# Patient Record
Sex: FEMALE | Race: OTHER | HISPANIC OR LATINO | Employment: FULL TIME | URBAN - METROPOLITAN AREA
[De-identification: names, ages, dates, MRNs, and addresses within clinical notes are randomized per-mention and may not be internally consistent; named-entity substitution may affect disease eponyms.]

---

## 2022-09-01 ENCOUNTER — OCCMED (OUTPATIENT)
Dept: URGENT CARE | Facility: CLINIC | Age: 37
End: 2022-09-01

## 2022-09-01 DIAGNOSIS — Z02.1 PHYSICAL EXAM, PRE-EMPLOYMENT: Primary | ICD-10-CM

## 2022-09-01 LAB — RUBV IGG SERPL IA-ACNC: >175 IU/ML

## 2022-09-01 PROCEDURE — 86787 VARICELLA-ZOSTER ANTIBODY: CPT | Performed by: NURSE PRACTITIONER

## 2022-09-01 PROCEDURE — 86762 RUBELLA ANTIBODY: CPT | Performed by: NURSE PRACTITIONER

## 2022-09-01 PROCEDURE — 86735 MUMPS ANTIBODY: CPT | Performed by: NURSE PRACTITIONER

## 2022-09-01 PROCEDURE — 86480 TB TEST CELL IMMUN MEASURE: CPT | Performed by: NURSE PRACTITIONER

## 2022-09-01 PROCEDURE — 86765 RUBEOLA ANTIBODY: CPT | Performed by: NURSE PRACTITIONER

## 2022-09-02 LAB
MEV IGG SER QL IA: NORMAL
MUV IGG SER QL IA: NORMAL
VZV IGG SER QL IA: NORMAL

## 2022-09-03 LAB
GAMMA INTERFERON BACKGROUND BLD IA-ACNC: 0.03 IU/ML
M TB IFN-G BLD-IMP: NEGATIVE
M TB IFN-G CD4+ BCKGRND COR BLD-ACNC: 0 IU/ML
M TB IFN-G CD4+ BCKGRND COR BLD-ACNC: 0.03 IU/ML
MITOGEN IGNF BCKGRD COR BLD-ACNC: >10 IU/ML

## 2023-03-14 ENCOUNTER — OFFICE VISIT (OUTPATIENT)
Dept: FAMILY MEDICINE CLINIC | Facility: CLINIC | Age: 38
End: 2023-03-14

## 2023-03-14 VITALS
SYSTOLIC BLOOD PRESSURE: 112 MMHG | BODY MASS INDEX: 28.6 KG/M2 | HEART RATE: 78 BPM | OXYGEN SATURATION: 99 % | TEMPERATURE: 98.1 F | HEIGHT: 67 IN | WEIGHT: 182.2 LBS | DIASTOLIC BLOOD PRESSURE: 78 MMHG

## 2023-03-14 DIAGNOSIS — Z11.59 NEED FOR HEPATITIS C SCREENING TEST: ICD-10-CM

## 2023-03-14 DIAGNOSIS — Z00.00 ANNUAL PHYSICAL EXAM: Primary | ICD-10-CM

## 2023-03-14 DIAGNOSIS — L60.8 NAIL DEFORMITY: ICD-10-CM

## 2023-03-14 DIAGNOSIS — R53.83 OTHER FATIGUE: ICD-10-CM

## 2023-03-14 DIAGNOSIS — E03.9 HYPOTHYROIDISM, UNSPECIFIED TYPE: ICD-10-CM

## 2023-03-14 DIAGNOSIS — Z11.4 SCREENING FOR HIV (HUMAN IMMUNODEFICIENCY VIRUS): ICD-10-CM

## 2023-03-14 DIAGNOSIS — Z13.1 SCREENING FOR DIABETES MELLITUS: ICD-10-CM

## 2023-03-14 DIAGNOSIS — Z13.6 SCREENING FOR CARDIOVASCULAR CONDITION: ICD-10-CM

## 2023-03-14 DIAGNOSIS — Z12.4 SCREENING FOR CERVICAL CANCER: ICD-10-CM

## 2023-03-14 RX ORDER — HYDROCORTISONE 25 MG/G
CREAM TOPICAL EVERY 12 HOURS
COMMUNITY
Start: 2015-10-13

## 2023-03-14 RX ORDER — LEVOTHYROXINE SODIUM 0.12 MG/1
125 TABLET ORAL DAILY
COMMUNITY

## 2023-03-14 NOTE — PROGRESS NOTES
[address]    NAME: Isela Molina  AGE: 44 y o  SEX: female  : 1985     DATE: 3/14/2023     Assessment and Plan:     Problem List Items Addressed This Visit        Endocrine    Hypothyroidism    Relevant Medications    levothyroxine 125 mcg tablet    Other Relevant Orders    Lipid panel    Comprehensive metabolic panel    TSH, 3rd generation       Other    Other fatigue    Relevant Orders    CBC and differential   Other Visit Diagnoses     Annual physical exam    -  Primary    Need for hepatitis C screening test        Relevant Orders    Hepatitis C Antibody ([facility])    Screening for HIV (human immunodeficiency virus)        Relevant Orders    HIV 1/2 AG/AB w Reflex [facility] for 2 yr old and above    Screening for diabetes mellitus        Screening for cardiovascular condition        Relevant Orders    Lipid panel    Nail deformity        Relevant Orders    Ambulatory Referral to Dermatology    Screening for cervical cancer        Relevant Orders    Ambulatory Referral to Obstetrics / Gynecology          Immunizations and preventive care screenings were discussed with patient today  Appropriate education was printed on patient's after visit summary  Counseling:  Alcohol/drug use: discussed moderation in alcohol intake, the recommendations for healthy alcohol use, and avoidance of illicit drug use  Dental Health: discussed importance of regular tooth brushing, flossing, and dental visits  Injury prevention: discussed safety/seat belts, safety helmets, smoke detectors, carbon dioxide detectors, and smoking near bedding or upholstery  Sexual health: discussed sexually transmitted diseases, partner selection, use of condoms, avoidance of unintended pregnancy, and contraceptive alternatives    Exercise: the importance of regular exercise/physical activity was discussed   Recommend exercise 3-5 times per week for at least 30 minutes  BMI Counseling: Body mass index is 28 75 kg/m²  The BMI is above normal  Nutrition recommendations include decreasing portion sizes, encouraging healthy choices of fruits and vegetables, decreasing fast food intake, limiting drinks that contain sugar, moderation in carbohydrate intake, reducing intake of saturated and trans fat and reducing intake of cholesterol  Exercise recommendations include moderate physical activity 150 minutes/week and exercising 3-5 times per week  Rationale for BMI follow-up plan is due to patient being overweight or obese  Depression Screening and Follow-up Plan: Patient was screened for depression during today's encounter  They screened negative with a PHQ-2 score of 0  Return in about 1 year (around 3/14/2024), or if symptoms worsen or fail to improve, for Annual physical      Chief Complaint:     Chief Complaint   Patient presents with   • New Patient Visit      History of Present Illness:     Adult Annual Physical   Patient here for a comprehensive physical exam  The patient reports a nail deformity that will not heal  Discussed with patient there is likely nothing that can be done but she would like a 2nd opinion  She is also trying to get pregnant and would like to establish with an OB  Diet and Physical Activity  Diet/Nutrition: well balanced diet and consuming 3-5 servings of fruits/vegetables daily  Exercise: no formal exercise  Depression Screening  PHQ-2/9 Depression Screening    Little interest or pleasure in doing things: 0 - not at all  Feeling down, depressed, or hopeless: 0 - not at all  PHQ-2 Score: 0  PHQ-2 Interpretation: Negative depression screen       General Health  Sleep: sleeps poorly and gets 4-6 hours of sleep on average  Hearing: normal - bilateral   Vision: most recent eye exam <1 year ago and wears glasses  Dental: regular dental visits and brushes teeth twice daily         /GYN Health  Last menstrual period: 3/10/2023, lasts about 7 days, regular  Contraceptive method: none  History of STDs?: no   Admits to heavy bleeding for about 3 days (4-5 large pads in a day) and has some lightheadedness with it  Will evaluate for anemia  Review of Systems:     Review of Systems   Constitutional: Negative for activity change, chills, diaphoresis and fever  HENT: Negative for ear pain, hearing loss, postnasal drip, rhinorrhea, sinus pressure, sinus pain, sneezing and sore throat  Respiratory: Negative for cough, chest tightness, shortness of breath and wheezing  Cardiovascular: Negative for chest pain, palpitations and leg swelling  Gastrointestinal: Negative for abdominal pain, blood in stool, constipation, diarrhea, nausea and vomiting  Genitourinary: Negative for dysuria, frequency, hematuria and urgency  Musculoskeletal: Negative for arthralgias and myalgias  Neurological: Negative for dizziness, syncope, weakness, light-headedness, numbness and headaches  Past Medical History:     Past Medical History:   Diagnosis Date   • Hyperthyroidism       Past Surgical History:     History reviewed  No pertinent surgical history     Social History:     Social History     Socioeconomic History   • Marital status: /Civil Union     Spouse name: None   • Number of children: 1   • Years of education: None   • Highest education level: None   Occupational History   • None   Tobacco Use   • Smoking status: Never     Passive exposure: Never   • Smokeless tobacco: Never   Vaping Use   • Vaping Use: Never used   Substance and Sexual Activity   • Alcohol use: Not Currently     Alcohol/week: 2 0 standard drinks     Types: 2 Glasses of wine per week   • Drug use: Never   • Sexual activity: None   Other Topics Concern   • None   Social History Narrative   • None     Social Determinants of Health     Financial Resource Strain: Not on file   Food Insecurity: Not on file   Transportation Needs: Not on file Physical Activity: Not on file   Stress: Not on file   Social Connections: Not on file   Intimate Partner Violence: Not on file   Housing Stability: Not on file      Family History:     Family History   Problem Relation Age of Onset   • Diabetes Mother       Current Medications:     Current Outpatient Medications   Medication Sig Dispense Refill   • levothyroxine 125 mcg tablet Take 125 mcg by mouth daily     • hydrocortisone (ANUSOL-HC) 2 5 % rectal cream Every 12 hours (Patient not taking: Reported on 3/14/2023)       No current facility-administered medications for this visit  Allergies:     No Known Allergies   Physical Exam:     /78 (BP Location: Left arm, Patient Position: Sitting, Cuff Size: Adult)   Pulse 78   Temp 98 1 °F (36 7 °C) (Temporal)   Ht 5' 6 75" (1 695 m)   Wt 82 6 kg (182 lb 3 2 oz)   SpO2 99%   BMI 28 75 kg/m²     Physical Exam  Vitals reviewed  Constitutional:       General: She is not in acute distress  Appearance: She is well-developed  She is not ill-appearing, toxic-appearing or diaphoretic  HENT:      Head: Normocephalic and atraumatic  Right Ear: Tympanic membrane, ear canal and external ear normal  There is no impacted cerumen  Left Ear: Tympanic membrane, ear canal and external ear normal  There is no impacted cerumen  Nose: Nose normal  No congestion or rhinorrhea  Mouth/Throat:      Mouth: Mucous membranes are moist       Pharynx: Oropharynx is clear  No oropharyngeal exudate  Eyes:      General: No scleral icterus  Right eye: No discharge  Left eye: No discharge  Conjunctiva/sclera: Conjunctivae normal       Pupils: Pupils are equal, round, and reactive to light  Neck:      Vascular: No JVD  Cardiovascular:      Rate and Rhythm: Normal rate and regular rhythm  Heart sounds: Normal heart sounds  No murmur heard  No friction rub  No gallop     Pulmonary:      Effort: Pulmonary effort is normal  No respiratory distress  Breath sounds: Normal breath sounds  No wheezing or rales  Chest:      Chest wall: No tenderness  Abdominal:      General: Bowel sounds are normal  There is no distension  Palpations: Abdomen is soft  Tenderness: There is no abdominal tenderness  There is no guarding or rebound  Musculoskeletal:         General: No tenderness  Normal range of motion  Lymphadenopathy:      Cervical: No cervical adenopathy  Skin:     General: Skin is warm and dry  Coloration: Skin is not pale  Findings: No erythema  Neurological:      General: No focal deficit present  Mental Status: She is alert and oriented to person, place, and time  Mental status is at baseline  Cranial Nerves: No cranial nerve deficit  Psychiatric:         Mood and Affect: Mood normal          Behavior: Behavior normal          Thought Content:  Thought content normal          Judgment: Judgment normal           Lee Kwon, DO   [address]

## 2023-03-30 ENCOUNTER — APPOINTMENT (OUTPATIENT)
Dept: LAB | Facility: HOSPITAL | Age: 38
End: 2023-03-30

## 2023-03-30 DIAGNOSIS — Z11.4 SCREENING FOR HIV (HUMAN IMMUNODEFICIENCY VIRUS): ICD-10-CM

## 2023-03-30 DIAGNOSIS — E03.9 HYPOTHYROIDISM, UNSPECIFIED TYPE: ICD-10-CM

## 2023-03-30 DIAGNOSIS — Z13.6 SCREENING FOR CARDIOVASCULAR CONDITION: ICD-10-CM

## 2023-03-30 DIAGNOSIS — Z11.59 NEED FOR HEPATITIS C SCREENING TEST: ICD-10-CM

## 2023-03-30 DIAGNOSIS — R53.83 OTHER FATIGUE: ICD-10-CM

## 2023-03-30 LAB
ALBUMIN SERPL BCP-MCNC: 4.4 G/DL (ref 3.5–5)
ALP SERPL-CCNC: 63 U/L (ref 34–104)
ALT SERPL W P-5'-P-CCNC: 15 U/L (ref 7–52)
ANION GAP SERPL CALCULATED.3IONS-SCNC: 5 MMOL/L (ref 4–13)
AST SERPL W P-5'-P-CCNC: 20 U/L (ref 13–39)
BASOPHILS # BLD AUTO: 0.04 THOUSANDS/ÂΜL (ref 0–0.1)
BASOPHILS NFR BLD AUTO: 1 % (ref 0–1)
BILIRUB SERPL-MCNC: 0.51 MG/DL (ref 0.2–1)
BUN SERPL-MCNC: 13 MG/DL (ref 5–25)
CALCIUM SERPL-MCNC: 9.4 MG/DL (ref 8.4–10.2)
CHLORIDE SERPL-SCNC: 109 MMOL/L (ref 96–108)
CHOLEST SERPL-MCNC: 171 MG/DL
CO2 SERPL-SCNC: 24 MMOL/L (ref 21–32)
CREAT SERPL-MCNC: 0.78 MG/DL (ref 0.6–1.3)
EOSINOPHIL # BLD AUTO: 0.07 THOUSAND/ÂΜL (ref 0–0.61)
EOSINOPHIL NFR BLD AUTO: 2 % (ref 0–6)
ERYTHROCYTE [DISTWIDTH] IN BLOOD BY AUTOMATED COUNT: 17.2 % (ref 11.6–15.1)
GFR SERPL CREATININE-BSD FRML MDRD: 96 ML/MIN/1.73SQ M
GLUCOSE P FAST SERPL-MCNC: 83 MG/DL (ref 65–99)
HCT VFR BLD AUTO: 35.6 % (ref 34.8–46.1)
HCV AB SER QL: NORMAL
HDLC SERPL-MCNC: 66 MG/DL
HGB BLD-MCNC: 11.1 G/DL (ref 11.5–15.4)
HIV 1+2 AB+HIV1 P24 AG SERPL QL IA: NORMAL
HIV 2 AB SERPL QL IA: NORMAL
HIV1 AB SERPL QL IA: NORMAL
HIV1 P24 AG SERPL QL IA: NORMAL
IMM GRANULOCYTES # BLD AUTO: 0.05 THOUSAND/UL (ref 0–0.2)
IMM GRANULOCYTES NFR BLD AUTO: 1 % (ref 0–2)
LDLC SERPL CALC-MCNC: 93 MG/DL (ref 0–100)
LYMPHOCYTES # BLD AUTO: 1.32 THOUSANDS/ÂΜL (ref 0.6–4.47)
LYMPHOCYTES NFR BLD AUTO: 28 % (ref 14–44)
MCH RBC QN AUTO: 25.5 PG (ref 26.8–34.3)
MCHC RBC AUTO-ENTMCNC: 31.2 G/DL (ref 31.4–37.4)
MCV RBC AUTO: 82 FL (ref 82–98)
MONOCYTES # BLD AUTO: 0.47 THOUSAND/ÂΜL (ref 0.17–1.22)
MONOCYTES NFR BLD AUTO: 10 % (ref 4–12)
NEUTROPHILS # BLD AUTO: 2.71 THOUSANDS/ÂΜL (ref 1.85–7.62)
NEUTS SEG NFR BLD AUTO: 58 % (ref 43–75)
NONHDLC SERPL-MCNC: 105 MG/DL
NRBC BLD AUTO-RTO: 0 /100 WBCS
PLATELET # BLD AUTO: 349 THOUSANDS/UL (ref 149–390)
PMV BLD AUTO: 10.7 FL (ref 8.9–12.7)
POTASSIUM SERPL-SCNC: 4.4 MMOL/L (ref 3.5–5.3)
PROT SERPL-MCNC: 7.4 G/DL (ref 6.4–8.4)
RBC # BLD AUTO: 4.36 MILLION/UL (ref 3.81–5.12)
SODIUM SERPL-SCNC: 138 MMOL/L (ref 135–147)
TRIGL SERPL-MCNC: 58 MG/DL
TSH SERPL DL<=0.05 MIU/L-ACNC: 0.19 UIU/ML (ref 0.45–4.5)
WBC # BLD AUTO: 4.66 THOUSAND/UL (ref 4.31–10.16)

## 2023-04-03 DIAGNOSIS — E03.9 HYPOTHYROIDISM, UNSPECIFIED TYPE: Primary | ICD-10-CM

## 2023-04-10 PROBLEM — Z33.1 INCIDENTAL PREGNANCY: Status: ACTIVE | Noted: 2023-04-10

## 2023-04-20 ENCOUNTER — APPOINTMENT (OUTPATIENT)
Dept: LAB | Facility: HOSPITAL | Age: 38
End: 2023-04-20

## 2023-04-20 DIAGNOSIS — Z33.1 INCIDENTAL PREGNANCY: ICD-10-CM

## 2023-04-20 DIAGNOSIS — N91.2 AMENORRHEA: ICD-10-CM

## 2023-04-20 DIAGNOSIS — E03.9 HYPOTHYROIDISM, UNSPECIFIED TYPE: ICD-10-CM

## 2023-04-20 LAB
B-HCG SERPL-ACNC: ABNORMAL MIU/ML (ref 0–11.6)
TSH SERPL DL<=0.05 MIU/L-ACNC: 1.11 UIU/ML (ref 0.45–4.5)

## 2023-05-01 ENCOUNTER — OFFICE VISIT (OUTPATIENT)
Dept: OBGYN CLINIC | Facility: CLINIC | Age: 38
End: 2023-05-01

## 2023-05-01 VITALS
HEART RATE: 82 BPM | WEIGHT: 187 LBS | DIASTOLIC BLOOD PRESSURE: 78 MMHG | HEIGHT: 66 IN | BODY MASS INDEX: 30.05 KG/M2 | SYSTOLIC BLOOD PRESSURE: 110 MMHG

## 2023-05-01 DIAGNOSIS — F41.9 ANXIETY: Primary | ICD-10-CM

## 2023-05-01 DIAGNOSIS — Z3A.01 LESS THAN 8 WEEKS GESTATION OF PREGNANCY: ICD-10-CM

## 2023-05-01 RX ORDER — LEVOTHYROXINE SODIUM 137 UG/1
TABLET ORAL
COMMUNITY
Start: 2023-04-25

## 2023-05-01 NOTE — PROGRESS NOTES
"[facility]   1985    S:  44 y o  female who is currently 6w6d pregnant by LMP, presenting for problem visit with c/o fatigue, nausea, vomiting, and anxiety  Fatigue, nausea, and  Vomiting started about 3 weeks ago, shortly before she found out she was pregnant  Does have a hx of GERD, which has worsened over this time  Nausea coming in waves every 2-3 hours  Over past week has been vomiting 1-3 times per day  No trouble with keeping solids or liquids down separate from these episodes  Has not tried treating these episodes  She reports heightened anxiety over this time due to stress at work  She reports episode of heightened anxiety / panic attacks during which her chest feels tightes, heart feels like it is racing, and she has sensation she is going to die  These sx alleviate over the course of minutes with deep breathing  Episodes occur in relation to being at or thinking about work  She has never seen a therapist or been on medications for anxiety  Does not desire medications at this time  Her  accompanies her today  Denies pelvic pain/cramping, vaginal bleeding, vaginal discharge (other than clear to white discharge which is her normal), itching, or odor  Review of Systems   Constitutional: neg F/C/S  Respiratory: Negative  Cardiovascular: As above  Gastrointestinal: + nausea, vomiting  Neg abd pain, change in bowel habits  Genitourinary: Negative for difficulty urinating, pelvic pain, vaginal bleeding, vaginal discharge, itching or odor  O:  /78 (BP Location: Left arm, Patient Position: Sitting, Cuff Size: Standard)   Pulse 82   Ht 5' 6 25\" (1 683 m)   Wt 84 8 kg (187 lb)   LMP 03/14/2023   BMI 29 96 kg/m²   She appears well and is in no distress  Normocephalic, atraumatic    RRR  Normal respiratory effort  No focal neurological deficits  Normal mood, affect, and behavior       A/P:  Diagnoses and all orders for this visit:    Less than 8 weeks " gestation of pregnancy    Scheduled for dating US next week  Reviewed common s/sx of early pregnancy  Supportive therapies and medications safe in pregnancy reviewed  No signs of dehydration on physical at this time and is tolerating PO intake  List of medications safe in pregnancy given  Reviewed s/sx to report  Anxiety    Discussed options for tx  She declines medication tx today  Reviewed indication and safety profile  Can utilize benadryl if needed for anxiety episodes or help with falling asleep  Recommended to establish with counselor  [facility] # provided  Reviewed coping skills  She is to call with any worsen or persistent sx  To ER with any worsening, change, SI/HI

## 2023-05-05 ENCOUNTER — OFFICE VISIT (OUTPATIENT)
Dept: FAMILY MEDICINE CLINIC | Facility: CLINIC | Age: 38
End: 2023-05-05

## 2023-05-05 VITALS
HEART RATE: 75 BPM | HEIGHT: 66 IN | OXYGEN SATURATION: 99 % | BODY MASS INDEX: 30.41 KG/M2 | TEMPERATURE: 98.3 F | WEIGHT: 189.2 LBS | DIASTOLIC BLOOD PRESSURE: 80 MMHG | SYSTOLIC BLOOD PRESSURE: 110 MMHG

## 2023-05-05 DIAGNOSIS — F41.9 ANXIETY: ICD-10-CM

## 2023-05-05 DIAGNOSIS — E03.9 HYPOTHYROIDISM, UNSPECIFIED TYPE: Primary | ICD-10-CM

## 2023-05-05 NOTE — ASSESSMENT & PLAN NOTE
Worsening  · OH-7: 6  · Patient is anxious about her job and feels that she is being overworked  · She is also worried about exposure to chemicals for her baby  · Discussed with patient that she should reach out to her human resources to find out if her job is safe to work during pregnancy  · I also recommend patient look for alternative positions at her job that are less physically demanding  There is no contraindication to pregnancy regarding heavy lifting at this stage     · Continue to monitor

## 2023-05-05 NOTE — PROGRESS NOTES
Assessment/Plan:      1  Hypothyroidism, unspecified type    2  Anxiety  Assessment & Plan:  Worsening  OH-7: 6  Patient is anxious about her job and feels that she is being overworked  She is also worried about exposure to chemicals for her baby  Discussed with patient that she should reach out to her human resources to find out if her job is safe to work during pregnancy  I also recommend patient look for alternative positions at her job that are less physically demanding  There is no contraindication to pregnancy regarding heavy lifting at this stage  Continue to monitor              Subjective:      Patient ID: Leamon Severs is a 44 y o  female presents today to talk about stress  She works in a sterilizing department  She spoke with superviser who told her she does not have to work with the machine  Per patient she is working with hydroperoxide  It is unclear what other chemicals are involved  No evidence of exposure to radiation  Overall she is stressed out about her job and is looking to switch positions  HPI    The following portions of the patient's history were reviewed and updated as appropriate: allergies, current medications, past family history, past medical history, past social history, past surgical history and problem list     Review of Systems   Constitutional: Negative for activity change, chills, diaphoresis and fever  HENT: Negative for ear pain, hearing loss, postnasal drip, rhinorrhea, sinus pressure, sinus pain, sneezing and sore throat  Respiratory: Negative for cough, chest tightness, shortness of breath and wheezing  Cardiovascular: Negative for chest pain, palpitations and leg swelling  Gastrointestinal: Negative for abdominal pain, blood in stool, constipation, diarrhea, nausea and vomiting  Genitourinary: Negative for dysuria, frequency, hematuria and urgency  Musculoskeletal: Negative for arthralgias and myalgias     Neurological: Negative for "dizziness, syncope, weakness, light-headedness, numbness and headaches  Psychiatric/Behavioral: The patient is nervous/anxious  Objective:      /80 (BP Location: Left arm, Patient Position: Sitting, Cuff Size: Adult)   Pulse 75   Temp 98 3 °F (36 8 °C) (Temporal)   Ht 5' 6\" (1 676 m)   Wt 85 8 kg (189 lb 3 2 oz)   LMP 03/14/2023   SpO2 99%   BMI 30 54 kg/m²          Physical Exam  Vitals reviewed  Constitutional:       General: She is not in acute distress  Appearance: She is well-developed  She is not diaphoretic  HENT:      Head: Normocephalic and atraumatic  Right Ear: External ear normal       Left Ear: External ear normal       Nose: Nose normal  No congestion  Mouth/Throat:      Mouth: Mucous membranes are moist       Pharynx: Oropharynx is clear  No oropharyngeal exudate  Eyes:      General: No scleral icterus  Right eye: No discharge  Left eye: No discharge  Conjunctiva/sclera: Conjunctivae normal       Pupils: Pupils are equal, round, and reactive to light  Neck:      Thyroid: No thyromegaly  Vascular: No JVD  Trachea: No tracheal deviation  Cardiovascular:      Rate and Rhythm: Normal rate and regular rhythm  Heart sounds: Normal heart sounds  No murmur heard  No friction rub  No gallop  Pulmonary:      Effort: Pulmonary effort is normal  No respiratory distress  Breath sounds: Normal breath sounds  No wheezing or rales  Chest:      Chest wall: No tenderness  Abdominal:      General: Bowel sounds are normal  There is no distension  Palpations: Abdomen is soft  Tenderness: There is no abdominal tenderness  There is no right CVA tenderness, left CVA tenderness, guarding or rebound  Musculoskeletal:         General: Normal range of motion  Cervical back: Normal range of motion and neck supple  No tenderness  Right lower leg: No edema  Left lower leg: No edema     Lymphadenopathy:      " Cervical: No cervical adenopathy  Skin:     General: Skin is warm and dry  Neurological:      Mental Status: She is alert and oriented to person, place, and time  Mental status is at baseline  Psychiatric:         Mood and Affect: Mood normal          Behavior: Behavior normal          Thought Content:  Thought content normal          Judgment: Judgment normal

## 2023-05-08 ENCOUNTER — TELEPHONE (OUTPATIENT)
Dept: FAMILY MEDICINE CLINIC | Facility: CLINIC | Age: 38
End: 2023-05-08

## 2023-05-08 NOTE — TELEPHONE ENCOUNTER
Patient is calling to find out what should be her dosage for levothyroxine   She has some confusion on whether it should be 125 or 137 mg

## 2023-05-10 DIAGNOSIS — E03.9 HYPOTHYROIDISM, UNSPECIFIED TYPE: Primary | ICD-10-CM

## 2023-05-10 RX ORDER — LEVOTHYROXINE SODIUM 0.12 MG/1
125 TABLET ORAL DAILY
Qty: 90 TABLET | Refills: 0 | Status: SHIPPED | OUTPATIENT
Start: 2023-05-10

## 2023-05-18 ENCOUNTER — TELEPHONE (OUTPATIENT)
Dept: OBGYN CLINIC | Facility: CLINIC | Age: 38
End: 2023-05-18

## 2023-05-18 NOTE — TELEPHONE ENCOUNTER
----- Message from [employer] [facility] EMERALD Rolon sent at 5/17/2023  8:47 PM EDT -----  Regarding: Job documents   Contact: [phone]  Good afternoon Ms Marvie Pallas, I have attached you documents from Human Resources if you please fill them and send it back before May 19th  I will appreciate it thank you

## 2023-05-22 ENCOUNTER — ULTRASOUND (OUTPATIENT)
Dept: OBGYN CLINIC | Facility: CLINIC | Age: 38
End: 2023-05-22

## 2023-05-22 VITALS
HEIGHT: 66 IN | BODY MASS INDEX: 29.73 KG/M2 | SYSTOLIC BLOOD PRESSURE: 120 MMHG | DIASTOLIC BLOOD PRESSURE: 74 MMHG | WEIGHT: 185 LBS

## 2023-05-22 DIAGNOSIS — N91.2 AMENORRHEA: Primary | ICD-10-CM

## 2023-05-22 DIAGNOSIS — Z36.89 ESTABLISH GESTATIONAL AGE, ULTRASOUND: ICD-10-CM

## 2023-05-22 DIAGNOSIS — N89.8 VAGINAL DISCHARGE: ICD-10-CM

## 2023-05-22 DIAGNOSIS — Z3A.11 11 WEEKS GESTATION OF PREGNANCY: ICD-10-CM

## 2023-05-22 PROBLEM — Z3A.09 9 WEEKS GESTATION OF PREGNANCY: Status: ACTIVE | Noted: 2023-05-22

## 2023-05-22 NOTE — ASSESSMENT & PLAN NOTE
She is a [id number] with Patient's last menstrual period was 03/14/2023  US today is showing a viable IUP S>D- has short cycles  New EDC given 12/11/23  F/U for ob intake, followed by initial prenatal exam in  2 wks

## 2023-05-22 NOTE — LETTER
May 22, 2023    [address] [facility] [id number]    Work Letter    Mariia Lutz  1985  [place] [id number]    Dear Mariia Lutz,      05/22/23        Your employee is a patient at [facility]  We recommend that all pregnant women:    1  Have a well-ventilated workspace  2  Wear low-heeled shoes  3  Work no more than 40 hours per week  4  Have a 15 minute break every 2 hours and at least 30 minutes for a meal break  5  Use good body mechanics by bending at your knees to avoid back strain and lift no more than 20 pounds without assistance  Will need assistance with lifting over 25 lbs  6  Have ready access to bathrooms and water  She may continue to work until her due date unless medical complications arise  We anticipate she may return to work in 6-8 weeks after delivery       Sincerely,          JENNA Napier        CC: No Recipients

## 2023-05-22 NOTE — PATIENT INSTRUCTIONS
"Again, congratulations on your pregnancy! NEXT STEPS  Get Prenatal bloodwork  Call MFM to schedule next ultrasound (done 12-13 wks)   Contact information for [facility] ([facility])- Main Number [phone]   Return to our office in 1-2 weeks for an OB intake and initial prenatal Exam(or sooner if any problems/concerns arise- see packet for things to report)  Check out [employer] website to read the [employer]" -this has lots of great early pregnancy information     It can be found by going to [email or web address]  org-->select services-->select women's health-->select Obstetrics  [email or web address]    Below is a variety of information that is useful in early pregnancy   Genetic screening can be very confusing for most people   We do recommend genetic screening universally for all pregnant women  Our goal is to have the most healthy uncomplicated pregnancy possible and this is one way to identify possible complications early  Common disorders we can screen for include: Down Syndrome, Neural tube defects ( like spina bifida or gastroschisis) and trisomy 15, even possibly more  There are several options we will talk more about  Below is some information to get you started thinking about this  We will discuss this more at the next appt  GUIDE TO GENETIC TESTING    Aneuploidy Testing  Trisomy 21 (Down Syndrome), Trisomy 18, and Open Neural Tube Defects (Spina Bifida)  You may choose one of the following options: See below for CPT/Diagnostic codes  NIPT (Non-Invasive Prenatal Testing or Cell Free- Fetal DNA): This simple and accurate non-invasive prenatal screening blood test offers results for early risk assessment of Down syndrome (Trisomy 21), or Trisomy 25, trisomy 15 and other aneuploidy conditions  The test also offers an optional analysis for fetal sex    The test analyzes the relative amount of 21, 18, 13; X and Y chromosome " material in circulating cell-free DNA from a maternal blood sample  This test can be performed at any time after 10 weeks gestation  If you elect this test, you will also have an AFP (alpha-fetoprotein) blood test to test for open neural tube defects  Recommended follow up to a positive result is genetic counseling and prenatal diagnosis  Sequential Screening with Nuchal Translucency: This is a two-step test to detect whether a fetus is at increased risk for trisomy 24, trisomy 25, trisomy 15 and open neural tube defects  The test has a narrow window for testing (the first step must be performed between 10 and 13 weeks gestation)  It includes two blood draws and an ultrasound  The ultrasound measures the amount of fluid behind the baby’s neck called the nuchal translucency (NT)  The blood tests measures three different maternal hormone levels, -- pregnancy associated plasma protein (VALDEMAR-A), human chorionic gonadotrophin (hCG), and dimeric inhibin A (JENNIFER)  These measurements in combination with some maternal information such as height and weight are used to calculate whether the baby is at increased risk for Down Syndrome or Trisomy 18  An alpha-fetoprotein test (AFP) is also included to test for open neural tube defects  Recommended follow up to a positive result is additional testing that is more definitive, and referral for genetic counseling and prenatal diagnosis  Quad Screen: This test is also known as the quadruple marker test   It is a test that measures levels of four substances in a pregnant woman’s blood--Alpha-fetoprotein (AFP), a protein made by the developing baby; Human chorionic gonadotropin (hCG), a hormone made by the placenta; Estriol, a hormone made by the placenta and the baby’s liver; and Inhibin A, another hormone made by the placenta    Typically, the quad screen is done between weeks 15 and 20 of pregnancy--the second trimester and the results indicate whether the baby is at higher risk for Down Syndrome (Trisomy 21), Trisomy 18, and open neural tube defects  This is a screening test   Recommended follow up to a positive result is additional testing that is more definitive, as well as referral for genetic counseling and prenatal diagnosis  Trisomy 21 Trisomy 18 Trisomy 13   NIPT  (FPR 0 1%) <99% <98% 99%   Sequential Screening (FPR 3 5%) 92% 90% N/A   Quad Screen   (FPR 5%) 83% 80% N/A   (FPR is False Positive Rate)       Additional Screening Tests Offered  Cystic Fibrosis: Cystic Fibrosis is the most common inherited disease of children and young adults  The carrier frequency is 1 in 24, to 1 in 97  Both parents need to be carriers for a child to be affected (25% chance)  One in 200, children born are affected  Cystic fibrosis is a disorder of mucus production and produces abnormally thick mucus leading to life threatening lung infections, digestion problems, poor growth, infertility, and more  Symptoms range from mild to severe, but individuals with severe disease may die in childhood  With treatments today, people with Cystic Fibrosis can live into their 30’s  CF does not affect intelligence  Recommended follow up to a positive result is testing of the baby’s father  Spinal Muscular Atrophy (SMA): SMA is the most common inherited cause of early childhood death  The carrier frequency is 1 in 52 to 1 in 67 in the [place] and both parents need to be carriers for a child to be affected (25% chance)  1 in 11,000 children are affected  SMA is a progressive degeneration of lower motor neurons  Muscle weakness is the most common type with respiratory failure by the age of 3years old  Muscles responsible for crawling, walking, swallowing, and head and neck control are most severely affected  Recommended follow up to a positive result is testing of the baby’s father        Fragile X Syndrome (the most common inherited cause of developmental delays): Fragile X syndrome is an “X-linked” genetic disease, which means it is only carried by the mom  Unfortunately, 1 in 250 females are carriers and a child has a 50% chance of being affected if this is the case  1 in 4000 boys is affected with Fragile X and 1 in 8000 girls is affected  Approximately 1/3 of all children born with Fragile X also have autism and hyperactivity  Recommended follow up to a positive result is genetic counseling and prenatal diagnosis  Guide To Insurance Coverage For Genetic Screening  Due to the complexity of coverage guidelines, we are unable to quote benefits or guarantee insurance coverage for any of these tests  Insurance benefits are plan-specific and offer vastly different coverage based on your policy  The handout attached explains the benefits to each test, and also provides the billing (CPT) codes for each test   Even if the testing is covered, it could be applied to any unmet deductibles, and copays may apply, resulting in a bill  You are encouraged to contact your insurance company to obtain your benefits based on your age and risk factors, so that there are no surprises  Test Insurance Procedure (CPT) code   NIPT-cell free fetal DNA Most accurate non-invasive test- not always covered w/o risk factors 32567   Sequential Screen w/ NT US Current standard test-should be covered Part 1: (if lab is [facility]) 04299,69231   (If lab is [address] [place] [address]) [address]  Part 2: (if lab is [facility])87630,71985,92274, 29982  (If lab is [facility]) 63207   Quad screen Old standard-use if past 1st trimester 68217, [address] [facility], [address], [address]   CF- Cystic Fibrosis  71981   SMA- Spinal Musc   Atrophy  43762   Fragile X  [id number]       Diagnosis code used Varies based on history- But will be one of these:  Encounter for  screening for other genetic defect Z36 8  Advanced Maternal Age (over 28) O12 46  Family History of Genetic Disease Carrier Z84 81    Please contact your insurance company with the appropriate CPT code from the attached list, and diagnosis code applicable to your situation  We ask that you review this information and decide what testing you would like to have performed  Please note that the Sequential Screening with Nuchal Translucency has a smaller window of time to be performed during pregnancy (Prior to 14 wks)  Warning Signs During Pregnancy  The list below includes warning signs your providers would like you to be aware of  If you experience any of these at any time during your pregnancy, please call us as soon as possible  Vaginal bleeding   Sharp abdominal pain that does not go away   Fever (more than 100  4? F and is not relieved with Tylenol)   Persistent vomiting lasting greater than 24 hours   Chest pain   Pain or burning when you urinate   Severe headache that doesn’t resolve with Tylenol   Blurred vision or seeing spots in your vision   Sudden swelling of your face or hands   Redness, swelling or pain in a leg   A sudden weight gain in just a few days   Decrease in your baby’s movements (after 28 weeks or the 6th month of pregnancy)   A loss of watery fluid from your vagina - can be a gush, a trickle or  continuous wetness   After 20 weeks of pregnancy, rhythmic cramping (greater than 4 per hour)  or menstrual like low/pelvic pain    Call the OFFICE [phone] for any questions/emergencies  At night or on the weekend, please indicate it is an emergency and the DOCTOR on call will be paged      Discomforts of Early Pregnancy    Tips for coping with nausea and vomiting during pregnancy   Eat meals and snacks slowly   Eat every 1-2 hours to avoid a full stomach   Don’t skip meals, avoid empty stomach   Eat a snack prior to getting out of bed   Avoid food and beverages with a strong aroma   Avoid dehydration - drink enough fluid to keep the urine pale yellow   Drink fluids before a meal to minimize the effect of a full stomach   Limit the amount of coffee and beverages that contain caffeine Eliminate spicy, odorous, high fat (fried foods), acidic (tomato products) and sweet foods   Fluids that contain lemon (lemonade), mint (tea) or orange can usually be well tolerated   Snacks and meals that contain low-fat protein (lean meats, fish, poultry and eggs) along with eating easily digestible carbs (fruit, rice, toast, crackers and dry cereal) may be tolerated better   Foods with ginger may be well tolerated  May use ginger root powder, capsules or extract (up to 1000 mg per day)   Drink liquids in small amounts    If symptoms persist, please contact your provider  Tips for coping with constipation during pregnancy   Increase fiber and fluids   - Drink 8-10 cups of liquid, like water or low-sugar juice daily  - Keep urine pale with fluids (water, milk), fruit and vegetables   Eat a well-balanced diet that contains high fiber food (fruits, vegetables, whole grain breads and cereals, bran and dried beans)   Take a 30-minute walk daily   You may take a mild stool softener such as Colace®    If symptoms persist, please contact your provider  For any emergencies, PLEASE CALL THE OFFICE at [phone]  If the office is closed, the doctor on call will be paged by the answering service  Medications and Pregnancy- see Pregnancy Essentials guide online- page 9    Expected Weight Gain During Pregnancy  If you have a healthy BMI (18-25) prior to pregnancy:  The recommended weight gain is between 25-35 pounds  Approximate weight gain  in the first trimester is 1-4 5 pounds  An expected weight gain during the second and  third trimester is approximately one pound per week  If you have a BMI of less than 18 prior to pregnancy,  you are considered underweight:  The recommended weight gain is between 28-40 pounds  Approximate weight gain  in the first trimester is 1-4 5 pounds  An expected weight gain during the second and  third trimester is just over one pound per week    If your BMI is 25 to 29 9: you are considered overweight:  You should gain 1/2 to 2/3 pound during the second and third trimester, for a total  weight gain of 15 to 25 pounds  If you have a BMI of greater than 30 prior to pregnancy,  you are considered overweight:  The recommended weight gain is between 15-25 pounds  Approximate weight gain  in the first trimester is 1-4 5 pounds  An expected weight gain during the second  and third trimester is approximately 0 5 pound per week  Foods to avoid during pregnancy:   Unpasteurized milk, juice and cheese  - Soft cheeses like feta or brie (if made with UNPASTEURIZED milk)   Unheated deli meats like lunchmeat and hotdogs   Undercooked poultry, beef, pork, seafood including raw sushi    What fish is safe to eat during pregnancy? Eat 8 to 12 ounces of fish a week  Pick from this group frequently, especially if you follow  the American Heart Association’s recommendation to eat fish at least 2 times a week  AVOID HIGH MERCURY FISH  A single meal of the following fish can put  you over the Environmental Protection  Agency’s safe limit for the month  High mercury fish:  Shark  Swordfish  [employer]  Tile Fish    Caffeine and Pregnancy    The [employer]  and [employer] of Obstetrics and Gynecologists (ACOG) urge pregnant  women to limit their caffeine consumption to no more than 200 milligrams (mg) per day  This is  comparable to having one 12-ounce cup of coffee a day  This level has been shown not to increase risk  of miscarriage, growth or  labor complications  Effects of higher levels are not known  Exercise During Pregnancy  A daily exercise program that consists of 30 minutes a day is recommended  Low impact exercises like walking and swimming are great exercises throughout  all of pregnancy   If you’re an avid strength  avoid lifting very heavy weights - nothing more  than 30 pounds    Drink plenty of fluids while exercising to stay hydrated    Be careful to avoid overheating  ACTIVITIES TO AVOID   Exercises that can make you lose your balance  Activities that can put your baby at risk i e  horseback riding, scuba diving, skiing  or snowboarding  Any other sport that puts you at risk for getting hit in the  abdominal area  Do not use saunas, steam rooms or hot tubs (that have a higher temperature  than 100F)   After the first trimester, avoid exercises that require you to lay flat on your back  Avoid exceeding a heart rate greater than 140 beats per minute   As long as you are  able to hold a conversation while exercising your heart rate is likely acceptable

## 2023-05-22 NOTE — PROGRESS NOTES
"   Subjective  Patient ID: Jefry Witt is a 44 y o  female here for Pregnancy Ultrasound (Patient here for early ultrasound /Lp 3/14/Nidhi  9w6d/Patient denies vaginal bleeding or cramping  She c/o nausea and some vomiting that has gotten better /Please c/o her vagina always being wet  )    Newly Pregnant  Patient's last menstrual period was 2023  giving her an NIDHI of 23 and a gestational age of  10 weeks 6days (based on LMP)    Menstrual cycle: regular, cycle length:  28 days  Pregnancy was planned  She has started taking a prenatal vitamin    She is C/O amenorrhea  Signs and symptoms of pregnancy:   • Breast tenderness: yes  • Fatigue: yes  • Cramping or Pelvic Pain: no  • Spotting or Vaginal Bleeding: no  • Nausea or vomiting: improved    OB History    Para Term  AB Living   3 1   1 1 1   SAB IAB Ectopic Multiple Live Births           1      # Outcome Date GA Lbr [place]/2nd Weight Sex Delivery Anes PTL Lv   3 Current            2  17 33w0d       TEMITOPE      Complications: Placenta Previa   1 AB 2016    F            The following portions of the patient's history were reviewed and updated as appropriate: allergies, current medications, past family history, past medical history, past social history, past surgical history, and problem list   Perinent hx that may affect pregnancy: Hypothyroid  Hx of placenta previa    The following portions of the patient's history were reviewed and updated as appropriate: allergies, current medications, past family history, past medical history, past social history, past surgical history, and problem list     Review of Systems    See HPI for pertinent positives               /74 (BP Location: Left arm, Patient Position: Sitting)   Ht 5' 6\" (1 676 m)   Wt 83 9 kg (185 lb)   LMP 2023   BMI 29 86 kg/m²   OBGyn Exam  Results for orders placed or performed in visit on 23   TSH, 3rd generation   Result Value Ref Range " TSH 3RD GENERATON 1 109 0 450 - 4 500 uIU/mL   hCG, quantitative   Result Value Ref Range    HCG, Quant 43,076 (H) 0 - 11 6 mIU/mL       FIRST TRIMESTER OBSTETRIC ULTRASOUND  [id number]   Patient's last menstrual period was 03/14/2023  INDICATION: Establish Gestational Age       FINDINGS:  See imaging report for details        Additional Findings: none     FHR: 169  IMPRESSION:    Single intrauterine pregnancy of 11 weeks 0days gestational age  Fetal cardiac activity detected  No adnexal masses seen  EDC by LMP: 12/19/23  EDC by this Ultrasound: 12/11/23    Assigning a Final NIDHI  Please choose how you are assigning the NIDHI: The gestational age by LMP is 9w 0d - 13w 6d and demonstrates more than 7 days difference from the gestational age by [facility], therefore the final NIDHI will be based on the ultrasound at this encounter    Final NIDHI: 12/11/23 by ultrasound at this encounter  Lupe Glynn, [address] [address]  Dept: [phone]  Dept Fax: [phone]    Ultrasound Probe Disinfection    A transvaginal ultrasound was performed  Prior to use, disinfection was performed with High Level Disinfection Process ([employer])  Probe serial number F: [id number] was used  Assessment/Plan:             Problem List Items Addressed This Visit        Pregnancy    11 weeks gestation of pregnancy     She is a [id number] with Patient's last menstrual period was 03/14/2023  US today is showing a viable IUP S>D- has short cycles  New EDC given 12/11/23  F/U for ob intake, followed by initial prenatal exam in  2 wks             Other Visit Diagnoses     Amenorrhea    -  Primary    Relevant Orders    AMB US OB < 14 weeks single or first gestation level 1 (Completed)    Establish gestational age, ultrasound        Relevant Orders    AMB US OB < 14 weeks single or first gestation level 1 (Completed) Orders Placed This Encounter   Procedures   • AMB US OB < 14 weeks single or first gestation level 1

## 2023-05-24 ENCOUNTER — TELEPHONE (OUTPATIENT)
Dept: OBGYN CLINIC | Facility: CLINIC | Age: 38
End: 2023-05-24

## 2023-05-24 LAB
C GLABRATA DNA VAG QL NAA+PROBE: NEGATIVE
C KRUSEI DNA VAG QL NAA+PROBE: NEGATIVE
CANDIDA SP 6 PNL VAG NAA+PROBE: NEGATIVE
T VAGINALIS DNA VAG QL NAA+PROBE: NEGATIVE
VAGINOSIS/ITIS DNA PNL VAG PROBE+SIG AMP: NEGATIVE

## 2023-05-24 NOTE — TELEPHONE ENCOUNTER
Pt is following up about her msg from 5/17  She needs it done by this Friday 5/26 if we could peggy it stat

## 2023-05-26 ENCOUNTER — TELEPHONE (OUTPATIENT)
Dept: OBGYN CLINIC | Facility: CLINIC | Age: 38
End: 2023-05-26

## 2023-06-05 ENCOUNTER — INITIAL PRENATAL (OUTPATIENT)
Dept: OBGYN CLINIC | Facility: CLINIC | Age: 38
End: 2023-06-05

## 2023-06-05 DIAGNOSIS — Z34.81 PRENATAL CARE, SUBSEQUENT PREGNANCY, FIRST TRIMESTER: Primary | ICD-10-CM

## 2023-06-05 PROCEDURE — OBC

## 2023-06-05 NOTE — PROGRESS NOTES
OB INTAKE INTERVIEW  Patient is 44 y o y o  who presents for OB intake at 13wks  She is accompanied by herself during this encounter  The father of her baby (Jack Gomez) is involved in the pregnancy and is 37years old    Last Menstrual Period: 3/14/2023  Ultrasound: Measured 11 weeks 0 days on   Estimated Date of Delivery: 2023 changed by 11 week US    Signs/Symptoms of Pregnancy  Current pregnancy symptoms: nausea, dizziness  Spoke to patient about adequate food intake- crackers at bedside   oz of water staying cool when its hot outside  Educated on vitamin b6 and unisom  Constipation no   Headaches no  Cramping/spotting no  PICA cravings no    Diabetes-  BMI 29 86  If patient has 1 or more, please order early 1 hour GTT  History of GDM no  BMI >35 no  History of PCOS or current metformin use no  History of LGA/macrosomic infant (4000g/9lbs) no    If patient has 2 or more, please order early 1 hour GTT  BMI>30 no  AMA no  First degree relative with type 2 diabetes YES  History of chronic HTN, hyperlipidemia, elevated A1C no  High risk race (, , ,  or ) no    Hypertension- if you answer yes, please order preeclampsia labs (cbc, comprehensive metabolic panel, urine protein creatinine ratio, uric acid)  History of of chronic HTN no  History of gestational HTN no  History of preeclampsia, eclampsia, or HELLP syndrome no  History of diabetes no  History of lupus, autoimmune disease, kidney disease no    Thyroid- if yes order TSH with reflex T4  History of thyroid disease YES    Bleeding Disorder or Hx of DVT-patient or first degree relative with history of  Order the following if not done previously     (Factor V, antithrombin III, prothrombin gene mutation, protein C and S Ag, lupus anticoagulant, anticardiolipin, beta-2 glycoprotein)   no    OB/GYN-  History of abnormal pap smear no       Date of last pap smear 4/10/2023  History of HPV no  History of Herpes/HSV no  History of other STI (gonorrhea, chlamydia, trich) no  History of prior  YES  History of prior  no  History of  delivery prior to 36 weeks 6 days YES, pprom- resolved placenta previa  History of blood transfusion no  Ok for blood transfusion yes    Substance screening- if yes outside of tobacco for her or anyone in her home-order urine drug screen  History of tobacco use no  Currently using tobacco no  Currently using alcohol no  Presently using drugs no  Past drug use  no  IV drug use- no  Partner drug use no  Parent/Family drug use no    MRSA Screening-   Does the pt have a hx of MRSA? no  If yes- please follow MRSA protocol and obtain a nasal swab for MRSA culture    Immunizations:  Influenza vaccine given this season yes  Discussed Tdap vaccine yes  Discussed COVID Vaccine yes, J &J    Genetic/M-  Do you or your partner have a history of any of the following in yourselves or first degree relatives? Cystic fibrosis no  Spinal muscular atrophy no  Hemoglobinopathy/Sickle Cell/Thalassemia no  Fragile X Intellectual Disability no    If yes, discuss carrier screening and recommend consultation with [facility]/genetic counseling  If no, discuss option for carrier screening and/or genetic testing with [facility]uch[facility] Ul[facility]   Patient interested yes  Appointment at [facility] made yes 2023    Interview education  [facility] Pregnancy Essentials Book reviewed, discussed and attached to their AVS yes    Nurse/Family Partnership- patient may qualify no; referral placed no    Prenatal lab work scripts yes    Extra labs ordered:  TSH    Aspirin/Preeclampsia Screen    Risk Level Risk Factor Recommendation   LOW Prior Uncomplicated full-term delivery no No Aspirin recommendation        MODERATE Nulliparity no Recommend low-dose aspirin if     BMI>30 no 2 or more moderate risk factors    Family History Preeclampsia (mother/sister) no     35yr old or greater YES      or Low Socioeconomic no     IVF Pregnancy  no     Personal History Risks (low birth weight, prior adverse preg outcome, >10yr preg interval) YES PPROM, PRE-TERM DELIVERY 33 weeks        HIGH History of Preeclampsia no Recommend low-dose aspirin if     Multifetal gestation no 1 or more high risk factors    Chronic HTN no     Type 1 or 2 Diabetes no     Renal Disease no     Autoimmune Disease  no      Contraindications to ASA therapy:  NSAID/ ASA allergy: no  Nasal polyps: no  Asthma with history of ASA induced bronchospasm: no  Relative contraindications:  History of GI bleed: no  Active peptic ulcer disease: no  Severe hepatic dysfunction: no    Patient should be recommended to take ASA 162mg during this pregnancy from 12-36wks to lower her risk of preeclampsia: yes      The patient has a history now or in prior pregnancy notable for:  placenta previa, 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  Details that I feel the provider should be aware of: This was an unplanned but welcomed pregnancy for Mehdi and her  Irvin  They share a 10 yr old daughter Rayshawn Ceballos who was born at [facility]  Ekaterina Koch had a pre term delivery at 33 weeks  She had PROM, and the pregnancy was complicated by a placenta previa which was resolved before delivery so she was able to have a spontaneous vaginal delivery  Mehdi also has a 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 in the [employer] department  PN1 visit scheduled  The patient was oriented to our practice, reviewed delivering physicians and [address] [place] [address] for Delivery  All questions were answered      Interviewed by: Elina Dumas RN

## 2023-06-05 NOTE — PATIENT INSTRUCTIONS
Congratulations!! Please review our Pregnancy Essential Guide and [facility] L&D Virtual tour from our [employer]  [facility] Pregnancy Essentials Guide  [facility] ([address] [facility])     [address] ([email or web address])

## 2023-06-09 PROBLEM — O09.521 MULTIGRAVIDA OF ADVANCED MATERNAL AGE IN FIRST TRIMESTER: Status: ACTIVE | Noted: 2023-06-09

## 2023-06-14 ENCOUNTER — TELEPHONE (OUTPATIENT)
Dept: OBGYN CLINIC | Facility: CLINIC | Age: 38
End: 2023-06-14

## 2023-06-16 ENCOUNTER — INITIAL PRENATAL (OUTPATIENT)
Dept: OBGYN CLINIC | Facility: CLINIC | Age: 38
End: 2023-06-16
Payer: COMMERCIAL

## 2023-06-16 ENCOUNTER — APPOINTMENT (OUTPATIENT)
Dept: LAB | Facility: HOSPITAL | Age: 38
End: 2023-06-16
Payer: COMMERCIAL

## 2023-06-16 VITALS — SYSTOLIC BLOOD PRESSURE: 138 MMHG | DIASTOLIC BLOOD PRESSURE: 70 MMHG | WEIGHT: 193 LBS | BODY MASS INDEX: 31.15 KG/M2

## 2023-06-16 DIAGNOSIS — Z92.89 HISTORY OF THERAPEUTIC ABORTION: ICD-10-CM

## 2023-06-16 DIAGNOSIS — Z3A.14 14 WEEKS GESTATION OF PREGNANCY: ICD-10-CM

## 2023-06-16 DIAGNOSIS — Z34.81 PRENATAL CARE, SUBSEQUENT PREGNANCY, FIRST TRIMESTER: Primary | ICD-10-CM

## 2023-06-16 DIAGNOSIS — Z34.81 PRENATAL CARE, SUBSEQUENT PREGNANCY, FIRST TRIMESTER: ICD-10-CM

## 2023-06-16 DIAGNOSIS — O09.521 MULTIGRAVIDA OF ADVANCED MATERNAL AGE IN FIRST TRIMESTER: ICD-10-CM

## 2023-06-16 DIAGNOSIS — E03.8 OTHER SPECIFIED HYPOTHYROIDISM: ICD-10-CM

## 2023-06-16 PROBLEM — O09.899 HISTORY OF PREMATURE DELIVERY, CURRENTLY PREGNANT: Status: ACTIVE | Noted: 2023-06-16

## 2023-06-16 LAB
ABO GROUP BLD: NORMAL
BACTERIA UR QL AUTO: ABNORMAL /HPF
BASOPHILS # BLD AUTO: 0.05 THOUSANDS/ÂΜL (ref 0–0.1)
BASOPHILS NFR BLD AUTO: 1 % (ref 0–1)
BILIRUB UR QL STRIP: NEGATIVE
BLD GP AB SCN SERPL QL: NEGATIVE
CLARITY UR: ABNORMAL
COLOR UR: ABNORMAL
EOSINOPHIL # BLD AUTO: 0.07 THOUSAND/ÂΜL (ref 0–0.61)
EOSINOPHIL NFR BLD AUTO: 1 % (ref 0–6)
ERYTHROCYTE [DISTWIDTH] IN BLOOD BY AUTOMATED COUNT: 18.6 % (ref 11.6–15.1)
GLUCOSE UR STRIP-MCNC: NEGATIVE MG/DL
HBV SURFACE AG SER QL: NORMAL
HCT VFR BLD AUTO: 35.9 % (ref 34.8–46.1)
HCV AB SER QL: NORMAL
HGB BLD-MCNC: 11.3 G/DL (ref 11.5–15.4)
HGB UR QL STRIP.AUTO: NEGATIVE
HIV 1+2 AB+HIV1 P24 AG SERPL QL IA: NORMAL
HIV 2 AB SERPL QL IA: NORMAL
HIV1 AB SERPL QL IA: NORMAL
HIV1 P24 AG SERPL QL IA: NORMAL
IMM GRANULOCYTES # BLD AUTO: 0.09 THOUSAND/UL (ref 0–0.2)
IMM GRANULOCYTES NFR BLD AUTO: 1 % (ref 0–2)
KETONES UR STRIP-MCNC: NEGATIVE MG/DL
LEUKOCYTE ESTERASE UR QL STRIP: ABNORMAL
LYMPHOCYTES # BLD AUTO: 1.75 THOUSANDS/ÂΜL (ref 0.6–4.47)
LYMPHOCYTES NFR BLD AUTO: 18 % (ref 14–44)
MCH RBC QN AUTO: 25.5 PG (ref 26.8–34.3)
MCHC RBC AUTO-ENTMCNC: 31.5 G/DL (ref 31.4–37.4)
MCV RBC AUTO: 81 FL (ref 82–98)
MONOCYTES # BLD AUTO: 0.59 THOUSAND/ÂΜL (ref 0.17–1.22)
MONOCYTES NFR BLD AUTO: 6 % (ref 4–12)
MUCOUS THREADS UR QL AUTO: ABNORMAL
NEUTROPHILS # BLD AUTO: 6.99 THOUSANDS/ÂΜL (ref 1.85–7.62)
NEUTS SEG NFR BLD AUTO: 73 % (ref 43–75)
NITRITE UR QL STRIP: NEGATIVE
NON-SQ EPI CELLS URNS QL MICRO: ABNORMAL /HPF
NRBC BLD AUTO-RTO: 0 /100 WBCS
PH UR STRIP.AUTO: 5.5 [PH]
PLATELET # BLD AUTO: 325 THOUSANDS/UL (ref 149–390)
PMV BLD AUTO: 10.6 FL (ref 8.9–12.7)
PROT UR STRIP-MCNC: NEGATIVE MG/DL
RBC # BLD AUTO: 4.44 MILLION/UL (ref 3.81–5.12)
RBC #/AREA URNS AUTO: ABNORMAL /HPF
RH BLD: POSITIVE
RUBV IGG SERPL IA-ACNC: 202.6 IU/ML
SL AMB  POCT GLUCOSE, UA: NORMAL
SL AMB POCT URINE PROTEIN: NORMAL
SP GR UR STRIP.AUTO: 1.02 (ref 1–1.03)
TREPONEMA PALLIDUM IGG+IGM AB [PRESENCE] IN SERUM OR PLASMA BY IMMUNOASSAY: NORMAL
TSH SERPL DL<=0.05 MIU/L-ACNC: 1.44 UIU/ML (ref 0.45–4.5)
UROBILINOGEN UR STRIP-ACNC: <2 MG/DL
WBC # BLD AUTO: 9.54 THOUSAND/UL (ref 4.31–10.16)
WBC #/AREA URNS AUTO: ABNORMAL /HPF

## 2023-06-16 PROCEDURE — 86901 BLOOD TYPING SEROLOGIC RH(D): CPT

## 2023-06-16 PROCEDURE — 86850 RBC ANTIBODY SCREEN: CPT

## 2023-06-16 PROCEDURE — 86803 HEPATITIS C AB TEST: CPT

## 2023-06-16 PROCEDURE — 86900 BLOOD TYPING SEROLOGIC ABO: CPT

## 2023-06-16 PROCEDURE — 85025 COMPLETE CBC W/AUTO DIFF WBC: CPT

## 2023-06-16 PROCEDURE — PNV: Performed by: PHYSICIAN ASSISTANT

## 2023-06-16 PROCEDURE — 86780 TREPONEMA PALLIDUM: CPT

## 2023-06-16 PROCEDURE — 87086 URINE CULTURE/COLONY COUNT: CPT

## 2023-06-16 PROCEDURE — 87389 HIV-1 AG W/HIV-1&-2 AB AG IA: CPT

## 2023-06-16 PROCEDURE — 87340 HEPATITIS B SURFACE AG IA: CPT

## 2023-06-16 PROCEDURE — 86762 RUBELLA ANTIBODY: CPT

## 2023-06-16 PROCEDURE — 81002 URINALYSIS NONAUTO W/O SCOPE: CPT | Performed by: PHYSICIAN ASSISTANT

## 2023-06-16 PROCEDURE — 84443 ASSAY THYROID STIM HORMONE: CPT

## 2023-06-16 PROCEDURE — 36415 COLL VENOUS BLD VENIPUNCTURE: CPT

## 2023-06-16 PROCEDURE — 81001 URINALYSIS AUTO W/SCOPE: CPT

## 2023-06-16 NOTE — PROGRESS NOTES
44 y o  [id number] at 14w4d with Estimated Date of Delivery: 23 established by early US  She denies nausea/vomiting and bleeding/cramping  Prenatal labs: completed this AM and wnl    Genetic screening: was scheduled with [facility] but pt had to cancel  She is interested in NIPT- will call [facility] to schedule this     OB history: 33 wk  - complicated by PPROM at 32 wks; suspected placental abruption with delivery  Hx of IAB in - 17 wks; Javier syndrome    GYN history: Last pap smear 2023 negative  No history of STDs  Past medical history: hypothyroidism- taking levothyroxine 125 mcg    Past surgical history: none    Social history: Denies tobacco, alcohol, or illicit drug use  Family history:   DVT/PE: none  Cancer: none  Heart disease: none  Stroke: none    Physical exam:     Fetal heart tones: 155    Patient appears well and is not in distress  Neck is supple without masses  Breast exam deferred as she had annual 2023  Abdomen is soft and nontender without masses  GYN exam deferred as she had annual in early pregnancy 2023        Discussed as well during this visit was diet, prenatal vitamins, prenatal visits, lab testing, breast feeding, vaccinations, maternal fetal medicine consultations, and lifestyle        ASSESSMENT/PLAN   Problem List Items Addressed This Visit        Endocrine    Hypothyroidism     Taking levothyroxine 125 mcg  TSH with PNL within normal range            Other    14 weeks gestation of pregnancy     Advised to start 162 mg ASA- AMA, BMI 30, hx PPROM/ delivery  Advised to start PNV with iron or additional iron supplement with current gummy prenatal vitamin         Multigravida of advanced maternal age in first trimester     Start ASA  Will call [facility] to schedule NIPT  Level 2 scheduled         History of therapeutic      - @ 17 wks; baby with Javier's syndrome        Other Visit Diagnoses     Prenatal care, subsequent pregnancy, first trimester    - Primary    Relevant Orders    POCT urine dip (Completed)          1 - Gonorrhea and Chlamydia cultures- negative on 4/10/2023  2 - Pap smear with HPV co-testing - neg/neg on 4/10/2023  3 - Prenatal labs reviewed   4 - Genetic screening - advised to call MFM to schedule NIPT  5 - RTO in 4 weeks for routine PN visit  6 - ASA - advised to start 162 mg ASA  Blue packet given and reviewed     All questions were answered & Venelis expressed understanding

## 2023-06-16 NOTE — PROGRESS NOTES
Patient presents for Pn1 visit  [id number]  LMP- 3/14/23  NIDHI- 12/11/2023  GA- 14w4d    Urine- neg/neg  Last pap- 4/10/2023 neg neg  Had GC collected back in April     Pn1 labs completed  [facility] [place] folder given at today's visit and thoroughly reviewed  No LOF, bleeding, cramping or discharge  Would like to go over lab results  No questions or concerns for today's visit

## 2023-06-17 LAB
BACTERIA UR CULT: ABNORMAL
BACTERIA UR CULT: ABNORMAL

## 2023-06-17 NOTE — ASSESSMENT & PLAN NOTE
2017- Hx of PPROM at 32 wks;  at 33 wks complicated by placental abruption- at [facility]

## 2023-06-17 NOTE — ASSESSMENT & PLAN NOTE
Advised to start 162 mg ASA- AMA, BMI 30, hx PPROM/ delivery  Advised to start PNV with iron or additional iron supplement with current gummy prenatal vitamin

## 2023-06-20 ENCOUNTER — HOSPITAL ENCOUNTER (EMERGENCY)
Facility: HOSPITAL | Age: 38
Discharge: HOME | End: 2023-06-20
Attending: EMERGENCY MEDICINE
Payer: COMMERCIAL

## 2023-06-20 ENCOUNTER — TELEPHONE (OUTPATIENT)
Dept: OBGYN CLINIC | Facility: CLINIC | Age: 38
End: 2023-06-20

## 2023-06-20 VITALS
OXYGEN SATURATION: 100 % | BODY MASS INDEX: 31.06 KG/M2 | RESPIRATION RATE: 18 BRPM | DIASTOLIC BLOOD PRESSURE: 76 MMHG | SYSTOLIC BLOOD PRESSURE: 129 MMHG | WEIGHT: 192.46 LBS | HEART RATE: 88 BPM | TEMPERATURE: 98 F

## 2023-06-20 DIAGNOSIS — Z3A.15 15 WEEKS GESTATION OF PREGNANCY: ICD-10-CM

## 2023-06-20 DIAGNOSIS — N93.9 VAGINAL BLEEDING: Primary | ICD-10-CM

## 2023-06-20 PROCEDURE — 99284 EMERGENCY DEPT VISIT MOD MDM: CPT

## 2023-06-20 NOTE — ED ATTENDING ATTESTATION
6/20/2023  [place], , saw and evaluated the patient  I have discussed the patient with the resident/non-physician practitioner and agree with the resident's/non-physician practitioner's findings, Plan of Care, and MDM as documented in the resident's/non-physician practitioner's note, except where noted  All available labs and Radiology studies were reviewed  I was present for key portions of any procedure(s) performed by the resident/non-physician practitioner and I was immediately available to provide assistance  At this point I agree with the current assessment done in the Emergency Department  I have conducted an independent evaluation of this patient a history and physical is as follows:    ED Course     44 y o  [id number] F at approx 15 weeks pregnant p/w vaginal spotting x today  Noticed some spotting when she wiped this morning  Pt called her OBGYN today to make them aware of spotting  She was instructed to call them back or go to the ER if she was going though a pad an hour  Pt reports to the ER for spotting  No heavy bleeding or abd pain  Pt is O+  Bedside US shows IUP w/ FHR in 150s  OBGYN f/u      Critical Care Time  Procedures

## 2023-06-20 NOTE — TELEPHONE ENCOUNTER
Spoke to patient and very scant amount when wiping this morning  Blood type is +  No IC recerntly  Last appointment was 6/16 no exam  Advised to monitor with clean pad- if more than I pad n hr that is too much and let is know

## 2023-06-20 NOTE — DISCHARGE INSTRUCTIONS
You have been seen for vaginal bleeding while pregnant  You should return to the ED if you develop heavy bleeding, abdominal pain, lightheadedness, passing clots, or other worsening symptoms  Follow up with OBGYN

## 2023-06-20 NOTE — ED PROVIDER NOTES
History  Chief Complaint   Patient presents with   • Vaginal Bleeding - Pregnant     Pt is 15 weeks, , woke up this morning with red spotting after she went to the bathroom called the obgyn and they did explain that if she is bleeding that fills a pad an hour that she should come to ED  Pt is still spotting but now a brownish color  Pt denies abdominal pain      42-year-old female with a past medical history of hypothyroidism who is a  at 15 weeks gestation presents with vaginal bleeding  Patient states that she woke up this morning with some red spotting in her underwear  It has continued throughout the day, but has not become any heavier  She states that it is a very scant amount  She states that the bleeding is now brown in color  She has no abdominal pain  No recent fevers  Patient has been getting regular prenatal care here at [place] [address]  She has had no issues so far in her pregnancy  Patient called the OB office and they told her to come to the ER if the spotting becomes significant and fills a pad an hour  Patient is Rh positive  Prior to Admission Medications   Prescriptions Last Dose Informant Patient Reported? Taking? Prenatal Vit-Fe Fumarate-FA (PRENATAL PO)  Self Yes No   Sig: Take by mouth   levothyroxine 125 mcg tablet   No No   Sig: Take 1 tablet (125 mcg total) by mouth daily      Facility-Administered Medications: None       Past Medical History:   Diagnosis Date   • Hypothyroidism        History reviewed  No pertinent surgical history  Family History   Problem Relation Age of Onset   • Diabetes Mother    • No Known Problems Half-Sister    • No Known Problems Brother    • No Known Problems Daughter    • No Known Problems Maternal Grandmother    • No Known Problems Paternal Grandmother    • No Known Problems Paternal Grandfather      I have reviewed and agree with the history as documented      E-Cigarette/Vaping   • E-Cigarette Use Never User      E-Cigarette/Vaping Substances     Social History     Tobacco Use   • Smoking status: Never     Passive exposure: Never   • Smokeless tobacco: Never   Vaping Use   • Vaping Use: Never used   Substance Use Topics   • Alcohol use: Not Currently     Alcohol/week: 2 0 standard drinks of alcohol     Types: 2 Glasses of wine per week   • Drug use: Never        Review of Systems   Constitutional: Negative for chills, fatigue and fever  HENT: Negative for congestion, rhinorrhea and sore throat  Eyes: Negative for pain and redness  Respiratory: Negative for cough, chest tightness, shortness of breath and wheezing  Cardiovascular: Negative for chest pain and palpitations  Gastrointestinal: Negative for abdominal pain, diarrhea, nausea and vomiting  Endocrine: Negative  Genitourinary: Positive for vaginal bleeding  Negative for difficulty urinating, dysuria, hematuria, pelvic pain and vaginal discharge  Musculoskeletal: Negative for back pain and myalgias  Skin: Negative for pallor and rash  Allergic/Immunologic: Negative  Neurological: Negative for dizziness, weakness, light-headedness and headaches  Hematological: Negative  Physical Exam  ED Triage Vitals [06/20/23 1349]   Temperature Pulse Respirations Blood Pressure SpO2   98 °F (36 7 °C) 88 18 129/76 100 %      Temp src Heart Rate Source Patient Position - Orthostatic VS BP Location FiO2 (%)   -- -- Sitting Right arm --      Pain Score       --             Orthostatic Vital Signs  Vitals:    06/20/23 1349   BP: 129/76   Pulse: 88   Patient Position - Orthostatic VS: Sitting       Physical Exam  Vitals and nursing note reviewed  Constitutional:       General: She is not in acute distress  Appearance: Normal appearance  She is not ill-appearing  HENT:      Head: Normocephalic and atraumatic  Eyes:      Conjunctiva/sclera: Conjunctivae normal    Cardiovascular:      Rate and Rhythm: Normal rate and regular rhythm     Pulmonary:      Effort: Pulmonary effort is normal  No respiratory distress  Abdominal:      General: Abdomen is flat  There is no distension  Palpations: Abdomen is soft  Tenderness: There is no abdominal tenderness  There is no guarding or rebound  Musculoskeletal:         General: Normal range of motion  Cervical back: Normal range of motion and neck supple  Skin:     General: Skin is warm and dry  Neurological:      General: No focal deficit present  Mental Status: She is alert and oriented to person, place, and time  ED Medications  Medications - No data to display    Diagnostic Studies  Results Reviewed     None                 No orders to display         Procedures  POC Pelvic US    Date/Time: 6/20/2023 3:13 PM    Performed by: Sena Johns DO  Authorized by: Sena Johns DO    Patient location:  ED  Procedure details:     Exam Type:  Diagnostic    Indications: pregnant with vaginal bleeding      Assessment for: evaluate fetal viability      Technique:  Transabdominal obstetric (HCG+) exam    Views obtained: uterus (transverse and sagittal) and pouch of Allen      Image quality: diagnostic      Image availability:  Images available in PACS  Uterine findings:     Intrauterine pregnancy: identified      Single gestation: identified      Fetal heart rate: identified      Fetal heart rate (bpm):  156  Interpretation:     Pregnancy findings: intrauterine pregnancy (IUP)            ED Course  ED Course as of 06/20/23 1546   Tue Jun 20, 2023   1445 OB Dr Myriam Hunt recommends speculum exam  If it is scant bleeding, nothing else to do                             SBIRT 20yo+    Flowsheet Row Most Recent Value   Initial Alcohol Screen: US AUDIT-C     1  How often do you have a drink containing alcohol? 0 Filed at: 06/20/2023 1351   2  How many drinks containing alcohol do you have on a typical day you are drinking? 0 Filed at: 06/20/2023 1351   3a  Male UNDER 65:  How often do you have five or more drinks on one occasion? 0 Filed at: 06/20/2023 1351   3b  FEMALE Any Age, or MALE 65+: How often do you have 4 or more drinks on one occassion? 0 Filed at: 06/20/2023 1351   Audit-C Score 0 Filed at: 06/20/2023 1351   KATHERINE: How many times in the past year have you    Used an illegal drug or used a prescription medication for non-medical reasons? Never Filed at: 06/20/2023 1351                Medical Decision Making  65-year-old female who is 15 weeks pregnant presents with spotting  Patient is nontender on exam   Fetus has a normal heart rate at 156 bpm   We will reach out to [facility] for further recommendations, but there is no pain or significant bleeding, making miscarriage less likely  OB recommended speculum exam which was done at bedside  Pelvic exam performed at bedside with nursing supervision  No lesions on the labia  No lesions or masses in the vaginal canal  Cervix is nontender  Scant brown blood in the vaginal canal   No pooling  Discussed case with OB who recommended discharge  Patient was given strict return precautions  Discussed all results and plans with patient  Recommend follow up with OB/GYN  Return precautions given  All questions answered           15 weeks gestation of pregnancy: acute illness or injury  Vaginal bleeding: acute illness or injury  Amount and/or Complexity of Data Reviewed  External Data Reviewed:      Details: Reviewed past medical history and medications            Disposition  Final diagnoses:   Vaginal bleeding   15 weeks gestation of pregnancy     Time reflects when diagnosis was documented in both MDM as applicable and the Disposition within this note     Time User Action Codes Description Comment    6/20/2023  3:27 PM Erik Luis Add [N93 9] Vaginal bleeding     6/20/2023  3:27 PM Erik Luis Add [Z3A 15] 15 weeks gestation of pregnancy       ED Disposition     ED Disposition   Discharge    Condition   Good    Date/Time   Tue Jun 20, 2023  3:27 PM    Comment   Mehdi CORBIN Gabby Cos discharge to home/self care  Follow-up Information     Follow up With Specialties Details Why Contact Info    Marlin Cain PA-C Physician Assistant, Obstetrics and Gynecology, Obstetrics, Gynecology   [address] [facility] [address] [address] [place]  [phone]            Discharge Medication List as of 6/20/2023  3:28 PM      CONTINUE these medications which have NOT CHANGED    Details   levothyroxine 125 mcg tablet Take 1 tablet (125 mcg total) by mouth daily, Starting Wed 5/10/2023, Normal      Prenatal Vit-Fe Fumarate-FA (PRENATAL PO) Take by mouth, Historical Med           No discharge procedures on file  PDMP Review     None           ED Provider  Attending physically available and evaluated [address] CORBIN managed the patient along with the ED Attending      Electronically Signed by         Keven Ricks DO  06/20/23 3954

## 2023-07-13 NOTE — PROGRESS NOTES
Prenatal Visit   18w3d    PN1 completed  Nuchal completed   AFP given today   Level 2 scheduled     Denies LOF, VB, or CTX.   Pt has +FM  Patients urine is   Patient has no questions/concerns today

## 2023-07-14 ENCOUNTER — ROUTINE PRENATAL (OUTPATIENT)
Dept: OBGYN CLINIC | Facility: CLINIC | Age: 38
End: 2023-07-14
Payer: COMMERCIAL

## 2023-07-14 VITALS — BODY MASS INDEX: 31.64 KG/M2 | DIASTOLIC BLOOD PRESSURE: 80 MMHG | SYSTOLIC BLOOD PRESSURE: 136 MMHG | WEIGHT: 196 LBS

## 2023-07-14 DIAGNOSIS — Z3A.18 18 WEEKS GESTATION OF PREGNANCY: ICD-10-CM

## 2023-07-14 DIAGNOSIS — O09.521 MULTIGRAVIDA OF ADVANCED MATERNAL AGE IN FIRST TRIMESTER: ICD-10-CM

## 2023-07-14 DIAGNOSIS — O09.899 HISTORY OF PREMATURE DELIVERY, CURRENTLY PREGNANT: ICD-10-CM

## 2023-07-14 DIAGNOSIS — E03.8 OTHER SPECIFIED HYPOTHYROIDISM: ICD-10-CM

## 2023-07-14 DIAGNOSIS — Z36.9 ENCOUNTER FOR ANTENATAL SCREENING, UNSPECIFIED: ICD-10-CM

## 2023-07-14 DIAGNOSIS — Z34.81 PRENATAL CARE, SUBSEQUENT PREGNANCY, FIRST TRIMESTER: Primary | ICD-10-CM

## 2023-07-14 DIAGNOSIS — Z33.1 PREGNANT STATE, INCIDENTAL: ICD-10-CM

## 2023-07-14 LAB
SL AMB  POCT GLUCOSE, UA: NORMAL
SL AMB POCT URINE PROTEIN: NORMAL

## 2023-07-14 PROCEDURE — PNV: Performed by: PHYSICIAN ASSISTANT

## 2023-07-14 PROCEDURE — 81002 URINALYSIS NONAUTO W/O SCOPE: CPT | Performed by: PHYSICIAN ASSISTANT

## 2023-07-15 PROBLEM — Z3A.18 18 WEEKS GESTATION OF PREGNANCY: Status: ACTIVE | Noted: 2023-05-22

## 2023-07-15 NOTE — ASSESSMENT & PLAN NOTE
2017- Hx of PPROM at 32 wks;  at 33 wks complicated by placental abruption- at [facility]  Pt was scheduled for appt with MFM in  and July which were no showed/canceled by patient  Strongly encouraged her to keep her upcoming appointments as scheduled  Will need CL with next US  See separate phone note offering pt option of starting vaginal progesterone suppositories. She would like to start this.  Rx sent to [facility]- see phone encounter

## 2023-07-15 NOTE — ASSESSMENT & PLAN NOTE
Juan Carlos Beckman  is a 44 y.o. [id number] @18w5d who presents for routine prenatal visit. Denies OB complaints. Reports feeling hyper aware of urogenital odor since pregnancy started. She is unsure if the hormones are making her more aware of smells. Has a hard time describing odor. States she smells urine. This has been present since she found out she was pregnant and remains unchanged. Denies itching, irritation, dysuria, back pain. Had negative urine cultures with prenatal labs. She also had a negative molecular panel at the time of her early [address] and negative gc/chlam in early pregnancy. Offered repeat molecular panel and gc/chlam screening today but patient declines. States if odor or discharge changes in any way she will notify the office for repeat testing. NIPT- scheduled with [facility]; she was a no show for appt on 7/6/2023  MASFP- ordered today; reviewed test timing  Level II scheduled     Good fetal movement. Denies contractions, cramping, leakage of fluid or vaginal bleeding. Reviewed PTL precautions and reasons to call.

## 2023-07-15 NOTE — PROGRESS NOTES
18 weeks gestation of pregnancy  Mehdi  is a 44 y.o. [id number] @18w5d who presents for routine prenatal visit. Denies OB complaints. Reports feeling hyper aware of urogenital odor since pregnancy started. She is unsure if the hormones are making her more aware of smells. Has a hard time describing odor. States she smells urine. This has been present since she found out she was pregnant and remains unchanged. Denies itching, irritation, dysuria, back pain. Had negative urine cultures with prenatal labs. She also had a negative molecular panel at the time of her early [address] and negative gc/chlam in early pregnancy. Offered repeat molecular panel and gc/chlam screening today but patient declines. States if odor or discharge changes in any way she will notify the office for repeat testing. NIPT- scheduled with MFM; she was a no show for appt on 2023  MASFP- ordered today; reviewed test timing  Level II scheduled     Good fetal movement. Denies contractions, cramping, leakage of fluid or vaginal bleeding. Reviewed PTL precautions and reasons to call. History of premature delivery, currently pregnant  - Hx of PPROM at 28 wks;  at 35 wks complicated by placental abruption- at [facility]  Pt was scheduled for appt with MFM in  and July which were no showed/canceled by patient  Strongly encouraged her to keep her upcoming appointments as scheduled  Will need CL with next US  See separate phone note offering pt option of starting vaginal progesterone suppositories. She would like to start this.  Rx sent to [address] [place] [address]- see phone encounter    Hypothyroidism  Taking levothyroxine 125 mcg  Due for repeat TSH; orders placed    Multigravida of advanced maternal age in first trimester  Reminded to take 162 mg ASA daily

## 2023-07-17 ENCOUNTER — APPOINTMENT (OUTPATIENT)
Dept: LAB | Facility: HOSPITAL | Age: 38
End: 2023-07-17
Payer: COMMERCIAL

## 2023-07-17 DIAGNOSIS — Z33.1 PREGNANT STATE, INCIDENTAL: ICD-10-CM

## 2023-07-17 DIAGNOSIS — E03.8 OTHER SPECIFIED HYPOTHYROIDISM: ICD-10-CM

## 2023-07-17 DIAGNOSIS — Z36.9 ENCOUNTER FOR ANTENATAL SCREENING, UNSPECIFIED: ICD-10-CM

## 2023-07-17 LAB — TSH SERPL DL<=0.05 MIU/L-ACNC: 0.85 UIU/ML (ref 0.45–4.5)

## 2023-07-17 PROCEDURE — 84443 ASSAY THYROID STIM HORMONE: CPT

## 2023-07-17 PROCEDURE — 36415 COLL VENOUS BLD VENIPUNCTURE: CPT

## 2023-07-17 PROCEDURE — 82105 ALPHA-FETOPROTEIN SERUM: CPT

## 2023-07-18 ENCOUNTER — TELEPHONE (OUTPATIENT)
Dept: OBGYN CLINIC | Facility: CLINIC | Age: 38
End: 2023-07-18

## 2023-07-18 DIAGNOSIS — O09.899 HISTORY OF PREMATURE DELIVERY, CURRENTLY PREGNANT: Primary | ICD-10-CM

## 2023-07-18 NOTE — TELEPHONE ENCOUNTER
PC placed to patient. Received update TSH which is wnl. Reminded pt to take 162 mg ASA. She is currently only taking 81 mg ASA. Upon review of chart noted that she is not seeing MFM until 23 and has hx of PPROM and PTD at 33 weeks. Did not go for early nuchal scan with MFM so has not yet been counseled on option of vaginal progesterone suppository. Reviewed that in woman with hx of spontaneous  delivery vaginal progesterone (200 mg) from 16-36 wks gestation can help prevent recurrence but reviewed this data is somewhat conflicting and mixed. She would be interested in starting vaginal progesterone suppositories.  Rx sent to [facility]

## 2023-07-19 LAB
2ND TRIMESTER 4 SCREEN SERPL-IMP: NORMAL
AFP ADJ MOM SERPL: 0.68
AFP INTERP AMN-IMP: NORMAL
AFP INTERP SERPL-IMP: NORMAL
AFP INTERP SERPL-IMP: NORMAL
AFP SERPL-MCNC: 29.3 NG/ML
AGE AT DELIVERY: 39.3 YR
GA METHOD: NORMAL
GA: 19 WEEKS
IDDM PATIENT QL: NO
MULTIPLE PREGNANCY: NO
NEURAL TUBE DEFECT RISK FETUS: NORMAL %

## 2023-07-21 ENCOUNTER — TELEPHONE (OUTPATIENT)
Facility: HOSPITAL | Age: 38
End: 2023-07-21

## 2023-07-21 NOTE — TELEPHONE ENCOUNTER
PT called Maia Simental office looking to r/s yesterday's VV Meagan GODINEZ. PT stated she never got a text and she was not able to join through [employer]. I verified w/Andree that a text was sent and apologized to PT if she did not get it. I also verified that this class is done through the link sent as a text, not in her MyChart. PT verbalized understanding and stated she never got the text. Offered to r/s for next Thursday class, or confirmed we will offer bloodwork to PT at her 8/1 appt. PT wanted to r/s, so we did. PT instructed if she has any problems connecting next Thursday to call office.

## 2023-07-26 ENCOUNTER — TELEPHONE (OUTPATIENT)
Dept: OBGYN CLINIC | Facility: CLINIC | Age: 38
End: 2023-07-26

## 2023-07-26 NOTE — TELEPHONE ENCOUNTER
Hemostasis started on the right radial artery. R-Band was used in achieving hemostasis. Radial compression device applied to vessel. Hemostasis achieved successfully. Closure device additional comment: 12cc of air Pt is requesting blood work to find out the gender of the baby. I told her I think [facility] orders that but I would double check. She has an appt w/ MFM tomorrow so she will also ask them. Please let pt know.

## 2023-07-27 ENCOUNTER — TELEMEDICINE (OUTPATIENT)
Dept: PERINATAL CARE | Facility: CLINIC | Age: 38
End: 2023-07-27

## 2023-07-27 DIAGNOSIS — O09.529 ANTEPARTUM MULTIGRAVIDA OF ADVANCED MATERNAL AGE: Primary | ICD-10-CM

## 2023-07-27 PROCEDURE — NC001 PR NO CHARGE: Performed by: GENETIC COUNSELOR, MS

## 2023-07-27 NOTE — PROGRESS NOTES
Patient attended the Genetic Screening Education Session via [employer]. The group reviewed basic chromosome information and the increasing risk for aneuploidy based on patient age. Copy Number variants (microdeletions/duplications) were also reviewed with a general population risk of 0.4% in any pregnancy. The group discussed the options for prenatal screening and diagnosis for chromosomal abnormalities. The benefits and limitations of fetal anatomy ultrasound at 20 weeks gestation were reviewed. Quad screening consists of second trimester biochemical analysis. Results provide a numerical risk for Down syndrome, Trisomy 18, and open neural tube defects. Group attendees were instructed to contact their OB office if they desired Quad screen. Cell-free fetal DNA (NIPT) screening analyzes placental DNA in maternal serum and can assess the risk for Down syndrome, trisomy 25, trisomy 15, and sex chromosome anomalies. Results report as screen negative or screen positive, and fetal sex information is provided. The possibility of no-result and increased risk result was addressed. Maternal serum AFP screening is recommended at 16-18 weeks to screen for open neural tube defects. The benefits and limitations of these screens, including detection rates and false positive rates, were reviewed. Prenatal diagnostic testing is available via amniocentesis. The timing, risks (including their associated risks of miscarriage) and benefits were reviewed. Lastly, we reviewed that all pregnancies have a 3-6% risk of birth defect, genetic condition, or intellectual disability regardless of age or personal/family history. There is no available testing to rule out all conditions. We reveiwed potential costs associated with cell-free fetal DNA (NIPT) screen and provided resources, including information to check out of pocket cost with their insurance. The self pay price of $99 was also discussed.   Group attendees were instructed to contact [employer] (phone number provided) if they were interested in pursuing NIPT.

## 2023-08-01 ENCOUNTER — ROUTINE PRENATAL (OUTPATIENT)
Age: 38
End: 2023-08-01
Payer: COMMERCIAL

## 2023-08-01 VITALS
HEART RATE: 86 BPM | DIASTOLIC BLOOD PRESSURE: 62 MMHG | SYSTOLIC BLOOD PRESSURE: 112 MMHG | BODY MASS INDEX: 33.3 KG/M2 | HEIGHT: 66 IN | WEIGHT: 207.2 LBS

## 2023-08-01 DIAGNOSIS — O09.899 HISTORY OF PREMATURE DELIVERY, CURRENTLY PREGNANT: ICD-10-CM

## 2023-08-01 DIAGNOSIS — Z3A.21 21 WEEKS GESTATION OF PREGNANCY: ICD-10-CM

## 2023-08-01 DIAGNOSIS — Z82.79 FAMILY HISTORY OF SEX CHROMOSOME ANEUPLOIDY: ICD-10-CM

## 2023-08-01 DIAGNOSIS — O09.522 MULTIGRAVIDA OF ADVANCED MATERNAL AGE IN SECOND TRIMESTER: ICD-10-CM

## 2023-08-01 DIAGNOSIS — O35.HXX0 MATERNAL CARE FOR OTHER (SUSPECTED) FETAL ABNORMALITY AND DAMAGE, FETAL LOWER EXTREMITIES ANOMALIES, NOT APPLICABLE OR UNSPECIFIED: Primary | ICD-10-CM

## 2023-08-01 PROCEDURE — 76817 TRANSVAGINAL US OBSTETRIC: CPT | Performed by: OBSTETRICS & GYNECOLOGY

## 2023-08-01 PROCEDURE — 99244 OFF/OP CNSLTJ NEW/EST MOD 40: CPT | Performed by: OBSTETRICS & GYNECOLOGY

## 2023-08-01 PROCEDURE — 76811 OB US DETAILED SNGL FETUS: CPT | Performed by: OBSTETRICS & GYNECOLOGY

## 2023-08-01 PROCEDURE — 36415 COLL VENOUS BLD VENIPUNCTURE: CPT | Performed by: OBSTETRICS & GYNECOLOGY

## 2023-08-01 NOTE — PROGRESS NOTES
Patient chose to have [facility] Non-invasive Prenatal Screen with fetal sex. Patient given brochure and is aware [facility] will contact their insurance and coordinate coverage. Patient made aware she will need to respond to text message or e-mail from [address] within 2 business days or testing will be run through insurance. Patient informed text message will come from area code  "415". Provided [employer] # [phone] and web site : [email or web address]   "[place] your test online" card with barcode and test tube ID provided to patient. Reviewed [employer]'s web site states 5-7 business days for results via their portal.   [employer] message will be sent to patient when [facility] receives results /provider reviews. 2 vials of blood drawn from left arm by Sandra Ho MA. Patient tolerated blood draw without difficulty. Specimens labeled with patient identifiers (name, date of birth, specimen collection date), order and specimen were verified with patient, packed and sent via [address]. [employer]  tracking #  [id number]  Copy of lab order scanned to Epic media. Maternal Fetal Medicine will have results in approximately 7-10 business days and will call patient or notify via [address] [place] [address]. Patient aware viewing lab result online will reveal fetal sex if ordered. Patient verbalized understanding of all instructions and no questions at this time.

## 2023-08-01 NOTE — PROGRESS NOTES
OFFICE CONSULT  Bert Villa, [address],  [facility]     Dear Bert Villa     Thank you for requesting a  consultation on your patient Krupa Barksdale for the following indications: Fetal anatomical survey    History  Past medical history: hypothyroidism and advanced maternal age. 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 Social history: Does not report substance use  First generation family history: Family history of clubfeet in both her brother and a maternal cousin    Ultrasound findings: Today's ultrasound shows a fetus that is growing concordant with her dates. NANI is normal.  Her cervical length is normal.  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 No other malformations are detected however the views of the cardiac septum are limited secondary to fetal position. The patient was informed of the findings and counseled about the limitations of the exam in detecting all forms of fetal congenital abnormalities. She does not report any vaginal bleeding or uterine cramping/contractions. She does feel fetal movement. Specific counseling was provided on the following problems:  1. Offered genetic screening and she is not interested in invasive testing but was interested in NIPT today which was drawn. 2.  Bilateral talipes was discussed and this is likely secondary to family genetics since these findings are isolated .  I offered and gave her an order slip to schedule an pediatric orthopedic consult at 34 weeks to further discuss the normal  plans for babies who have talipes. 3.  Advanced Maternal Age (AMA) is defined as maternal age 28 or greater at the best estimation of the due date. Advanced maternal age is associated with an increased risk of several pregnancy outcomes, including aneuploidy/genetic syndromes, poor fetal growth, stillbirth, maternal hypertensive disorders, and gestational diabetes. Despite these increased risks, many women of advanced maternal age have normal, healthy pregnancy outcomes, particularly if they have no co-existing medical conditions. Risk of adverse outcomes is proportional to patient age. Recommend a third trimester ultrasound for fetal growth at 32 weeks. 4.  In the first trimester, the normal range for TSH level is 0.1 to 2.5 mIU/L; this level increases to 0.2 to 3.0 mIU/L in the second trimester and 0.3 to 3.0 mIU/L in the third trimester. Her TSH levels should be rechecked with each trimester and then her meds titrated to keep her TSH in the normal range for pregnancy. Concurrent use of iron or calcium and her thyroid meds may result in decreased effectiveness of her meds. Follow up recommended:   2-week ultrasound for her last transvaginal scan and missed anatomy. 32-week growth scan  34-week consult with pediatric orthopedics which Mehdi can schedule on her own. The referral was ordered. Pre visit time reviewing her records  10 minutes  Face to face time 20 minutes  Post visit time on documentation of note, updating her problem list, adding orders and prescriptions 20 minutes. Procedures that were completed today were charged separately. The level of decision making was low level complexity.     Wellington Dela Cruz MD

## 2023-08-01 NOTE — LETTER
August 3, 2023     Jin Gan[address]  [address]  [address] [address] [facility] [address] [address]    Patient: Mandie Gaviria   YOB: 1985   Date of Visit: 2023       Dear Dr. Hernandez Degree: Thank you for referring Mandie Gaviria to me for evaluation. Below are my notes for this consultation. If you have questions, please do not hesitate to call me. I look forward to following your patient along with you. Sincerely,        Aysha Faust MD        CC: No Recipients    Aysha Faust MD  8/3/2023  8:18 AM  Sign when Signing Visit  OFFICE [address]     Dear Jin Gan     Thank you for requesting a  consultation on your patient Mandie Gaviria for the following indications: Fetal anatomical survey    History  Past medical history: hypothyroidism and advanced maternal age. 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 Social history: Does not report substance use  First generation family history: Family history of clubfeet in both her brother and a maternal cousin    Ultrasound findings: Today's ultrasound shows a fetus that is growing concordant with her dates. NANI is normal.  Her cervical length is normal.  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 No other malformations are detected however the views of the cardiac septum are limited secondary to fetal position.     The patient was informed of the findings and counseled about the limitations of the exam in detecting all forms of fetal congenital abnormalities. She does not report any vaginal bleeding or uterine cramping/contractions. She does feel fetal movement. Specific counseling was provided on the following problems:  1. Offered genetic screening and she is not interested in invasive testing but was interested in NIPT today which was drawn. 2.  Bilateral talipes was discussed and this is likely secondary to family genetics since these findings are isolated . I offered and gave her an order slip to schedule an pediatric orthopedic consult at 34 weeks to further discuss the normal  plans for babies who have talipes. 3.  Advanced Maternal Age (AMA) is defined as maternal age 28 or greater at the best estimation of the due date. Advanced maternal age is associated with an increased risk of several pregnancy outcomes, including aneuploidy/genetic syndromes, poor fetal growth, stillbirth, maternal hypertensive disorders, and gestational diabetes. Despite these increased risks, many women of advanced maternal age have normal, healthy pregnancy outcomes, particularly if they have no co-existing medical conditions. Risk of adverse outcomes is proportional to patient age. Recommend a third trimester ultrasound for fetal growth at 32 weeks. 4.  In the first trimester, the normal range for TSH level is 0.1 to 2.5 mIU/L; this level increases to 0.2 to 3.0 mIU/L in the second trimester and 0.3 to 3.0 mIU/L in the third trimester. Her TSH levels should be rechecked with each trimester and then her meds titrated to keep her TSH in the normal range for pregnancy. Concurrent use of iron or calcium and her thyroid meds may result in decreased effectiveness of her meds. Follow up recommended:   2-week ultrasound for her last transvaginal scan and missed anatomy.   32-week growth scan  34-week consult with pediatric orthopedics which Mehdi can schedule on her own. The referral was ordered. Pre visit time reviewing her records  10 minutes  Face to face time 20 minutes  Post visit time on documentation of note, updating her problem list, adding orders and prescriptions 20 minutes. Procedures that were completed today were charged separately. The level of decision making was low level complexity.     Chidi Sesay MD

## 2023-08-07 ENCOUNTER — ROUTINE PRENATAL (OUTPATIENT)
Dept: OBGYN CLINIC | Facility: CLINIC | Age: 38
End: 2023-08-07

## 2023-08-07 VITALS — SYSTOLIC BLOOD PRESSURE: 120 MMHG | WEIGHT: 211 LBS | DIASTOLIC BLOOD PRESSURE: 70 MMHG | BODY MASS INDEX: 34.06 KG/M2

## 2023-08-07 DIAGNOSIS — E03.8 OTHER SPECIFIED HYPOTHYROIDISM: ICD-10-CM

## 2023-08-07 DIAGNOSIS — O09.899 HISTORY OF PREMATURE DELIVERY, CURRENTLY PREGNANT: ICD-10-CM

## 2023-08-07 DIAGNOSIS — Z3A.22 22 WEEKS GESTATION OF PREGNANCY: Primary | ICD-10-CM

## 2023-08-07 DIAGNOSIS — O35.HXX0 MATERNAL CARE FOR OTHER (SUSPECTED) FETAL ABNORMALITY AND DAMAGE, FETAL LOWER EXTREMITIES ANOMALIES, NOT APPLICABLE OR UNSPECIFIED: ICD-10-CM

## 2023-08-07 DIAGNOSIS — Z82.79 FAMILY HISTORY OF SEX CHROMOSOME ANEUPLOIDY: ICD-10-CM

## 2023-08-07 DIAGNOSIS — O09.522 MULTIGRAVIDA OF ADVANCED MATERNAL AGE IN SECOND TRIMESTER: ICD-10-CM

## 2023-08-07 RX ORDER — LEVOTHYROXINE SODIUM 137 UG/1
TABLET ORAL
COMMUNITY
Start: 2023-08-03 | End: 2023-08-07

## 2023-08-07 NOTE — ASSESSMENT & PLAN NOTE
2017- Hx of PPROM at 32 wks;  at 33 wks complicated by placental abruption- at [facility]  Normal CL on  23. Has not attended CL screening thus far. She is utilizing vaginal progesterone suppository. Reviewed recommendation for CL screen and missed anatomy in 2 weeks and f/u growth @ 32 weeks. She is agreeable to call to schedule this.

## 2023-08-07 NOTE — PROGRESS NOTES
Problem List Items Addressed This Visit        Endocrine    Hypothyroidism     Currently maintained on levothyroxine 125 mcg  Repeat TSH on 23 normal 0.851            Other    22 weeks gestation of pregnancy - Primary     Mehdi  is a 44 y.o. [id number] @22w0d who presents for routine prenatal visit. Denies OB complaints. NIPT - pending   AFP - neg   Level II - completed 23   TWG 26 lbs. Good fetal movement. Denies contractions, cramping, leakage of fluid or vaginal bleeding. Reviewed PTL precautions and reasons to call. Multigravida of advanced maternal age in second trimester     Growth US @ 32 weeks. Family history of sex chromosome aneuploidy    History of premature delivery, currently pregnant     2017- Hx of PPROM at 27 wks;  at 35 wks complicated by placental abruption- at [facility]  Normal CL on US 23. Has not attended CL screening thus far. She is utilizing vaginal progesterone suppository. Reviewed recommendation for CL screen and missed anatomy in 2 weeks and f/u growth @ 32 weeks. She is agreeable to call to schedule this. Maternal care for other (suspected) fetal abnormality and damage, fetal lower extremities anomalies, not applicable or unspecified     Bilateral talipes. For ped ortho consult @ 34 weeks to discuss  plans of care.

## 2023-08-07 NOTE — PROGRESS NOTES
Patient here for prenatal visit. She denies any complaints. She is feeling movement. AFP completed. US 8/1/23; gender is a boy! She plans on breastfeeding.

## 2023-08-07 NOTE — ASSESSMENT & PLAN NOTE
[facility]  is a 44 y.o. [id number] @22w0d who presents for routine prenatal visit. Denies OB complaints. NIPT - pending   AFP - neg   Level II - completed 8/1/23   TWG 26 lbs. Good fetal movement. Denies contractions, cramping, leakage of fluid or vaginal bleeding. Reviewed PTL precautions and reasons to call.

## 2023-08-10 ENCOUNTER — TELEPHONE (OUTPATIENT)
Dept: PERINATAL CARE | Facility: CLINIC | Age: 38
End: 2023-08-10

## 2023-08-10 NOTE — TELEPHONE ENCOUNTER
----- Message from Deysi Alvarado MD sent at 8/10/2023  8:46 AM EDT -----  Your Cell free DNA screening returned as normal. If you are interested in knowing what the baby's sex is, than you will need to open the lab result to see it. Your obstetrician at your next OB visit should offer screening for spina bifida that can be completed between 12 and 18 weeks and utilizes the blood test called MSAFP. This lab is to see if your baby is at increased risk to have spinal defect.   Mary Fountain MD

## 2023-08-10 NOTE — TELEPHONE ENCOUNTER
Spoke with pt and provided her with the results of the NIPS, gender NOT provided. Pt receptive and declines questions at this time. PT already completed the MSAFP screening.

## 2023-08-17 DIAGNOSIS — E03.9 HYPOTHYROIDISM, UNSPECIFIED TYPE: ICD-10-CM

## 2023-08-17 RX ORDER — LEVOTHYROXINE SODIUM 0.12 MG/1
125 TABLET ORAL DAILY
Qty: 90 TABLET | Refills: 1 | Status: SHIPPED | OUTPATIENT
Start: 2023-08-17

## 2023-08-18 PROBLEM — E03.9 HYPOTHYROIDISM AFFECTING PREGNANCY: Status: ACTIVE | Noted: 2023-08-18

## 2023-08-18 PROBLEM — O99.280 HYPOTHYROIDISM AFFECTING PREGNANCY: Status: ACTIVE | Noted: 2023-08-18

## 2023-08-18 RX ORDER — LEVOTHYROXINE SODIUM 0.12 MG/1
125 TABLET ORAL DAILY
Qty: 90 TABLET | Refills: 0 | OUTPATIENT
Start: 2023-08-18

## 2023-09-06 ENCOUNTER — ROUTINE PRENATAL (OUTPATIENT)
Dept: OBGYN CLINIC | Facility: CLINIC | Age: 38
End: 2023-09-06
Payer: COMMERCIAL

## 2023-09-06 VITALS — BODY MASS INDEX: 35.02 KG/M2 | WEIGHT: 217 LBS | SYSTOLIC BLOOD PRESSURE: 138 MMHG | DIASTOLIC BLOOD PRESSURE: 76 MMHG

## 2023-09-06 DIAGNOSIS — O09.899 HISTORY OF PREMATURE DELIVERY, CURRENTLY PREGNANT: Primary | ICD-10-CM

## 2023-09-06 DIAGNOSIS — Z3A.26 26 WEEKS GESTATION OF PREGNANCY: ICD-10-CM

## 2023-09-06 DIAGNOSIS — O09.522 MULTIGRAVIDA OF ADVANCED MATERNAL AGE IN SECOND TRIMESTER: ICD-10-CM

## 2023-09-06 DIAGNOSIS — O99.282 HYPOTHYROIDISM AFFECTING PREGNANCY IN SECOND TRIMESTER: ICD-10-CM

## 2023-09-06 DIAGNOSIS — E03.8 OTHER SPECIFIED HYPOTHYROIDISM: ICD-10-CM

## 2023-09-06 DIAGNOSIS — E03.9 HYPOTHYROIDISM AFFECTING PREGNANCY IN SECOND TRIMESTER: ICD-10-CM

## 2023-09-06 DIAGNOSIS — O35.HXX0 MATERNAL CARE FOR OTHER (SUSPECTED) FETAL ABNORMALITY AND DAMAGE, FETAL LOWER EXTREMITIES ANOMALIES, NOT APPLICABLE OR UNSPECIFIED: ICD-10-CM

## 2023-09-06 DIAGNOSIS — Z3A.28 28 WEEKS GESTATION OF PREGNANCY: ICD-10-CM

## 2023-09-06 LAB
SL AMB  POCT GLUCOSE, UA: NORMAL
SL AMB POCT URINE PROTEIN: NORMAL

## 2023-09-06 PROCEDURE — 81002 URINALYSIS NONAUTO W/O SCOPE: CPT | Performed by: PHYSICIAN ASSISTANT

## 2023-09-06 PROCEDURE — PNV: Performed by: PHYSICIAN ASSISTANT

## 2023-09-06 NOTE — PROGRESS NOTES
Prenatal Visit   26w2d    PN1 completed  Nuchal completed   AFP completed  Level 2 completed  28 week labs given today     Denies LOF, VB, or CTX.   Pt has +FM  Patients urine is   Patient has no questions/concerns today

## 2023-09-07 PROBLEM — Z3A.26 26 WEEKS GESTATION OF PREGNANCY: Status: ACTIVE | Noted: 2023-05-22

## 2023-09-07 NOTE — ASSESSMENT & PLAN NOTE
2017- Hx of PPROM at 32 wks complicated by placental abruption  Normal CL at anatomy scan. She never scheduled 2 wk f/u CL and missed anatomy.  Encouraged pt to call MFM to schedule this  Utilizing vaginal progesterone  Has 32 wk US scheduled

## 2023-09-07 NOTE — ASSESSMENT & PLAN NOTE
Bilateral talipes. For ped ortho consult @ 34 weeks to discuss  plans. Pt is declining this consult. Would prefer to address after baby is born.  Reviewed the benefits of reviewing possible expectations/outcomes/plans of care with specialty team. She will consider

## 2023-09-07 NOTE — PROGRESS NOTES
Hypothyroidism affecting pregnancy  Currently maintained on levothyroxine 125 mcg  Repeat TSH on 23 normal 0.851  TSH orders placed to be repeated with 28 wk labs    Multigravida of advanced maternal age in second trimester  Low risk NIPT  Low risk AFP    Maternal care for other (suspected) fetal abnormality and damage, fetal lower extremities anomalies, not applicable or unspecified  Bilateral talipes. For ped ortho consult @ 34 weeks to discuss  plans. Pt is declining this consult. Would prefer to address after baby is born. Reviewed the benefits of reviewing possible expectations/outcomes/plans of care with specialty team. She will consider    History of premature delivery, currently pregnant  2017- Hx of PPROM at 32 wks complicated by placental abruption  Normal CL at anatomy scan. She never scheduled 2 wk f/u CL and missed anatomy. Encouraged pt to call MFM to schedule this  Utilizing vaginal progesterone  Has 32 wk US scheduled    26 weeks gestation of pregnancy  Mehdi  is a 43 y.o. [id number] @26w3d who presents for routine prenatal visit. Denies OB complaints. NIPT- low risk  MASFP- low risk  Level II - complete; missed anatomy and suspected b/l talipes; normal CL; she was instructed to schedule a 2 wk f/w scan for CL and missed anatomy but never did this. Advised to call MFM to schedule. She does have a 32 wks scan scheduled  28 week lab orders placed; reviewed test timing    Good fetal movement. Denies contractions, cramping, leakage of fluid or vaginal bleeding. Reviewed PTL precautions and reasons to call.

## 2023-09-07 NOTE — ASSESSMENT & PLAN NOTE
Unique Pittman  is a 43 y.o. [id number] @26w3d who presents for routine prenatal visit. Denies OB complaints. NIPT- low risk  MASFP- low risk  Level II - complete; missed anatomy and suspected b/l talipes; normal CL; she was instructed to schedule a 2 wk f/w scan for CL and missed anatomy but never did this. Advised to call MFM to schedule. She does have a 32 wks scan scheduled  28 week lab orders placed; reviewed test timing    Good fetal movement. Denies contractions, cramping, leakage of fluid or vaginal bleeding. Reviewed PTL precautions and reasons to call.

## 2023-09-07 NOTE — ASSESSMENT & PLAN NOTE
Currently maintained on levothyroxine 125 mcg  Repeat TSH on 7/17/23 normal 0.851  TSH orders placed to be repeated with 28 wk labs

## 2023-09-08 ENCOUNTER — APPOINTMENT (OUTPATIENT)
Dept: LAB | Facility: HOSPITAL | Age: 38
End: 2023-09-08
Payer: COMMERCIAL

## 2023-09-08 DIAGNOSIS — E03.8 OTHER SPECIFIED HYPOTHYROIDISM: ICD-10-CM

## 2023-09-08 DIAGNOSIS — Z3A.28 28 WEEKS GESTATION OF PREGNANCY: ICD-10-CM

## 2023-09-08 LAB
BASOPHILS # BLD AUTO: 0.04 THOUSANDS/ÂΜL (ref 0–0.1)
BASOPHILS NFR BLD AUTO: 0 % (ref 0–1)
EOSINOPHIL # BLD AUTO: 0.07 THOUSAND/ÂΜL (ref 0–0.61)
EOSINOPHIL NFR BLD AUTO: 1 % (ref 0–6)
ERYTHROCYTE [DISTWIDTH] IN BLOOD BY AUTOMATED COUNT: 20.2 % (ref 11.6–15.1)
GLUCOSE 1H P 50 G GLC PO SERPL-MCNC: 128 MG/DL (ref 40–134)
HCT VFR BLD AUTO: 39 % (ref 34.8–46.1)
HGB BLD-MCNC: 12.1 G/DL (ref 11.5–15.4)
IMM GRANULOCYTES # BLD AUTO: 0.14 THOUSAND/UL (ref 0–0.2)
IMM GRANULOCYTES NFR BLD AUTO: 1 % (ref 0–2)
LYMPHOCYTES # BLD AUTO: 1.36 THOUSANDS/ÂΜL (ref 0.6–4.47)
LYMPHOCYTES NFR BLD AUTO: 13 % (ref 14–44)
MCH RBC QN AUTO: 27.6 PG (ref 26.8–34.3)
MCHC RBC AUTO-ENTMCNC: 31 G/DL (ref 31.4–37.4)
MCV RBC AUTO: 89 FL (ref 82–98)
MONOCYTES # BLD AUTO: 0.42 THOUSAND/ÂΜL (ref 0.17–1.22)
MONOCYTES NFR BLD AUTO: 4 % (ref 4–12)
NEUTROPHILS # BLD AUTO: 8.84 THOUSANDS/ÂΜL (ref 1.85–7.62)
NEUTS SEG NFR BLD AUTO: 81 % (ref 43–75)
NRBC BLD AUTO-RTO: 0 /100 WBCS
PLATELET # BLD AUTO: 236 THOUSANDS/UL (ref 149–390)
PMV BLD AUTO: 10.5 FL (ref 8.9–12.7)
RBC # BLD AUTO: 4.39 MILLION/UL (ref 3.81–5.12)
TREPONEMA PALLIDUM IGG+IGM AB [PRESENCE] IN SERUM OR PLASMA BY IMMUNOASSAY: NORMAL
TSH SERPL DL<=0.05 MIU/L-ACNC: 0.65 UIU/ML (ref 0.45–4.5)
WBC # BLD AUTO: 10.87 THOUSAND/UL (ref 4.31–10.16)

## 2023-09-08 PROCEDURE — 82950 GLUCOSE TEST: CPT

## 2023-09-08 PROCEDURE — 84443 ASSAY THYROID STIM HORMONE: CPT

## 2023-09-08 PROCEDURE — 85025 COMPLETE CBC W/AUTO DIFF WBC: CPT

## 2023-09-08 PROCEDURE — 36415 COLL VENOUS BLD VENIPUNCTURE: CPT

## 2023-09-08 PROCEDURE — 86780 TREPONEMA PALLIDUM: CPT

## 2023-09-18 ENCOUNTER — ROUTINE PRENATAL (OUTPATIENT)
Dept: OBGYN CLINIC | Facility: CLINIC | Age: 38
End: 2023-09-18

## 2023-09-18 VITALS — SYSTOLIC BLOOD PRESSURE: 126 MMHG | DIASTOLIC BLOOD PRESSURE: 66 MMHG

## 2023-09-18 DIAGNOSIS — O09.899 HISTORY OF PREMATURE DELIVERY, CURRENTLY PREGNANT: ICD-10-CM

## 2023-09-18 DIAGNOSIS — E03.9 HYPOTHYROIDISM AFFECTING PREGNANCY IN THIRD TRIMESTER: ICD-10-CM

## 2023-09-18 DIAGNOSIS — O09.522 MULTIGRAVIDA OF ADVANCED MATERNAL AGE IN SECOND TRIMESTER: ICD-10-CM

## 2023-09-18 DIAGNOSIS — Z3A.28 28 WEEKS GESTATION OF PREGNANCY: Primary | ICD-10-CM

## 2023-09-18 DIAGNOSIS — Z23 NEED FOR TDAP VACCINATION: ICD-10-CM

## 2023-09-18 DIAGNOSIS — O35.HXX0 MATERNAL CARE FOR OTHER (SUSPECTED) FETAL ABNORMALITY AND DAMAGE, FETAL LOWER EXTREMITIES ANOMALIES, NOT APPLICABLE OR UNSPECIFIED: ICD-10-CM

## 2023-09-18 DIAGNOSIS — Z34.83 PRENATAL CARE, SUBSEQUENT PREGNANCY, THIRD TRIMESTER: ICD-10-CM

## 2023-09-18 DIAGNOSIS — O99.283 HYPOTHYROIDISM AFFECTING PREGNANCY IN THIRD TRIMESTER: ICD-10-CM

## 2023-09-18 RX ORDER — ASPIRIN 81 MG/1
162 TABLET, CHEWABLE ORAL DAILY
COMMUNITY

## 2023-09-18 NOTE — PROGRESS NOTES
Patient here for prenatal visit. She denies any complaints. Good fetal movement. It's a boy! Yellow folder given and reviewed. Tdap administered in left deltoid today; VIS given. Breast pump order placed today. 28 week labs completed. She was not able to provide urine specimen. Delivery consent signed.

## 2023-09-18 NOTE — ASSESSMENT & PLAN NOTE
Patty Cyr  is a 43 y.o. [id number] @28w0d who presents for routine prenatal visit. 28 wk labs - normal   Growth scan - 10/17/23   TDAP - given today  Plans to breastfeed. Pump - ordered   Yellow packet given and reviewed. Delivery consent signed. Reports good fetal movement. Denies LOF, vaginal bleeding, regular uterine contractions, cramping, headaches or visual changes. Reviewed PTL precautions and FKC. The patient was counseled about the labor process. She was counseled regarding potential reasons that she may need a  section including arrest of dilation/descent, non-reassuring fetal status, or worsening maternal status. She was counseled on the risks of  including bleeding, infection, and injury to surrounding structures including the bowel and bladder. She was counseled that there are medical and surgical methods to manage excessive postpartum bleeding. She was counseled that in the event of excessive blood loss, she may require blood transfusion which includes a small risk of blood borne diseases such as hepatitis and HIV. The patient is OK with receiving a blood transfusion if necessary. The patient had an opportunity to ask questions and signed consent. She was counseled about the possible need for operative delivery using the vacuum or forceps and the indications for doing so. She was counseled that there is a small risk of  complications including intracranial hemorrhage, lacerations, nerve damage or fracture as well as the increased risk for more severe maternal laceration. The patient signed consent.

## 2023-09-19 LAB
DME PARACHUTE DELIVERY DATE REQUESTED: NORMAL
DME PARACHUTE ITEM DESCRIPTION: NORMAL
DME PARACHUTE ORDER STATUS: NORMAL
DME PARACHUTE SUPPLIER NAME: NORMAL
DME PARACHUTE SUPPLIER PHONE: NORMAL

## 2023-09-19 NOTE — ASSESSMENT & PLAN NOTE
PPROM at 32 weeks complicated by placental abruption  Normal CL at anatomy scan. Has not scheduled f/u CL and missed anatomy scan. Encouraged to call M to schedule this. Next growth US scheduled for 32 weeks   Continues vaginal progesterone.   PTL precautions reviewed

## 2023-09-19 NOTE — PROGRESS NOTES
Problem List Items Addressed This Visit        Endocrine    Hypothyroidism affecting pregnancy     Third trimester TSH order provided   Continues levothyroxine 125 mcg          Relevant Orders    TSH, 3rd generation with Free T4 reflex       Other    28 weeks gestation of pregnancy     Mehdi  is a 43 y.o. [id number] @28w0d who presents for routine prenatal visit. 28 wk labs - normal   Growth scan - 10/17/23   TDAP - given today  Plans to breastfeed. Pump - ordered   Yellow packet given and reviewed. Delivery consent signed. Reports good fetal movement. Denies LOF, vaginal bleeding, regular uterine contractions, cramping, headaches or visual changes. Reviewed PTL precautions and FKC. The patient was counseled about the labor process. She was counseled regarding potential reasons that she may need a  section including arrest of dilation/descent, non-reassuring fetal status, or worsening maternal status. She was counseled on the risks of  including bleeding, infection, and injury to surrounding structures including the bowel and bladder. She was counseled that there are medical and surgical methods to manage excessive postpartum bleeding. She was counseled that in the event of excessive blood loss, she may require blood transfusion which includes a small risk of blood borne diseases such as hepatitis and HIV. The patient is OK with receiving a blood transfusion if necessary. The patient had an opportunity to ask questions and signed consent. She was counseled about the possible need for operative delivery using the vacuum or forceps and the indications for doing so. She was counseled that there is a small risk of  complications including intracranial hemorrhage, lacerations, nerve damage or fracture as well as the increased risk for more severe maternal laceration. The patient signed consent.           Multigravida of advanced maternal age in second trimester Continues LDASA. History of premature delivery, currently pregnant     PPROM at 28 weeks complicated by placental abruption  Normal CL at anatomy scan. Has not scheduled f/u CL and missed anatomy scan. Encouraged to call M to schedule this. Next growth US scheduled for 32 weeks   Continues vaginal progesterone.   PTL precautions reviewed          Maternal care for other (suspected) fetal abnormality and damage, fetal lower extremities anomalies, not applicable or unspecified     BL talipes  Ped ortho consult @ 34 weeks recommended          Encounter for care and examination of lactating mother    Relevant Orders    Breast pump   Other Visit Diagnoses     Prenatal care, subsequent pregnancy, third trimester    -  Primary    Need for Tdap vaccination        Relevant Orders    TDAP VACCINE GREATER THAN OR EQUAL TO 8YO IM (Completed)

## 2023-10-04 ENCOUNTER — ROUTINE PRENATAL (OUTPATIENT)
Dept: OBGYN CLINIC | Facility: CLINIC | Age: 38
End: 2023-10-04
Payer: COMMERCIAL

## 2023-10-04 VITALS — DIASTOLIC BLOOD PRESSURE: 72 MMHG | BODY MASS INDEX: 36.41 KG/M2 | WEIGHT: 225.6 LBS | SYSTOLIC BLOOD PRESSURE: 128 MMHG

## 2023-10-04 DIAGNOSIS — O35.HXX0 MATERNAL CARE FOR OTHER (SUSPECTED) FETAL ABNORMALITY AND DAMAGE, FETAL LOWER EXTREMITIES ANOMALIES, NOT APPLICABLE OR UNSPECIFIED: ICD-10-CM

## 2023-10-04 DIAGNOSIS — Z34.83 PRENATAL CARE, SUBSEQUENT PREGNANCY, THIRD TRIMESTER: ICD-10-CM

## 2023-10-04 DIAGNOSIS — O09.899 HISTORY OF PREMATURE DELIVERY, CURRENTLY PREGNANT: ICD-10-CM

## 2023-10-04 DIAGNOSIS — E03.9 HYPOTHYROIDISM AFFECTING PREGNANCY IN THIRD TRIMESTER: ICD-10-CM

## 2023-10-04 DIAGNOSIS — O09.522 MULTIGRAVIDA OF ADVANCED MATERNAL AGE IN SECOND TRIMESTER: ICD-10-CM

## 2023-10-04 DIAGNOSIS — O99.283 HYPOTHYROIDISM AFFECTING PREGNANCY IN THIRD TRIMESTER: ICD-10-CM

## 2023-10-04 DIAGNOSIS — Z82.79 FAMILY HISTORY OF SEX CHROMOSOME ANEUPLOIDY: ICD-10-CM

## 2023-10-04 DIAGNOSIS — Z3A.30 30 WEEKS GESTATION OF PREGNANCY: Primary | ICD-10-CM

## 2023-10-04 LAB
SL AMB  POCT GLUCOSE, UA: NORMAL
SL AMB POCT URINE PROTEIN: NORMAL

## 2023-10-04 PROCEDURE — PNV: Performed by: PHYSICIAN ASSISTANT

## 2023-10-04 PROCEDURE — 81002 URINALYSIS NONAUTO W/O SCOPE: CPT | Performed by: PHYSICIAN ASSISTANT

## 2023-10-04 NOTE — ASSESSMENT & PLAN NOTE
Ivis Delgado  is a 43 y.o. [id number] @30w2d who presents for routine prenatal visit. Reports varicose veins of lower legs. Encouraged compression stockings and elevation when possible. 28 wk labs - normal   Delivery consent - previously signed  Flu vaccine - had yesterday at outside facility  Tdap previously received   Growth scan scheduled for 32 weeks   Plans to breastfeed. Pump - has order   Ped - established    Has yellow packet. TWG 40 lbs. Goal 15-25. Encouraged walking and healthy diet. Reports good fetal movement. Denies LOF, vaginal bleeding, regular uterine contractions, cramping, headaches or visual changes. Reviewed PTL precautions and FKC.

## 2023-10-04 NOTE — PROGRESS NOTES
Problem List Items Addressed This Visit        Endocrine    Hypothyroidism affecting pregnancy     Has order for third trimester screen. Other    30 weeks gestation of pregnancy     Mehdi  is a 43 y.o. [id number] @30w2d who presents for routine prenatal visit. Reports varicose veins of lower legs. Encouraged compression stockings and elevation when possible. 28 wk labs - normal   Delivery consent - previously signed  Flu vaccine - had yesterday at outside facility  Tdap previously received   Growth scan scheduled for 32 weeks   Plans to breastfeed. Pump - has order   Ped - established    Has yellow packet. TWG 40 lbs. Goal 15-25. Encouraged walking and healthy diet. Reports good fetal movement. Denies LOF, vaginal bleeding, regular uterine contractions, cramping, headaches or visual changes. Reviewed PTL precautions and FKC. Multigravida of advanced maternal age in second trimester    Family history of sex chromosome aneuploidy    History of premature delivery, currently pregnant     Continues vaginal progesterone. Next US scheduled 10/17/23 @ 32 weeks          Maternal care for other (suspected) fetal abnormality and damage, fetal lower extremities anomalies, not applicable or unspecified     B/L talipes.  Ped ortho consult recommended @ 34 weeks         Other Visit Diagnoses     Prenatal care, subsequent pregnancy, third trimester    -  Primary    Relevant Orders    POCT urine dip (Completed)

## 2023-10-17 ENCOUNTER — ULTRASOUND (OUTPATIENT)
Age: 38
End: 2023-10-17
Payer: COMMERCIAL

## 2023-10-17 VITALS
HEIGHT: 66 IN | BODY MASS INDEX: 37.19 KG/M2 | HEART RATE: 97 BPM | SYSTOLIC BLOOD PRESSURE: 138 MMHG | DIASTOLIC BLOOD PRESSURE: 82 MMHG | WEIGHT: 231.4 LBS

## 2023-10-17 DIAGNOSIS — O35.HXX0 MATERNAL CARE FOR OTHER (SUSPECTED) FETAL ABNORMALITY AND DAMAGE, FETAL LOWER EXTREMITIES ANOMALIES, NOT APPLICABLE OR UNSPECIFIED: ICD-10-CM

## 2023-10-17 DIAGNOSIS — O09.522 MULTIGRAVIDA OF ADVANCED MATERNAL AGE IN SECOND TRIMESTER: ICD-10-CM

## 2023-10-17 DIAGNOSIS — Z3A.32 32 WEEKS GESTATION OF PREGNANCY: Primary | ICD-10-CM

## 2023-10-17 DIAGNOSIS — Z36.89 ENCOUNTER FOR ULTRASOUND TO CHECK FETAL GROWTH: ICD-10-CM

## 2023-10-17 PROCEDURE — 76816 OB US FOLLOW-UP PER FETUS: CPT | Performed by: OBSTETRICS & GYNECOLOGY

## 2023-10-17 PROCEDURE — 99213 OFFICE O/P EST LOW 20 MIN: CPT | Performed by: OBSTETRICS & GYNECOLOGY

## 2023-10-17 NOTE — PROGRESS NOTES
[address] [place] [address] Dr: Kenneth Hagendevan Mike was seen today for fetal growth and followup missed anatomy ultrasound. See ultrasound report under "OB Procedures" tab. The time spent on this established patient on the encounter date included 4 minutes previsit service time reviewing records and precharting, 5 minutes face-to-face service time counseling regarding results and coordinating care, and  5 minutes charting, totalling 14 minutes.   Please don't hesitate to contact our office with any concerns or questions.  -Jackson Shannon MD

## 2023-10-17 NOTE — PATIENT INSTRUCTIONS
Thank you for choosing us for your  care today. If you have any questions about your ultrasound or care, please do not hesitate to contact us or your primary obstetrician. Some general instructions for your pregnancy are:    Protect against coronavirus: get vaccinated - pregnant women are increased risk of severe COVID. Notify your primary care doctor if you have any symptoms. Exercise: Aim for 22 minutes per day (150 minutes per week) of regular exercise. Walking is great! Nutrition: aim for calcium-rich and iron-rich foods as well as healthy sources of protein. Learn about Preeclampsia: preeclampsia is a common, serious high blood pressure complication in pregnancy. A blood pressure of 392YPBF (systolic or top number) or 37HQKE (diastolic or bottom number) is not normal and needs evaluation by your doctor. Aspirin is sometimes prescribed in early pregnancy to prevent preeclampsia in women with risk factors - ask your obstetrician if you should be on this medication. If you smoke, try to reduce how many cigarettes you smoke or try to quit completely. Do not vape. Other warning signs to watch out for in pregnancy or postpartum: chest pain, obstructed breathing or shortness of breath, seizures, thoughts of hurting yourself or your baby, bleeding, a painful or swollen leg, fever, or headache (see [facility] POST-BIRTH Warning Signs campaign). If these happen call 911. Itching is also not normal in pregnancy and if you experience this, especially over your hands and feet, potentially worse at night, notify your doctors.

## 2023-10-18 ENCOUNTER — TELEPHONE (OUTPATIENT)
Dept: OBGYN CLINIC | Facility: HOSPITAL | Age: 38
End: 2023-10-18

## 2023-10-18 NOTE — TELEPHONE ENCOUNTER
Caller: Patient    Doctor:     Reason for call: Patient is calling to schedule an appt for Z3A.21 (ICD-10-CM) - 21 weeks gestation of pregnancy  O35. UVE0 (ICD-10-CM) - Maternal care for other (suspected) fetal abnormality and damage, fetal lower extremities anomalies, not applicable or unspecified     Call back#: [phone]

## 2023-10-19 ENCOUNTER — ROUTINE PRENATAL (OUTPATIENT)
Dept: OBGYN CLINIC | Facility: CLINIC | Age: 38
End: 2023-10-19

## 2023-10-19 VITALS — WEIGHT: 230.4 LBS | DIASTOLIC BLOOD PRESSURE: 72 MMHG | BODY MASS INDEX: 37.19 KG/M2 | SYSTOLIC BLOOD PRESSURE: 124 MMHG

## 2023-10-19 DIAGNOSIS — E03.9 HYPOTHYROIDISM AFFECTING PREGNANCY IN THIRD TRIMESTER: ICD-10-CM

## 2023-10-19 DIAGNOSIS — O09.522 MULTIGRAVIDA OF ADVANCED MATERNAL AGE IN SECOND TRIMESTER: ICD-10-CM

## 2023-10-19 DIAGNOSIS — Z3A.32 32 WEEKS GESTATION OF PREGNANCY: Primary | ICD-10-CM

## 2023-10-19 DIAGNOSIS — O35.HXX0 MATERNAL CARE FOR OTHER (SUSPECTED) FETAL ABNORMALITY AND DAMAGE, FETAL LOWER EXTREMITIES ANOMALIES, NOT APPLICABLE OR UNSPECIFIED: ICD-10-CM

## 2023-10-19 DIAGNOSIS — O99.283 HYPOTHYROIDISM AFFECTING PREGNANCY IN THIRD TRIMESTER: ICD-10-CM

## 2023-10-19 DIAGNOSIS — Z34.83 PRENATAL CARE, SUBSEQUENT PREGNANCY, THIRD TRIMESTER: ICD-10-CM

## 2023-10-19 DIAGNOSIS — O09.899 HISTORY OF PREMATURE DELIVERY, CURRENTLY PREGNANT: ICD-10-CM

## 2023-10-19 LAB
SL AMB  POCT GLUCOSE, UA: NORMAL
SL AMB POCT URINE PROTEIN: NORMAL

## 2023-10-19 NOTE — PROGRESS NOTES
Patient here for prenatal visit. She denies any complaints. Good fetal movement. Up to date with tdap & flu vaccines. She has to call [facility] to decide which pump she will choose. Urine neg for protein and glucose.

## 2023-10-19 NOTE — ASSESSMENT & PLAN NOTE
Unique Pittman  is a 43 y.o. [id number] @32w3d who presents for routine prenatal visit. 28 wk labs - normal.   Growth scan completed 10/17/23. F/u 11/16/23. S/p tdap and flu   Plans to breastfeed. Pump - Has to order. BF class encouraged   Perineal massage - encouraged   Delivery consent previously signed   Has yellow packet. Reports good fetal movement. Denies LOF, vaginal bleeding, regular uterine contractions, cramping, headaches or visual changes. Reviewed PTL/Labor precautions and FKC.

## 2023-10-19 NOTE — PROGRESS NOTES
Problem List Items Addressed This Visit       32 weeks gestation of pregnancy - Primary     Marnie Morrison  is a 43 y.o. [id number] @32w3d who presents for routine prenatal visit. 28 wk labs - normal.   Growth scan completed 10/17/23. F/u 11/16/23. S/p tdap and flu   Plans to breastfeed. Pump - Has to order. BF class encouraged   Perineal massage - encouraged   Delivery consent previously signed   Has yellow packet. Reports good fetal movement. Denies LOF, vaginal bleeding, regular uterine contractions, cramping, headaches or visual changes. Reviewed PTL/Labor precautions and FKC. Multigravida of advanced maternal age in second trimester     Continues LDASA. History of premature delivery, currently pregnant     Continues Vaginal progesterone. F/u US 11/16. Maternal care for other (suspected) fetal abnormality and damage, fetal lower extremities anomalies, not applicable or unspecified     B/L Talipes -- has contacted ortho. Telephone encounter reviewed. Encouraged to call again to schedule with Dr. Roscoe Chapin. Hypothyroidism affecting pregnancy     Encouraged to obtain repeat TSH. Has order.            Other Visit Diagnoses       Prenatal care, subsequent pregnancy, third trimester        Relevant Orders    POCT urine dip (Completed)

## 2023-10-19 NOTE — ASSESSMENT & PLAN NOTE
MARCIN Patel -- has contacted ortho. Telephone encounter reviewed. Encouraged to call again to schedule with Dr. Jose Palacios.

## 2023-11-01 ENCOUNTER — ROUTINE PRENATAL (OUTPATIENT)
Dept: OBGYN CLINIC | Facility: CLINIC | Age: 38
End: 2023-11-01
Payer: COMMERCIAL

## 2023-11-01 VITALS — DIASTOLIC BLOOD PRESSURE: 76 MMHG | BODY MASS INDEX: 37.61 KG/M2 | SYSTOLIC BLOOD PRESSURE: 130 MMHG | WEIGHT: 233 LBS

## 2023-11-01 DIAGNOSIS — E03.9 HYPOTHYROIDISM AFFECTING PREGNANCY IN THIRD TRIMESTER: ICD-10-CM

## 2023-11-01 DIAGNOSIS — O09.899 HISTORY OF PREMATURE DELIVERY, CURRENTLY PREGNANT: ICD-10-CM

## 2023-11-01 DIAGNOSIS — O09.522 MULTIGRAVIDA OF ADVANCED MATERNAL AGE IN SECOND TRIMESTER: ICD-10-CM

## 2023-11-01 DIAGNOSIS — Z34.83 PRENATAL CARE, SUBSEQUENT PREGNANCY, THIRD TRIMESTER: Primary | ICD-10-CM

## 2023-11-01 DIAGNOSIS — O35.HXX0 MATERNAL CARE FOR OTHER (SUSPECTED) FETAL ABNORMALITY AND DAMAGE, FETAL LOWER EXTREMITIES ANOMALIES, NOT APPLICABLE OR UNSPECIFIED: ICD-10-CM

## 2023-11-01 DIAGNOSIS — O99.283 HYPOTHYROIDISM AFFECTING PREGNANCY IN THIRD TRIMESTER: ICD-10-CM

## 2023-11-01 DIAGNOSIS — Z3A.34 34 WEEKS GESTATION OF PREGNANCY: ICD-10-CM

## 2023-11-01 LAB
SL AMB  POCT GLUCOSE, UA: NORMAL
SL AMB POCT URINE PROTEIN: NORMAL

## 2023-11-01 PROCEDURE — PNV: Performed by: PHYSICIAN ASSISTANT

## 2023-11-01 PROCEDURE — 81002 URINALYSIS NONAUTO W/O SCOPE: CPT | Performed by: PHYSICIAN ASSISTANT

## 2023-11-01 NOTE — PROGRESS NOTES
Prenatal Visit   34w2d    PN1 completed  Nuchal completed   AFP completed  Level 2 completed  28 week labs completed   Consents signed  Breast pump ordered   Tdap UTD  Flu vaccine UTD    Denies LOF, VB, or CTX.   Pt has cramping  Pt has +FM  Patients urine is   Has questions about contractions  Patient has no further questions/concerns today

## 2023-11-02 PROBLEM — Z3A.34 34 WEEKS GESTATION OF PREGNANCY: Status: ACTIVE | Noted: 2023-05-22

## 2023-11-02 NOTE — ASSESSMENT & PLAN NOTE
Johnnie Greene  is a 43 y.o. [id number] @34w3d who presents for routine prenatal visit. 28 wk labs - normal labs  Growth scan- EFW42% at 32 wks; f/u growth scheduled  TDAP and flu up to date  RSV vaccine discussed  GBS next visit  Delivery consents signed previously    Reports good fetal movement. Denies LOF, vaginal bleeding, regular uterine contractions, cramping, headaches or visual changes. Reviewed PTL/Labor precautions and FKC.

## 2023-11-02 NOTE — PROGRESS NOTES
34 weeks gestation of pregnancy  Mehdi  is a 43 y.o. [id number] @34w3d who presents for routine prenatal visit. 28 wk labs - normal labs  Growth scan- EFW42% at 32 wks; f/u growth scheduled  TDAP and flu up to date  RSV vaccine discussed  GBS next visit  Delivery consents signed previously    Reports good fetal movement. Denies LOF, vaginal bleeding, regular uterine contractions, cramping, headaches or visual changes. Reviewed PTL/Labor precautions and FKC.         Maternal care for other (suspected) fetal abnormality and damage, fetal lower extremities anomalies, not applicable or unspecified       Multigravida of advanced maternal age in second trimester  EFW42% at 27 wks; f/u growth scheduled  Aware to d/c ASA at 36 wks    Hypothyroidism affecting pregnancy  Reminded to complete updated TSH    History of premature delivery, currently pregnant  Reminded to d/c vaginal progesterone at 36 wks

## 2023-11-03 ENCOUNTER — APPOINTMENT (OUTPATIENT)
Dept: LAB | Facility: HOSPITAL | Age: 38
End: 2023-11-03
Payer: COMMERCIAL

## 2023-11-03 DIAGNOSIS — O99.283 HYPOTHYROIDISM AFFECTING PREGNANCY IN THIRD TRIMESTER: ICD-10-CM

## 2023-11-03 DIAGNOSIS — E03.9 HYPOTHYROIDISM AFFECTING PREGNANCY IN THIRD TRIMESTER: ICD-10-CM

## 2023-11-03 LAB — TSH SERPL DL<=0.05 MIU/L-ACNC: 0.97 UIU/ML (ref 0.45–4.5)

## 2023-11-03 PROCEDURE — 36415 COLL VENOUS BLD VENIPUNCTURE: CPT

## 2023-11-03 PROCEDURE — 84443 ASSAY THYROID STIM HORMONE: CPT

## 2023-11-15 ENCOUNTER — ROUTINE PRENATAL (OUTPATIENT)
Dept: OBGYN CLINIC | Facility: CLINIC | Age: 38
End: 2023-11-15
Payer: COMMERCIAL

## 2023-11-15 VITALS — SYSTOLIC BLOOD PRESSURE: 132 MMHG | WEIGHT: 234 LBS | DIASTOLIC BLOOD PRESSURE: 72 MMHG | BODY MASS INDEX: 37.77 KG/M2

## 2023-11-15 DIAGNOSIS — O35.HXX0 MATERNAL CARE FOR OTHER (SUSPECTED) FETAL ABNORMALITY AND DAMAGE, FETAL LOWER EXTREMITIES ANOMALIES, NOT APPLICABLE OR UNSPECIFIED: ICD-10-CM

## 2023-11-15 DIAGNOSIS — O99.283 HYPOTHYROIDISM AFFECTING PREGNANCY IN THIRD TRIMESTER: ICD-10-CM

## 2023-11-15 DIAGNOSIS — O09.522 MULTIGRAVIDA OF ADVANCED MATERNAL AGE IN SECOND TRIMESTER: ICD-10-CM

## 2023-11-15 DIAGNOSIS — E03.9 HYPOTHYROIDISM AFFECTING PREGNANCY IN THIRD TRIMESTER: ICD-10-CM

## 2023-11-15 DIAGNOSIS — O09.899 HISTORY OF PREMATURE DELIVERY, CURRENTLY PREGNANT: ICD-10-CM

## 2023-11-15 DIAGNOSIS — Z3A.36 36 WEEKS GESTATION OF PREGNANCY: Primary | ICD-10-CM

## 2023-11-15 LAB
SL AMB  POCT GLUCOSE, UA: ABNORMAL
SL AMB POCT URINE PROTEIN: ABNORMAL

## 2023-11-15 PROCEDURE — 87150 DNA/RNA AMPLIFIED PROBE: CPT | Performed by: PHYSICIAN ASSISTANT

## 2023-11-15 PROCEDURE — PNV: Performed by: PHYSICIAN ASSISTANT

## 2023-11-15 PROCEDURE — 81002 URINALYSIS NONAUTO W/O SCOPE: CPT | Performed by: PHYSICIAN ASSISTANT

## 2023-11-15 NOTE — ASSESSMENT & PLAN NOTE
Aby Juárez  is a 43 y.o. [id number] @36w2d who presents for routine prenatal visit. Discussed ARRIVE trial and IOL options briefly     28 wk labs - normal labs  Growth scan- EFW42% at 32 wks; f/u growth scheduled for tomorrow  TDAP and flu up to date  GBS collected today  Delivery consents signed previously     Reports good fetal movement. Denies LOF, vaginal bleeding, regular uterine contractions, cramping, headaches or visual changes. Reviewed PTL/Labor precautions and FKC.

## 2023-11-15 NOTE — PROGRESS NOTES
36 weeks gestation of pregnancy  Mehdi  is a 43 y.o. [id number] @36w2d who presents for routine prenatal visit. Discussed ARRIVE trial and IOL options briefly     28 wk labs - normal labs  Growth scan- EFW42% at 32 wks; f/u growth scheduled for tomorrow  TDAP and flu up to date  GBS collected today  Delivery consents signed previously     Reports good fetal movement. Denies LOF, vaginal bleeding, regular uterine contractions, cramping, headaches or visual changes. Reviewed PTL/Labor precautions and FKC.       Multigravida of advanced maternal age in second trimester  EFW42% at 27 wks; f/u growth scheduled  Stopped ASA    History of premature delivery, currently pregnant  Stopped vaginal progesterone  F/u growth US tomorrow    Maternal care for other (suspected) fetal abnormality and damage, fetal lower extremities anomalies, not applicable or unspecified  B/l talipes - has not followed with ortho    Hypothyroidism affecting pregnancy  TSH on 11/3/2023 0.969  Taking levothyroxine 125 mcg daily

## 2023-11-15 NOTE — PROGRESS NOTES
Prenatal Visit   36w2d    PN1 completed  Nuchal completed   AFP completed  Level 2 completed  28 week labs completed   Consents signed  Breast pump ordered   Tdap UTD  Flu vaccine UTD   Gbs today     Denies LOF, VB, or CTX.   Pt has +FM  Patients urine is   Patient has no questions/concerns today

## 2023-11-16 ENCOUNTER — ULTRASOUND (OUTPATIENT)
Age: 38
End: 2023-11-16
Payer: COMMERCIAL

## 2023-11-16 VITALS
SYSTOLIC BLOOD PRESSURE: 128 MMHG | BODY MASS INDEX: 37.77 KG/M2 | HEART RATE: 98 BPM | HEIGHT: 66 IN | WEIGHT: 235 LBS | DIASTOLIC BLOOD PRESSURE: 76 MMHG

## 2023-11-16 DIAGNOSIS — Z3A.36 36 WEEKS GESTATION OF PREGNANCY: ICD-10-CM

## 2023-11-16 DIAGNOSIS — O35.HXX0 MATERNAL CARE FOR OTHER (SUSPECTED) FETAL ABNORMALITY AND DAMAGE, FETAL LOWER EXTREMITIES ANOMALIES, NOT APPLICABLE OR UNSPECIFIED: Primary | ICD-10-CM

## 2023-11-16 DIAGNOSIS — O09.523 MULTIGRAVIDA OF ADVANCED MATERNAL AGE IN THIRD TRIMESTER: ICD-10-CM

## 2023-11-16 PROCEDURE — 76816 OB US FOLLOW-UP PER FETUS: CPT | Performed by: OBSTETRICS & GYNECOLOGY

## 2023-11-16 PROCEDURE — 99213 OFFICE O/P EST LOW 20 MIN: CPT | Performed by: OBSTETRICS & GYNECOLOGY

## 2023-11-16 NOTE — PROGRESS NOTES
The patient was seen today for an ultrasound. Please see ultrasound report (located under Ob Procedures) for additional details. Thank you very much for allowing us to participate in the care of this very nice patient. Should you have any questions, please do not hesitate to contact me. Kulwant Cortez [place] [id number] [facility]  Attending Physician, [address] [place] [address]

## 2023-11-17 LAB — GP B STREP DNA SPEC QL NAA+PROBE: NEGATIVE

## 2023-11-21 ENCOUNTER — TELEPHONE (OUTPATIENT)
Dept: OBGYN CLINIC | Facility: CLINIC | Age: 38
End: 2023-11-21

## 2023-11-21 ENCOUNTER — OFFICE VISIT (OUTPATIENT)
Dept: OBGYN CLINIC | Facility: HOSPITAL | Age: 38
End: 2023-11-21
Payer: COMMERCIAL

## 2023-11-21 DIAGNOSIS — O35.HXX0 MATERNAL CARE FOR OTHER (SUSPECTED) FETAL ABNORMALITY AND DAMAGE, FETAL LOWER EXTREMITIES ANOMALIES, NOT APPLICABLE OR UNSPECIFIED: ICD-10-CM

## 2023-11-21 PROCEDURE — 99203 OFFICE O/P NEW LOW 30 MIN: CPT | Performed by: ORTHOPAEDIC SURGERY

## 2023-11-21 NOTE — PROGRESS NOTES
ASSESSMENT/PLAN:    Assessment:   43 y.o. female prenatal evaluation for bilateral clubfeet. Plan: Today I had a long discussion with the caregiver regarding the diagnosis and plan moving forward. Majority of the time today was spent discussing clubfeet and the treatment protocol. We discussed casting followed by Achilles tenotomy followed by bracing. We discussed pathophysiology as well as risks of recurrence and expectations for the child. Plan will be to follow-up with me in the first month of life to potentially begin casting at that time    Follow up: Within the first month of life    The above diagnosis and plan has been dicussed with the patient and caregiver. They verbalized an understanding and will follow up accordingly. _____________________________________________________  CHIEF COMPLAINT:  No chief complaint on file. SUBJECTIVE:  Georgette Coleman is a 43 y.o. female who presents today for prenatal evaluation for clubfeet. She is 37 weeks gestation pregnant with her second child. 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 Mom denies any other genetic or syndromic abnormalities picked up prenatally. Family history of clubfeet in cousin. PAST MEDICAL HISTORY:  Past Medical History:   Diagnosis Date    Hypothyroidism        PAST SURGICAL HISTORY:  History reviewed. No pertinent surgical history.     FAMILY HISTORY:  Family History   Problem Relation Age of Onset    Diabetes Mother     No Known Problems Half-Sister     No Known Problems Brother     No Known Problems Daughter     No Known Problems Maternal Grandmother     No Known Problems Paternal Grandmother     No Known Problems Paternal Grandfather        SOCIAL HISTORY:  Social History     Tobacco Use    Smoking status: Never     Passive exposure: Never    Smokeless tobacco: Never   Vaping Use    Vaping Use: Never used   Substance Use Topics    Alcohol use: Not Currently     Alcohol/week: 2.0 standard drinks of alcohol     Types: 2 Glasses of wine per week    Drug use: Never       MEDICATIONS:    Current Outpatient Medications:     levothyroxine 125 mcg tablet, Take 1 tablet (125 mcg total) by mouth daily, Disp: 90 tablet, Rfl: 1    Prenatal Vit-Fe Fumarate-FA (PRENATAL PO), Take by mouth, Disp: , Rfl:     ALLERGIES:  No Known Allergies    REVIEW OF SYSTEMS:  ROS is negative other than that noted in the HPI. Constitutional: Negative for fatigue and fever. HENT: Negative for sore throat. Respiratory: Negative for shortness of breath. Cardiovascular: Negative for chest pain. Gastrointestinal: Negative for abdominal pain. Endocrine: Negative for cold intolerance and heat intolerance. Genitourinary: Negative for flank pain. Musculoskeletal: Negative for back pain. Skin: Negative for rash. Allergic/Immunologic: Negative for immunocompromised state. Neurological: Negative for dizziness. Psychiatric/Behavioral: Negative for agitation. _____________________________________________________  PHYSICAL EXAMINATION:  There were no vitals filed for this visit.   General/Constitutional: NAD, well developed, well nourished  HENT: Normocephalic, atraumatic  CV: Intact distal pulses, regular rate  Resp: No respiratory distress or labored breathing  Abd: Soft and NT  Lymphatic: No lymphadenopathy palpated  Neuro: Alert,no focal deficits  Psych: Normal mood  Skin: Warm, dry, no rashes, no erythema      MUSCULOSKELETAL EXAMINATION:  Visibly pregnant 68-year-old female        _____________________________________________________  STUDIES REVIEWED:  No new imaging today       PROCEDURES PERFORMED:  Procedures  No Procedures performed today

## 2023-11-29 PROBLEM — Z3A.38 38 WEEKS GESTATION OF PREGNANCY: Status: ACTIVE | Noted: 2023-05-22

## 2023-11-29 NOTE — ASSESSMENT & PLAN NOTE
Jonn Mason is a 43 y.o. [id number]  38w3d who presents for routine PNV. Taking Levo & PNV daily  Would like induction, process explained, consent signed, message to OB pool   28 week labs reviewed: NML  TWG 23.6 kg (52 lb)   Denies OB complaints. Good fetal movement. Denies contractions, cramping, leakage of fluid or vaginal bleeding. Tdap vaccine completed  Reviewed  FKCs.    Advised to continue  Perineal massage at 34-36 weeks   Pregnancy Essential guide and Baby and Me web site recommended

## 2023-11-29 NOTE — PATIENT INSTRUCTIONS
Pregnancy at 44 to 40 Weeks   AMBULATORY CARE:   Changes happening with your body: You are now getting close to your due date. Your due date is just an estimate of when your baby will be born. Your baby may be born before or after your due date. Your breathing may be easier if your baby has moved down into a head-down position. You may need to urinate more often because the baby may be pressing on your bladder. You may also feel more discomfort and tire easily. You may also be having trouble sleeping. Seek care immediately if:   You develop a severe headache that does not go away. You have new or increased vision changes, such as blurred or spotted vision. You have new or increased swelling in your face or hands. You have vaginal spotting or bleeding. Your water broke or you feel warm water gushing or trickling from your vagina. Call your obstetrician if:   You have more than 5 contractions in 1 hour. You notice any changes in your baby's movements. You have abdominal cramps, pressure, or tightening. You have a change in vaginal discharge. You have chills or a fever. You have vaginal itching, burning, or pain. You have yellow, green, white, or foul-smelling vaginal discharge. You have pain or burning when you urinate, less urine than usual, or pink or bloody urine. You have questions or concerns about your condition or care. How to care for yourself at this stage of your pregnancy:       Eat a variety of healthy foods. Healthy foods include fruits, vegetables, whole-grain breads, low-fat dairy foods, beans, lean meats, and fish. Drink liquids as directed. Ask how much liquid to drink each day and which liquids are best for you. Limit caffeine to less than 200 milligrams each day. Limit your intake of fish to 2 servings each week. Choose fish low in mercury such as canned light tuna, shrimp, salmon, cod, or tilapia.  Do not  eat fish high in mercury such as swordfish, tilefish, parag mackerel, and shark. Take prenatal vitamins as directed. Your need for certain vitamins and minerals, such as folic acid, increases during pregnancy. Prenatal vitamins provide some of the extra vitamins and minerals you need. Prenatal vitamins may also help to decrease the risk of certain birth defects. Rest as needed. Put your feet up if you have swelling in your ankles and feet. Talk to your healthcare provider about exercise. Moderate exercise can help you stay fit. Your healthcare provider will help you plan an exercise program that is safe for you during pregnancy. Do not smoke. Smoking increases your risk of a miscarriage and other health problems during your pregnancy. Smoking can cause your baby to be born early or weigh less at birth. Ask your healthcare provider for information if you need help quitting. Do not drink alcohol. Alcohol passes from your body to your baby through the placenta. It can affect your baby's brain development and cause fetal alcohol syndrome (FAS). FAS is a group of conditions that causes mental, behavior, and growth problems. Talk to your healthcare provider before you take any medicines. Many medicines may harm your baby if you take them when you are pregnant. Do not take any medicines, vitamins, herbs, or supplements without first talking to your healthcare provider. Never use illegal or street drugs (such as marijuana or cocaine) while you are pregnant. Safety tips during pregnancy:   Avoid hot tubs and saunas. Do not use a hot tub or sauna while you are pregnant, especially during your first trimester. Hot tubs and saunas may raise your baby's temperature and increase the risk of birth defects. Avoid toxoplasmosis. This is an infection caused by eating raw meat or being around infected cat feces. It can cause birth defects, miscarriages, and other problems. Wash your hands after you touch raw meat.  Make sure any meat is well-cooked before you eat it. Avoid raw eggs and unpasteurized milk. Use gloves or ask someone else to clean your cat's litter box while you are pregnant. Ask your healthcare provider about travel. The most comfortable time to travel is during the second trimester. Ask your provider if you can travel after 36 weeks. You may not be able to travel in an airplane after 36 weeks. He or she may also recommend that you avoid long road trips. Changes happening with your baby: Your baby is ready to be born. At birth, your baby may weigh about 6 to 9 pounds and be about 19 to 21 inches long. Your baby may be in a head-down position. Your baby will also rest lower in your abdomen. What you need to know about prenatal care: Your healthcare provider will check your blood pressure and weight. You may also need the following:  A urine test  may also be done to check for sugar and protein. These can be signs of gestational diabetes or infection. Protein in your urine may also be a sign of preeclampsia. Preeclampsia is a condition that can develop during week 20 or later of your pregnancy. It causes high blood pressure, and it can cause problems with your kidneys and other organs. A gestational diabetes screen  may be done. Your healthcare provider may order either a 1-step or 2-step oral glucose tolerance test (OGTT). 1-step OGTT:  Your blood sugar level will be tested after you have not eaten for 8 hours (fasting). You will then be given a glucose drink. Your level will be tested again 1 hour and 2 hours after you finish the drink. 2-step OGTT:  You do not have to fast for the first part of the test. You will have the glucose drink at any time of day. Your blood sugar level will be checked 1 hour later. If your blood sugar is higher than a certain level, another test will be ordered. You will fast and your blood sugar level will be tested. You will have the glucose drink.  Your blood will be tested again 1 hour, 2 hours, and 3 hours after you finish the glucose drink. Your baby's heart rate  will be checked. Follow up with your obstetrician as directed:  Write down your questions so you remember to ask them during your visits. © Copyright Charity Bagley 2023 Information is for End User's use only and may not be sold, redistributed or otherwise used for commercial purposes. The above information is an  only. It is not intended as medical advice for individual conditions or treatments. Talk to your doctor, nurse or pharmacist before following any medical regimen to see if it is safe and effective for you. Kick Counts in Pregnancy   AMBULATORY CARE:   Kick counts  measure how much your baby is moving in your womb. A kick from your baby can be felt as a twist, turn, swish, roll, or jab. It is common to feel your baby kicking at 26 to 28 weeks of pregnancy. You may feel your baby kick as early as 20 weeks of pregnancy. You may want to start counting at 28 weeks. Contact your doctor immediately if:   You feel a change in the number of kicks or movements of your baby. You feel fewer than 10 kicks within 2 hours. You have questions or concerns about your baby's movements. Why measure kick counts:  Your baby's movement may provide information about your baby's health. He or she may move less, or not at all, if there are problems. Your baby may move less if he or she is not getting enough oxygen or nutrition from the placenta. Do not smoke while you are pregnant. Smoking decreases the amount of oxygen that gets to your baby. Talk to your healthcare provider if you need help to quit smoking. Tell your healthcare provider as soon as you feel a change in your baby's movements. When to measure kick counts:   Measure kick counts at the same time every day. Measure kick counts when your baby is awake and most active. Your baby may be most active in the evening.     How to measure kick counts:  Check that your baby is awake before you measure kick counts. You can wake up your baby by lightly pushing on your belly, walking, or drinking something cold. Your healthcare provider may tell you different ways to measure kick counts. You may be told to do the following:  Use a chart or clock to keep track of the time you start and finish counting. Sit in a chair or lie on your left side. Place your hands on the largest part of your belly. Count until you reach 10 kicks. Write down how much time it takes to count 10 kicks. It may take 30 minutes to 2 hours to count 10 kicks. It should not take more than 2 hours to count 10 kicks. Follow up with your doctor as directed:  Write down your questions so you remember to ask them during your visits. © Copyright Marie Mar 2023 Information is for End User's use only and may not be sold, redistributed or otherwise used for commercial purposes. The above information is an  only. It is not intended as medical advice for individual conditions or treatments. Talk to your doctor, nurse or pharmacist before following any medical regimen to see if it is safe and effective for you. Perineal Massage    Perineal massage is recommended starting after 34 weeks in order to reduce risks of perineal tearing during childbirth. You have been provided and instructional sheet in your yellow 28 week prenatal packet. Early Labor Signs   AMBULATORY CARE:   Early labor signs and symptoms  are the changes in your body that signal your baby is getting ready to be delivered. Early labor signs can happen weeks, days or hours before delivery. Call 911 for any of the following: You have heavy vaginal bleeding. You cannot get to the hospital before the baby starts to come out. Seek care immediately if:   You have regular, painful contractions that are less than 5 minutes apart and last 30 to 70 seconds each.     You have a constant trickle or sudden gush of clear fluid from your vagina. You notice a sudden decrease in your baby's movement. Contact your obstetrician or healthcare provider if:   You have pain in your lower back or abdomen that does not get better when you change positions. You have bloody mucus or show. You have questions or concerns about your condition or care. Early labor signs and symptoms:   Lightening  occurs when your baby drops inside your pelvis. You may feel increased pressure in your pelvis. This may happen a few weeks to a few hours before your labor begins. Contractions  are cramps and tightening that occur in your uterus to help move the baby through your birth canal. Contractions occur regularly and more often each time. Each one lasts about 30 to 70 seconds, and gets stronger until you deliver your baby. Contractions do not go away with movement. The pain usually starts in your lower back and moves to your abdomen. Effacement  occurs when your cervix softens and thins, so it can easily open for the baby. You will not be able to feel effacement. Your healthcare provider will examine your cervix for effacement. Dilation  is widening of your cervix. Your healthcare provider will examine your cervix for dilation. Your cervix may start to dilate weeks before your baby is delivered. Your cervix will be fully opened and ready for delivery when it is dilated to 10 centimeters. Increased discharge  from your vagina may occur. It may be brown, pink, clear, or slightly bloody. This discharge may also be called bloody show. Bloody show is a mucus plug that forms and blocks your cervix during pregnancy. The discharge may mean that your cervix is opening up and getting ready for delivery. Rupture of membranes  is a sudden release of clear fluid from your vagina. Ruptured membranes means your water broke.  Your healthcare provider may need to break your water if it does not happen on its own.    False labor: You may have false labor signs, which are also called [place] contractions. False labor is common and may happen several weeks or days before your actual labor. The contractions are not regular, and do not get closer together. The pain is usually mild, does not worsen, and is felt only in front. [place] contractions may happen later in the day, and stop after you change position, walk, or rest.  Follow up with your obstetrician or healthcare provider as directed:  Write down your questions so you remember to ask them during your visit. © Copyright Marzetta Fernandez 2023 Information is for End User's use only and may not be sold, redistributed or otherwise used for commercial purposes. The above information is an  only. It is not intended as medical advice for individual conditions or treatments. Talk to your doctor, nurse or pharmacist before following any medical regimen to see if it is safe and effective for you. Having Your Baby: The Labor Process   AMBULATORY CARE:   The labor process  is a series of 3 stages that your body goes through to deliver your baby. It is not known for sure what causes labor to begin. Hormones made by you and your baby and changes in your uterus may help labor to start. Labor usually starts 2 weeks before or after your due date. Most women do not have their baby exactly on their due date. Call your obstetrician if:   You have vaginal spotting or bleeding. Your water broke or you feel warm water gushing or trickling from your vagina. You have more than 5 contractions in 1 hour. You have bloody mucus or show. You notice any changes in your baby's movements. You have abdominal cramps, pressure, or tightening. You have a change in vaginal discharge. You have questions or concerns about your condition or care. First stage of labor: The first stage of labor includes latent (early) labor and active labor.  This stage may last up to 12 hours if this is your first pregnancy. It may last up to 10 hours if you delivered a baby before. Your uterus will contract to prepare your cervix for delivery and to push your baby out of the birth canal. Your cervix will dilate (widen) and efface (soften and become thinner). Your contractions may last from 30 to 60 seconds. The contractions usually start in the back and move to the front. You may also have a pink, clear, or slightly bloody discharge called bloody show. Bloody show is caused by the movement of a mucus plug from your cervix. During pregnancy the mucus plug blocks your cervix to prevent it from opening  What to do during early labor:  Early labor may last for several hours. You will most likely be at home during early labor. Rest as much as possible while you are at home. Have someone rub your back. It may be helpful to place ice packs on your lower back. Go for a short walk if you are able. Drink water and suck on ice chips. Ask your healthcare provider if it is okay to eat during early labor. How to know when you are in active labor: This stage may last up to 12 hours if this is your first pregnancy. It may last up to 10 hours if you delivered a baby before. Your contractions will get stronger, last longer, and happen more frequently. They will also become more intense and painful. Time your contractions from the beginning of one to the beginning of the next. Write this information down for 1 hour. Your healthcare provider will tell you when to go to the hospital or birthing center. This will be based on how many minutes apart your contractions are. Second stage of labor:  The second stage is the time between full cervix dilation and the birth of your baby. Your cervix will be completely dilated to 10 centimeters and your baby will be ready to be born. The second stage usually lasts 20 minutes to 2 hours. It may last up to 3 hours if this is your first baby.   You may be given antibiotics to fight a bacterial infection you have or prevent an infection during delivery. Healthcare providers will help you find a position for giving birth that is comfortable for you. You can lie on your back, have your feet up in stirrups, or squat. You may feel pressure on your rectum and the urge to push. This pressure is caused by the movement of your baby's head down the birth canal. Your healthcare provider will have you push when you feel the urge. He or she will guide your baby out of the birth canal. Forceps or suction may be used to help deliver your baby. You may also need an episiotomy (incision) to make the vaginal opening larger. This will make more room for your baby. Your perineum will be protected during delivery. This may be with a warm compress or massage of the area. At least 1 minute after your baby is born, your healthcare provider will put clamps on the umbilical cord. The cord will then be cut. Your baby may be placed on your chest right away. He or she may also start breastfeeding. Third stage of labor:  The placenta (afterbirth) is delivered during this stage. After you give birth, your uterus will continue to contract to help push out the placenta. These contractions will begin 5 to 30 minutes after you give birth. Your healthcare provider will tell you when to push. You may have chills or shakiness during this stage. You may be given medicine to help prevent heavy bleeding that can happen during this stage. How to manage labor pain:  Pain can be managed naturally or with medicines. You can naturally manage pain by using relaxation methods and controlled breathing. There are different types of medicines that can be used to relieve pain while you are in labor. These medicines may be given through an IV or an epidural (thin catheter in your lower back). Talk with your healthcare about your options for pain medicines if you choose to use them.  Tell your provider if you prefer not to have any pain control medicines during labor. © Copyright Meaghan Mueller 2023 Information is for End User's use only and may not be sold, redistributed or otherwise used for commercial purposes. The above information is an  only. It is not intended as medical advice for individual conditions or treatments. Talk to your doctor, nurse or pharmacist before following any medical regimen to see if it is safe and effective for you. Induction of Labor   WHAT YOU NEED TO KNOW:   What is induction of labor? Induction of labor is a procedure to induce (start) your labor before it begins on its own. Medicines and other methods are used to start contractions and help your cervix soften, thin (efface), and dilate (open). You may be given antibiotics to fight a bacterial infection you have or prevent an infection during delivery. Why might I need induction of labor? A health problem you have, such as high blood pressure or diabetes    A health problem your baby has, such as a slow heartbeat or poor growth inside the womb     Problems related to your pregnancy, such as infection of the amniotic fluid, your water breaks before labor begins, or you have too little amniotic fluid    What happens during induction of labor? Your healthcare provider may use one or more of the following to induce labor:  Cervical ripening  is a process that helps to soften and thin out your cervix. Medicines called prostaglandins may be used to ripen your cervix. These medicines can be inserted into your vagina or taken as a pill. Other methods can also be used to dilate the cervix. This includes a catheter with an inflatable balloon on the end that is inserted into your cervix. Saline injected through the catheter helps the balloon to expand. A substance that absorbs water may also be inserted into your cervix to help dilate it.      Stripping the membranes  is a procedure that causes your body to release prostaglandins naturally. Prostaglandins soften the cervix and may help to cause contractions. Your healthcare provider will sweep a gloved finger over the membranes that connect the amniotic sac to the uterus wall. Rupturing the amniotic sac  is a procedure that is used to cause your water to break. Your healthcare provider will use a small tool to make a hole in your amniotic sac. This may help contractions to start. Oxytocin  may be given through an IV to cause contractions to start and stay strong and regular. What are the risks of induction of labor? Medicines used to induce labor may cause too many contractions. This can lower your baby's heartbeat and decrease his or her oxygen supply. Induction of labor also increases the risk of umbilical cord prolapse. This condition causes the umbilical cord to slip back into the vagina before delivery. It can compress the cord and decrease your baby's oxygen supply. Medical induction may cause an infection in you or your baby. Medical induction may also increase your risk for a  section (), especially if it is the first time you give birth. Your uterus may rupture if you have had a  before. CARE AGREEMENT:   You have the right to help plan your care. Learn about your health condition and how it may be treated. Discuss treatment options with your healthcare providers to decide what care you want to receive. You always have the right to refuse treatment. The above information is an  only. It is not intended as medical advice for individual conditions or treatments. Talk to your doctor, nurse or pharmacist before following any medical regimen to see if it is safe and effective for you. © Copyright Mayra Prom  Information is for End User's use only and may not be sold, redistributed or otherwise used for commercial purposes.

## 2023-11-30 ENCOUNTER — TELEPHONE (OUTPATIENT)
Dept: OBGYN CLINIC | Facility: CLINIC | Age: 38
End: 2023-11-30

## 2023-11-30 ENCOUNTER — ROUTINE PRENATAL (OUTPATIENT)
Dept: OBGYN CLINIC | Facility: CLINIC | Age: 38
End: 2023-11-30
Payer: COMMERCIAL

## 2023-11-30 VITALS
DIASTOLIC BLOOD PRESSURE: 74 MMHG | HEIGHT: 66 IN | WEIGHT: 237 LBS | SYSTOLIC BLOOD PRESSURE: 124 MMHG | BODY MASS INDEX: 38.09 KG/M2

## 2023-11-30 DIAGNOSIS — Z34.83 PRENATAL CARE, SUBSEQUENT PREGNANCY, THIRD TRIMESTER: Primary | ICD-10-CM

## 2023-11-30 LAB
SL AMB  POCT GLUCOSE, UA: ABNORMAL
SL AMB POCT URINE PROTEIN: ABNORMAL

## 2023-11-30 PROCEDURE — PNV: Performed by: OBSTETRICS & GYNECOLOGY

## 2023-11-30 PROCEDURE — 81002 URINALYSIS NONAUTO W/O SCOPE: CPT | Performed by: OBSTETRICS & GYNECOLOGY

## 2023-11-30 NOTE — PROGRESS NOTES
Problem   38 Weeks Gestation of Pregnancy    First OB labs - complete  Gc/chlamydia -neg  Pap - neg/neg  Blue folder - given    NIPT low risk  AFP - neg     28 week labs - normal   COVID vaccine -   TDAP - 9/18/23  Delivery consent - signed   Yellow folder - given     Breast pump - ordered  Pediatrician - established   Perineal massage - encouraged   GBS -          38 weeks gestation of pregnancy  Dianefreddy Li is a 43 y.o. [id number]  38w3d who presents for routine PNV. Taking Levo & PNV daily  Would like induction, process explained, consent signed, message to OB pool   28 week labs reviewed: NML  TWG 23.6 kg (52 lb)   Denies OB complaints. Good fetal movement. Denies contractions, cramping, leakage of fluid or vaginal bleeding. Tdap vaccine completed  Reviewed  FKCs.    Advised to continue  Perineal massage at 34-36 weeks   Pregnancy Essential guide and Baby and Me web site recommended

## 2023-11-30 NOTE — PROGRESS NOTES
[id number] 38w3d  Pap 04/10/2023. GC/CT:  PN1 Labs:   Blood Type: O Positive :   MFM Level 1: 8/1/2023  MFM Level 2:10/17/2023  AFP: 7/17/2023  28 Week Labs:9/8/2023  Normal   TDap:9/18/2023  Flu:10/3/2023  GBS: 11/15/2023  Negative   Blue Folder: given   Yellow Folder:given   Ped Referral :given   Breast pump:  L&D forms:Done  Delivery consent: Done     Patient report positive fetal movements with no lost of fluids and no contractions . Patient reports having some vaginal pressure .

## 2023-11-30 NOTE — TELEPHONE ENCOUNTER
----- Message from Leopoldo Chaudhari, [address] [place] [address] sent at 11/30/2023  3:55 PM EST -----  Procedure to be scheduled: IOL   NIDHI: 12/11/23  Cervical dialtion:0 / 50 -3  Indication for delivery: elective   Requested date (s) of delivery:  44 and onwards   If requested date is unavailable, is there a date by which the pt must be delivered?    Physician preference: none       If IOL, anticipated method: Pitocin

## 2023-12-03 ENCOUNTER — NURSE TRIAGE (OUTPATIENT)
Dept: OTHER | Facility: OTHER | Age: 38
End: 2023-12-03

## 2023-12-03 ENCOUNTER — HOSPITAL ENCOUNTER (INPATIENT)
Facility: HOSPITAL | Age: 38
LOS: 2 days | Discharge: HOME | End: 2023-12-05
Attending: OBSTETRICS & GYNECOLOGY | Admitting: OBSTETRICS & GYNECOLOGY
Payer: COMMERCIAL

## 2023-12-03 DIAGNOSIS — Z3A.38 38 WEEKS GESTATION OF PREGNANCY: Primary | ICD-10-CM

## 2023-12-03 PROBLEM — O42.90 AMNIOTIC FLUID LEAKING: Status: ACTIVE | Noted: 2023-12-03

## 2023-12-03 LAB
ABO GROUP BLD: NORMAL
BLD GP AB SCN SERPL QL: NEGATIVE
ERYTHROCYTE [DISTWIDTH] IN BLOOD BY AUTOMATED COUNT: 14.8 % (ref 11.6–15.1)
HCT VFR BLD AUTO: 41.6 % (ref 34.8–46.1)
HGB BLD-MCNC: 13.5 G/DL (ref 11.5–15.4)
HOLD SPECIMEN: NORMAL
MCH RBC QN AUTO: 29 PG (ref 26.8–34.3)
MCHC RBC AUTO-ENTMCNC: 32.5 G/DL (ref 31.4–37.4)
MCV RBC AUTO: 90 FL (ref 82–98)
PLATELET # BLD AUTO: 176 THOUSANDS/UL (ref 149–390)
PMV BLD AUTO: 11.2 FL (ref 8.9–12.7)
RBC # BLD AUTO: 4.65 MILLION/UL (ref 3.81–5.12)
RH BLD: POSITIVE
SPECIMEN EXPIRATION DATE: NORMAL
WBC # BLD AUTO: 8.04 THOUSAND/UL (ref 4.31–10.16)

## 2023-12-03 PROCEDURE — 86900 BLOOD TYPING SEROLOGIC ABO: CPT

## 2023-12-03 PROCEDURE — 4A1HXCZ MONITORING OF PRODUCTS OF CONCEPTION, CARDIAC RATE, EXTERNAL APPROACH: ICD-10-PCS | Performed by: STUDENT IN AN ORGANIZED HEALTH CARE EDUCATION/TRAINING PROGRAM

## 2023-12-03 PROCEDURE — 99213 OFFICE O/P EST LOW 20 MIN: CPT

## 2023-12-03 PROCEDURE — 86901 BLOOD TYPING SEROLOGIC RH(D): CPT

## 2023-12-03 PROCEDURE — 85027 COMPLETE CBC AUTOMATED: CPT

## 2023-12-03 PROCEDURE — 86850 RBC ANTIBODY SCREEN: CPT

## 2023-12-03 PROCEDURE — NC001 PR NO CHARGE: Performed by: OBSTETRICS & GYNECOLOGY

## 2023-12-03 PROCEDURE — 80053 COMPREHEN METABOLIC PANEL: CPT

## 2023-12-03 PROCEDURE — 86780 TREPONEMA PALLIDUM: CPT

## 2023-12-03 RX ORDER — ONDANSETRON 2 MG/ML
4 INJECTION INTRAMUSCULAR; INTRAVENOUS EVERY 6 HOURS PRN
Status: DISCONTINUED | OUTPATIENT
Start: 2023-12-03 | End: 2023-12-04

## 2023-12-03 RX ORDER — LEVOTHYROXINE SODIUM 0.12 MG/1
125 TABLET ORAL
Status: DISCONTINUED | OUTPATIENT
Start: 2023-12-04 | End: 2023-12-05 | Stop reason: HOSPADM

## 2023-12-03 RX ORDER — SODIUM CHLORIDE, SODIUM LACTATE, POTASSIUM CHLORIDE, CALCIUM CHLORIDE 600; 310; 30; 20 MG/100ML; MG/100ML; MG/100ML; MG/100ML
125 INJECTION, SOLUTION INTRAVENOUS CONTINUOUS
Status: DISCONTINUED | OUTPATIENT
Start: 2023-12-03 | End: 2023-12-04

## 2023-12-03 RX ORDER — BUPIVACAINE HYDROCHLORIDE 2.5 MG/ML
30 INJECTION, SOLUTION EPIDURAL; INFILTRATION; INTRACAUDAL ONCE AS NEEDED
Status: DISCONTINUED | OUTPATIENT
Start: 2023-12-03 | End: 2023-12-04

## 2023-12-03 NOTE — TELEPHONE ENCOUNTER
Answer Assessment - Initial Assessment Questions  1. REASON FOR CALL or QUESTION: "What is your reason for calling today?" or "How can I best help you?" or "What question do you have that I can help answer?"      Pt was just triaged about an hour ago and followed provider's recommendations. Drank water and fetal movement returned back - had 5 kicks/hour. Also wanted to know if able to return back to work tomorrow    Protocols used: Information Only Call - No Triage-ADULT-AH          Notified on call provider- per Dr. Gamal Mae, if feeling well, no restrictions. Patient made aware and verbalized understanding.

## 2023-12-03 NOTE — TELEPHONE ENCOUNTER
Regarding: pregnant/brown discharge/slight cramps  ----- Message from Yesenia Howell sent at 12/3/2023  4:07 PM EST -----  " I noticed brown discharge when I wiped myself, I'm concerned.  I have slight cramps."

## 2023-12-03 NOTE — TELEPHONE ENCOUNTER
Reason for Disposition  • Abdominal pain or having contractions    Answer Assessment - Initial Assessment Questions  1. ONSET: "When did this bleeding start?"       yesterday    2. DESCRIPTION: "Describe the bleeding that you are having." "How much bleeding is there?"     - SPOTTING: spotting, or pinkish / brownish mucous discharge; does not fill panti-liner or pad     - MILD:  less than 1 pad / hour; less than patient's usual menstrual bleeding    - MODERATE: 1-2 pads / hour; 1 menstrual cup every 6 hours; small-medium blood clots (e.g., pea, grape, small coin)    - SEVERE: soaking 2 or more pads/hour for 2 or more hours; 1 menstrual cup every 2 hours; bleeding not contained by pads or continuous red blood from vagina; large blood clots (e.g., golf ball, large coin)       Spotting, brown discharge with mucous    3. ABDOMINAL PAIN SEVERITY: If present, ask: "How bad is it?"  (e.g., Scale 1-10; mild, moderate, or severe)    - MILD (1-3): doesn't interfere with normal activities, abdomen soft and not tender to touch     - MODERATE (4-7): interferes with normal activities or awakens from sleep, tender to touch     - SEVERE (8-10): excruciating pain, doubled over, unable to do any normal activities      Mild, lower, intermittent abdominal cramping    4. PREGNANCY: "Do you know how many weeks or months pregnant you are?"       Yes, 38 weeks 6 days     5. NIDHI: "What date are you expecting to deliver?"      12/11/23    6. FETAL MOVEMENT: "Has the baby's movement decreased or changed significantly from normal?"      Decreased, last felt the baby move around 1400     7. HEMODYNAMIC STATUS: "Are you weak or feeling lightheaded?" If Yes, ask: "Can you stand and walk normally?"       Denies    8.  OTHER SYMPTOMS: "What other symptoms are you having with the bleeding?" (e.g., leaking fluid from vagina, contractions)      Denies    Protocols used: Pregnancy - Vaginal Bleeding Greater Than 20 Weeks [id number]

## 2023-12-03 NOTE — TELEPHONE ENCOUNTER
Regardin Weeks Pregnant, told to drink water for babies movement and to call back.   ----- Message from Reny Gold sent at 12/3/2023  5:38 PM EST -----  " I am 39 Weeks Pregnant, I talked to a Nurse earlier and was told to call back after drinking water, my Baby Moved 5 times in one Hour."

## 2023-12-04 ENCOUNTER — ANESTHESIA (INPATIENT)
Dept: ANESTHESIOLOGY | Facility: HOSPITAL | Age: 38
End: 2023-12-04
Payer: COMMERCIAL

## 2023-12-04 ENCOUNTER — ANESTHESIA EVENT (INPATIENT)
Dept: ANESTHESIOLOGY | Facility: HOSPITAL | Age: 38
End: 2023-12-04
Payer: COMMERCIAL

## 2023-12-04 PROBLEM — R03.0 ELEVATED BLOOD PRESSURE READING WITHOUT DIAGNOSIS OF HYPERTENSION: Status: ACTIVE | Noted: 2023-12-04

## 2023-12-04 PROBLEM — O13.9 GESTATIONAL HYPERTENSION: Status: ACTIVE | Noted: 2023-12-04

## 2023-12-04 LAB
ALBUMIN SERPL BCP-MCNC: 3.1 G/DL (ref 3.5–5)
ALBUMIN SERPL BCP-MCNC: 3.7 G/DL (ref 3.5–5)
ALP SERPL-CCNC: 132 U/L (ref 34–104)
ALP SERPL-CCNC: 154 U/L (ref 34–104)
ALT SERPL W P-5'-P-CCNC: 17 U/L (ref 7–52)
ALT SERPL W P-5'-P-CCNC: 20 U/L (ref 7–52)
ANION GAP SERPL CALCULATED.3IONS-SCNC: 7 MMOL/L
ANION GAP SERPL CALCULATED.3IONS-SCNC: 9 MMOL/L
AST SERPL W P-5'-P-CCNC: 22 U/L (ref 13–39)
AST SERPL W P-5'-P-CCNC: 25 U/L (ref 13–39)
BASE EXCESS BLDCOV CALC-SCNC: -5.1 MMOL/L (ref 1–9)
BILIRUB SERPL-MCNC: 0.3 MG/DL (ref 0.2–1)
BILIRUB SERPL-MCNC: 0.49 MG/DL (ref 0.2–1)
BUN SERPL-MCNC: 5 MG/DL (ref 5–25)
BUN SERPL-MCNC: 8 MG/DL (ref 5–25)
CALCIUM ALBUM COR SERPL-MCNC: 9.2 MG/DL (ref 8.3–10.1)
CALCIUM SERPL-MCNC: 8.5 MG/DL (ref 8.4–10.2)
CALCIUM SERPL-MCNC: 9 MG/DL (ref 8.4–10.2)
CHLORIDE SERPL-SCNC: 105 MMOL/L (ref 96–108)
CHLORIDE SERPL-SCNC: 106 MMOL/L (ref 96–108)
CO2 SERPL-SCNC: 19 MMOL/L (ref 21–32)
CO2 SERPL-SCNC: 22 MMOL/L (ref 21–32)
CREAT SERPL-MCNC: 0.46 MG/DL (ref 0.6–1.3)
CREAT SERPL-MCNC: 0.5 MG/DL (ref 0.6–1.3)
CREAT UR-MCNC: 61.2 MG/DL
CREAT UR-MCNC: 76.5 MG/DL
ERYTHROCYTE [DISTWIDTH] IN BLOOD BY AUTOMATED COUNT: 14.9 % (ref 11.6–15.1)
GFR SERPL CREATININE-BSD FRML MDRD: 122 ML/MIN/1.73SQ M
GFR SERPL CREATININE-BSD FRML MDRD: 125 ML/MIN/1.73SQ M
GLUCOSE SERPL-MCNC: 100 MG/DL (ref 65–140)
GLUCOSE SERPL-MCNC: 79 MG/DL (ref 65–140)
HCO3 BLDCOV-SCNC: 19.4 MMOL/L (ref 12.2–28.6)
HCT VFR BLD AUTO: 37.2 % (ref 34.8–46.1)
HGB BLD-MCNC: 12.4 G/DL (ref 11.5–15.4)
MCH RBC QN AUTO: 30.3 PG (ref 26.8–34.3)
MCHC RBC AUTO-ENTMCNC: 33.3 G/DL (ref 31.4–37.4)
MCV RBC AUTO: 91 FL (ref 82–98)
OXYHGB MFR BLDCOV: 80.9 %
PCO2 BLDCOV: 34.8 MM HG (ref 27–43)
PH BLDCOV: 7.36 [PH] (ref 7.19–7.49)
PLATELET # BLD AUTO: 165 THOUSANDS/UL (ref 149–390)
PMV BLD AUTO: 11.5 FL (ref 8.9–12.7)
PO2 BLDCOV: 38.2 MM HG (ref 15–45)
POTASSIUM SERPL-SCNC: 3.8 MMOL/L (ref 3.5–5.3)
POTASSIUM SERPL-SCNC: 4 MMOL/L (ref 3.5–5.3)
PROT SERPL-MCNC: 5.7 G/DL (ref 6.4–8.4)
PROT SERPL-MCNC: 6.7 G/DL (ref 6.4–8.4)
PROT UR-MCNC: 13 MG/DL
PROT UR-MCNC: 23 MG/DL
PROT/CREAT UR: 0.21 MG/G{CREAT} (ref 0–0.1)
PROT/CREAT UR: 0.3 MG/G{CREAT} (ref 0–0.1)
RBC # BLD AUTO: 4.09 MILLION/UL (ref 3.81–5.12)
SAO2 % BLDCOV: 18.2 ML/DL
SODIUM SERPL-SCNC: 134 MMOL/L (ref 135–147)
SODIUM SERPL-SCNC: 134 MMOL/L (ref 135–147)
TREPONEMA PALLIDUM IGG+IGM AB [PRESENCE] IN SERUM OR PLASMA BY IMMUNOASSAY: NORMAL
WBC # BLD AUTO: 19.05 THOUSAND/UL (ref 4.31–10.16)

## 2023-12-04 PROCEDURE — 84156 ASSAY OF PROTEIN URINE: CPT

## 2023-12-04 PROCEDURE — 84156 ASSAY OF PROTEIN URINE: CPT | Performed by: OBSTETRICS & GYNECOLOGY

## 2023-12-04 PROCEDURE — 85027 COMPLETE CBC AUTOMATED: CPT | Performed by: OBSTETRICS & GYNECOLOGY

## 2023-12-04 PROCEDURE — 59400 OBSTETRICAL CARE: CPT | Performed by: STUDENT IN AN ORGANIZED HEALTH CARE EDUCATION/TRAINING PROGRAM

## 2023-12-04 PROCEDURE — 82805 BLOOD GASES W/O2 SATURATION: CPT | Performed by: OBSTETRICS & GYNECOLOGY

## 2023-12-04 PROCEDURE — 80053 COMPREHEN METABOLIC PANEL: CPT | Performed by: OBSTETRICS & GYNECOLOGY

## 2023-12-04 PROCEDURE — 82570 ASSAY OF URINE CREATININE: CPT | Performed by: OBSTETRICS & GYNECOLOGY

## 2023-12-04 PROCEDURE — 0KQM0ZZ REPAIR PERINEUM MUSCLE, OPEN APPROACH: ICD-10-PCS | Performed by: STUDENT IN AN ORGANIZED HEALTH CARE EDUCATION/TRAINING PROGRAM

## 2023-12-04 PROCEDURE — 82570 ASSAY OF URINE CREATININE: CPT

## 2023-12-04 PROCEDURE — 0UQGXZZ REPAIR VAGINA, EXTERNAL APPROACH: ICD-10-PCS | Performed by: STUDENT IN AN ORGANIZED HEALTH CARE EDUCATION/TRAINING PROGRAM

## 2023-12-04 RX ORDER — SENNOSIDES 8.6 MG
1 TABLET ORAL DAILY
Status: DISCONTINUED | OUTPATIENT
Start: 2023-12-04 | End: 2023-12-05 | Stop reason: HOSPADM

## 2023-12-04 RX ORDER — TRANEXAMIC ACID 10 MG/ML
1000 INJECTION, SOLUTION INTRAVENOUS ONCE
Status: COMPLETED | OUTPATIENT
Start: 2023-12-04 | End: 2023-12-04

## 2023-12-04 RX ORDER — DIPHENHYDRAMINE HYDROCHLORIDE 50 MG/ML
25 INJECTION INTRAMUSCULAR; INTRAVENOUS EVERY 6 HOURS PRN
Status: DISCONTINUED | OUTPATIENT
Start: 2023-12-04 | End: 2023-12-05 | Stop reason: HOSPADM

## 2023-12-04 RX ORDER — LIDOCAINE HYDROCHLORIDE AND EPINEPHRINE 15; 5 MG/ML; UG/ML
INJECTION, SOLUTION EPIDURAL
Status: COMPLETED | OUTPATIENT
Start: 2023-12-04 | End: 2023-12-04

## 2023-12-04 RX ORDER — ACETAMINOPHEN 325 MG/1
650 TABLET ORAL EVERY 4 HOURS PRN
Status: DISCONTINUED | OUTPATIENT
Start: 2023-12-04 | End: 2023-12-05 | Stop reason: HOSPADM

## 2023-12-04 RX ORDER — IBUPROFEN 600 MG/1
600 TABLET ORAL EVERY 6 HOURS PRN
Status: DISCONTINUED | OUTPATIENT
Start: 2023-12-04 | End: 2023-12-05 | Stop reason: HOSPADM

## 2023-12-04 RX ORDER — CEFOXITIN SODIUM 2 G/50ML
2000 INJECTION, SOLUTION INTRAVENOUS ONCE
Status: COMPLETED | OUTPATIENT
Start: 2023-12-04 | End: 2023-12-04

## 2023-12-04 RX ORDER — OXYCODONE HYDROCHLORIDE 5 MG/1
5 TABLET ORAL ONCE
Status: COMPLETED | OUTPATIENT
Start: 2023-12-04 | End: 2023-12-04

## 2023-12-04 RX ORDER — OXYTOCIN/RINGER'S LACTATE 30/500 ML
250 PLASTIC BAG, INJECTION (ML) INTRAVENOUS ONCE
Status: DISCONTINUED | OUTPATIENT
Start: 2023-12-04 | End: 2023-12-05 | Stop reason: HOSPADM

## 2023-12-04 RX ORDER — SIMETHICONE 80 MG
80 TABLET,CHEWABLE ORAL 4 TIMES DAILY PRN
Status: DISCONTINUED | OUTPATIENT
Start: 2023-12-04 | End: 2023-12-05 | Stop reason: HOSPADM

## 2023-12-04 RX ORDER — ONDANSETRON 2 MG/ML
4 INJECTION INTRAMUSCULAR; INTRAVENOUS EVERY 8 HOURS PRN
Status: DISCONTINUED | OUTPATIENT
Start: 2023-12-04 | End: 2023-12-05 | Stop reason: HOSPADM

## 2023-12-04 RX ORDER — BUPIVACAINE HYDROCHLORIDE 2.5 MG/ML
INJECTION, SOLUTION EPIDURAL; INFILTRATION; INTRACAUDAL AS NEEDED
Status: DISCONTINUED | OUTPATIENT
Start: 2023-12-04 | End: 2023-12-07 | Stop reason: HOSPADM

## 2023-12-04 RX ORDER — CARBOPROST TROMETHAMINE 250 UG/ML
INJECTION, SOLUTION INTRAMUSCULAR
Status: DISPENSED
Start: 2023-12-04 | End: 2023-12-05

## 2023-12-04 RX ORDER — OXYTOCIN/RINGER'S LACTATE 30/500 ML
1-30 PLASTIC BAG, INJECTION (ML) INTRAVENOUS
Status: DISCONTINUED | OUTPATIENT
Start: 2023-12-04 | End: 2023-12-04

## 2023-12-04 RX ORDER — CALCIUM CARBONATE 500 MG/1
1000 TABLET, CHEWABLE ORAL DAILY PRN
Status: DISCONTINUED | OUTPATIENT
Start: 2023-12-04 | End: 2023-12-05 | Stop reason: HOSPADM

## 2023-12-04 RX ORDER — DOCUSATE SODIUM 100 MG/1
100 CAPSULE, LIQUID FILLED ORAL 2 TIMES DAILY
Status: DISCONTINUED | OUTPATIENT
Start: 2023-12-04 | End: 2023-12-05 | Stop reason: HOSPADM

## 2023-12-04 RX ORDER — METHYLERGONOVINE MALEATE 0.2 MG/ML
INJECTION INTRAVENOUS
Status: DISPENSED
Start: 2023-12-04 | End: 2023-12-05

## 2023-12-04 RX ORDER — DIAPER,BRIEF,INFANT-TODD,DISP
1 EACH MISCELLANEOUS DAILY PRN
Status: DISCONTINUED | OUTPATIENT
Start: 2023-12-04 | End: 2023-12-05 | Stop reason: HOSPADM

## 2023-12-04 RX ADMIN — TRANEXAMIC ACID 1000 MG: 10 INJECTION, SOLUTION INTRAVENOUS at 13:30

## 2023-12-04 RX ADMIN — DOCUSATE SODIUM 100 MG: 100 CAPSULE, LIQUID FILLED ORAL at 18:18

## 2023-12-04 RX ADMIN — OXYCODONE HYDROCHLORIDE 5 MG: 5 TABLET ORAL at 14:54

## 2023-12-04 RX ADMIN — BUPIVACAINE HYDROCHLORIDE 10 ML: 2.5 INJECTION, SOLUTION EPIDURAL; INFILTRATION; INTRACAUDAL; PERINEURAL at 02:45

## 2023-12-04 RX ADMIN — IBUPROFEN 600 MG: 600 TABLET, FILM COATED ORAL at 14:28

## 2023-12-04 RX ADMIN — Medication 2 MILLI-UNITS/MIN: at 07:06

## 2023-12-04 RX ADMIN — SODIUM CHLORIDE, SODIUM LACTATE, POTASSIUM CHLORIDE, AND CALCIUM CHLORIDE 999 ML/HR: .6; .31; .03; .02 INJECTION, SOLUTION INTRAVENOUS at 01:16

## 2023-12-04 RX ADMIN — LIDOCAINE HYDROCHLORIDE AND EPINEPHRINE 5 ML: 15; 5 INJECTION, SOLUTION EPIDURAL at 02:42

## 2023-12-04 RX ADMIN — ROPIVACAINE HYDROCHLORIDE: 2 INJECTION, SOLUTION EPIDURAL; INFILTRATION at 02:53

## 2023-12-04 RX ADMIN — CEFOXITIN SODIUM 2000 MG: 2 INJECTION, SOLUTION INTRAVENOUS at 14:18

## 2023-12-04 RX ADMIN — ACETAMINOPHEN 650 MG: 325 TABLET, FILM COATED ORAL at 18:18

## 2023-12-04 RX ADMIN — SODIUM CHLORIDE, SODIUM LACTATE, POTASSIUM CHLORIDE, AND CALCIUM CHLORIDE 125 ML/HR: .6; .31; .03; .02 INJECTION, SOLUTION INTRAVENOUS at 07:17

## 2023-12-04 RX ADMIN — LEVOTHYROXINE SODIUM 125 MCG: 125 TABLET ORAL at 06:46

## 2023-12-04 RX ADMIN — SODIUM CHLORIDE, SODIUM LACTATE, POTASSIUM CHLORIDE, AND CALCIUM CHLORIDE 999 ML/HR: .6; .31; .03; .02 INJECTION, SOLUTION INTRAVENOUS at 02:20

## 2023-12-04 RX ADMIN — ROPIVACAINE HYDROCHLORIDE: 2 INJECTION, SOLUTION EPIDURAL; INFILTRATION at 10:19

## 2023-12-04 NOTE — DISCHARGE SUMMARY
Obstetrics Discharge Summary  Gregory Koenig 43 y.o. female MRN: [id number]  Unit/Bed#: -01 Encounter: [id number]    Admission Date: 12/3/2023     Discharge Date: 2023    Admitting Attending: Dr. Heri Arellano  Delivery Attending: Dr. Edgardo Kolb  Discharging Attending: Dr. Marry Mendiola    Admitting Diagnoses:   Pregnancy at 38w6d  Hypothyroidism  Spontaneous rupture of membranes    Discharge Diagnoses:   Full term vaginal delivery  Preeclampsia without severe fetaures    Procedures: Low forceps assisted vaginal delivery    Anesthesia: epidural    Hospital course: Gregory Koenig is now a 43 y.o. [id number] who was initially admitted at 38w6d following SROM. She receievd an epidural and labor was uagmented with Pitocin. She progressed to complete cervical dilation and began pushing. She was diagnosed with preeclampsia without severe features intrapartum based on elevated blood pressures and a P/C ratio of 0.3. On 2023 she delivered a viable male  39w0d via vaginal delivery with forceps assistance. She sustained a second degree perineal and left vaginal lacerations during delivery and these were adequately repaired. 's birth weight was 8lbs 9.4oz; Apgars were 8 (1 min) and 9 (5 min).  was admitted to the  nursery. Patient tolerated the procedure well. Blood pressures were normal in the postpartum period without antihypertensive therapy. Her postpartum pain was well controlled with oral analgesics. Maternal blood type is O pos so RhoGAM was not indicated. On day of discharge, she was ambulating and able to reasonably perform all ADLs. She was voiding and had appropriate bowel function. Pain was well controlled. She was discharged home on postpartum day #1 without further complications.  Patient was instructed to follow up with her OBGYN as an outpatient and was given appropriate warnings to call provider if she develops signs of infection or uncontrolled pain.    Complications: none apparent    Condition at discharge: good     Provisions for Follow-Up Care:  Please see after visit summary for information related to follow-up care and any pertinent home health orders. Disposition: Home    Planned Readmission: No    Discharge Medications:   Please see AVS for a complete list of discharge medications. Discharge instructions :   Please see AVS for complete discharge instructions. Samantha Lake MD  PGY-IV, OB/GYN  12/6/2023, 1:46 PM

## 2023-12-04 NOTE — L&D DELIVERY NOTE
DELIVERY NOTE  Lyric Kearns 43 y.o. female MRN: [id number]  Unit/Bed#:  201-01 Encounter: [id number]    Obstetrician:   Terry Rodriguez MD    Assistant: Daina Brand MD    Pre-Delivery Diagnosis:   Term pregnancy  Single fetus  Gestational HTN  Category II fetal heart tracing    Post-Delivery Diagnosis: Same as above - Delivered    Procedure: low forceps vaginal delivery    Delivery Date and Time:  84/9/1465 at 3594    Umbilical Vein   Recent Labs     12/04/23  1316   PHCVEN 7.363   BECVEN -5.1*       Umbilical artery sample insufficient for analysis    Apgars: 8 and 9 at 1 and 5 minutes    Weight: pending    Indications for instrumental delivery: Suspicion of immediate or potential fetal compromise    Non-Surgical QBL (mL): 633           Complications:  None    Brief description of labor:  Patient presented on 12/3/2023 to [facility] triage with complaint of leakage of fluid. Initial cervical exam was 4/60/-3. After a period of expectant management she made a small amount of change to 5/70/-3. Labor was then augmented with Pitocin. She received an epidural and progressed to complete cervical dilation at 1115. She started pushing at 1224. She had a category II fetal heart tracing with recurrent late decelerations during the final stage of pushing. Description of Procedure: Indication for forceps was suspicion for fetal compromise. Patient was complete and +3 station, baby was direct occiput anterior. Patient was rachelle every 2-3 minutes, FHT was category II with recurrent late decelerations with slow recovery. Patient was felt to have high likelihood for successful operative vaginal delivery. Patient was informed of risks and benefits of the procedure. Risks included, but were not limited to, bleeding, infection, injury to the vagina, cervix, urethra, bladder, rectum, and perineum.  Patient was also counseled on fetal risks including scalp laceration, bruising, subgaleal hematoma, cephalohematoma, intracranial hematoma,  jaundice, facial fractures or facial nerve injury, and shoulder dystocia. All questions were answered and the patient was consented to the procedure. Anesthesia team was notified. Neonatology was notified and present for delivery. The patient is bladder was emptied to avoid injury, adequate anesthesia was noted and the patient was in dorsal lithotomy position. The fetal position was checked and reconfirmed to be direct occiput anterior at +3 station. Mock Olive forceps were used. The left blade was inserted first followed by the right  blade along the occipital diameter and equidistance from the sagittal suture. There was one fingerbreadth between the shanks and the posterior fontanelle. The forceps  were then articulated and traction was applied with start of a contraction. Advancement of the fetal head was noted with traction and only 1 pull was necessary to bring the fetal head to . The forceps were removed from the fetal head. Ritgen's maneuver was performed to aid in delivery of the fetal head. A tight loop of nuchal cord was noted and reduced. The anterior shoulder delivered atraumatically with maternal expulsive forces and the assistance of gentle downward traction. The posterior shoulder delivered with maternal expulsive forces and the assistance of gentle upward traction. The remainder of the fetus delivered spontaneously. Upon delivery, the  was placed on the mother's abdomen and the umbilical cord was doubly clamped and cut. The  was brought to the warmer to be evaluated by Neonatology. There was insufficient sample for the arterial cord gas but venous sample and cord blood were successfully collected. These were sent to the lab. Active management of the third stage of labor included uterine massage and administration of IV Pitocin at 250 lady-units per minute. The uterus was noted to contract down well.  The vagina, cervix, perineum, and rectum were inspected and there was noted to be a second degree perineal laceration. Laceration repair was initiated with 3-0 Vicryl rapide. The placenta was delivered before the laceration repair was completed. Bimanual exam was performed to evacuate clots from the uterus and TXA administered to help minimize blood loss. Due to another bout of increased bleeding, the vagina was reexamined and a left sided vaginal laceration was noted. This was repaired with running locked 3-0 Vicryl rapide and hemostasis achieved. Attention was then turned back to the perineum where the second degree laceration repair was completed with good cosmesis and excellent hemostasis noted at completion of the repair. At the conclusion of the delivery, all needle, sponge, and instrument counts were noted to be correct. The patient tolerated the procedure well and was allowed to recover in labor and delivery room. Cefoxitin was ordered for endometritis prophylaxis. Dr. Becki Boland was present for and participated in key portions of these procedures. Samantha Max MD  PGY-IV, OB/GYN  12/4/2023, 1:57 PM

## 2023-12-04 NOTE — ASSESSMENT & PLAN NOTE
Noted to have elevated blood pressures while in labor  Systolic (85OEK), JGU:902 , Min:119 , AVT:530      Diastolic (72SMX), QOI:16, Min:58, Max:86    CBC/CMP wnl, PC: 0.30

## 2023-12-04 NOTE — PLAN OF CARE
Problem: BIRTH - VAGINAL/ SECTION  Goal: Fetal and maternal status remain reassuring during the birth process  Description: INTERVENTIONS:  - Monitor vital signs  - Monitor fetal heart rate  - Monitor uterine activity  - Monitor labor progression (vaginal delivery)  - DVT prophylaxis  - Antibiotic prophylaxis  Outcome: Progressing  Goal: Emotionally satisfying birthing experience for mother/fetus  Description: Interventions:  - Assess, plan, implement and evaluate the nursing care given to the patient in labor  - Advocate the philosophy that each childbirth experience is a unique experience and support the family's chosen level of involvement and control during the labor process   - Actively participate in both the patient's and family's teaching of the birth process  - Consider cultural, Hinduism and age-specific factors and plan care for the patient in labor  Outcome: Progressing     Problem: Knowledge Deficit  Goal: Verbalizes understanding of labor plan  Description: Assess patient/family/caregiver's baseline knowledge level and ability to understand information. Provide education via patient/family/caregiver's preferred learning method at appropriate level of understanding. 1. Provide teaching at level of understanding. 2. Provide teaching via preferred learning method(s). Outcome: Progressing     Problem: Labor & Delivery  Goal: Manages discomfort  Description: Assess and monitor for signs and symptoms of discomfort. Assess patient's pain level regularly and per hospital policy. Administer medications as ordered. Support use of nonpharmacological methods to help control pain such as distraction, imagery, relaxation, and application of heat and cold. Collaborate with interdisciplinary team and patient to determine appropriate pain management plan. 1. Include patient in decisions related to comfort. 2. Offer non-pharmacological pain management interventions.   3. Report ineffective pain management to physician. Outcome: Progressing  Goal: Patient vital signs are stable  Description: 1. Assess vital signs - vaginal delivery.   Outcome: Progressing

## 2023-12-04 NOTE — OB LABOR/OXYTOCIN SAFETY PROGRESS
Oxytocin Safety Progress Check Note - Padmini Has 43 y.o. female MRN: [id number]    Unit/Bed#: -01 Encounter: [id number]    Dose (saravanan-units/min) Oxytocin: 14 saravanan-units/min  Contraction Frequency (minutes): 2-3  Contraction Quality: Moderate  Tachysystole: No   Cervical Dilation: 10  Dilation Complete Date: 12/04/23  Dilation Complete Time: 1115  Cervical Effacement: 100  Fetal Station: 2  Baseline Rate: 130 bpm  Fetal Heart Rate: 132 BPM  FHR Category: I               Vital Signs:   Vitals:    12/04/23 1100   BP: 135/80   Pulse: (!) 109   Resp:    Temp:    SpO2:        Notes/comments:   Patient is very comfortable with epidural.  Not feeling any contractions or pressure. SVE as above. Patient is waiting for the father of her baby to arrive prior to starting to push. Dr. Selvin Catherine aware.      Omi Sosa MD 12/4/2023 11:15 AM

## 2023-12-04 NOTE — PLAN OF CARE
Problem: POSTPARTUM  Goal: Experiences normal postpartum course  Description: INTERVENTIONS:  - Monitor maternal vital signs  - Assess uterine involution and lochia  Outcome: Progressing  Goal: Appropriate maternal -  bonding  Description: INTERVENTIONS:  - Identify family support  - Assess for appropriate maternal/infant bonding   -Encourage maternal/infant bonding opportunities  - Referral to  or  as needed  Outcome: Progressing  Goal: Establishment of infant feeding pattern  Description: INTERVENTIONS:  - Assess breast/bottle feeding  - Refer to lactation as needed  Outcome: Progressing  Goal: Incision(s), wounds(s) or drain site(s) healing without S/S of infection  Description: INTERVENTIONS  - Assess and document dressing, incision, wound bed, drain sites and surrounding tissue  - Provide patient and family education  Outcome: Progressing     Problem: PAIN - ADULT  Goal: Verbalizes/displays adequate comfort level or baseline comfort level  Description: Interventions:  - Encourage patient to monitor pain and request assistance  - Assess pain using appropriate pain scale  - Administer analgesics based on type and severity of pain and evaluate response  - Implement non-pharmacological measures as appropriate and evaluate response  - Consider cultural and social influences on pain and pain management  - Notify physician/advanced practitioner if interventions unsuccessful or patient reports new pain  Outcome: Progressing     Problem: INFECTION - ADULT  Goal: Absence or prevention of progression during hospitalization  Description: INTERVENTIONS:  - Assess and monitor for signs and symptoms of infection  - Monitor lab/diagnostic results  - Monitor all insertion sites, i.e. indwelling lines, tubes, and drains  - Monitor endotracheal if appropriate and nasal secretions for changes in amount and color  - [place] appropriate cooling/warming therapies per order  - Administer medications as ordered  - Instruct and encourage patient and family to use good hand hygiene technique  - Identify and instruct in appropriate isolation precautions for identified infection/condition  Outcome: Progressing  Goal: Absence of fever/infection during neutropenic period  Description: INTERVENTIONS:  - Monitor WBC    Outcome: Progressing     Problem: SAFETY ADULT  Goal: Patient will remain free of falls  Description: INTERVENTIONS:  - Educate patient/family on patient safety including physical limitations  - Instruct patient to call for assistance with activity   - Consult OT/PT to assist with strengthening/mobility   - Keep Call bell within reach  - Keep bed low and locked with side rails adjusted as appropriate  - Keep care items and personal belongings within reach  - Initiate and maintain comfort rounds  - Make Fall Risk Sign visible to staff  - Apply yellow socks and bracelet for high fall risk patients  - Consider moving patient to room near nurses station  Outcome: Progressing  Goal: Maintain or return to baseline ADL function  Description: INTERVENTIONS:  -  Assess patient's ability to carry out ADLs; assess patient's baseline for ADL function and identify physical deficits which impact ability to perform ADLs (bathing, care of mouth/teeth, toileting, grooming, dressing, etc.)  - Assess/evaluate cause of self-care deficits   - Assess range of motion  - Assess patient's mobility; develop plan if impaired  - Assess patient's need for assistive devices and provide as appropriate  - Encourage maximum independence but intervene and supervise when necessary  - Involve family in performance of ADLs  - Assess for home care needs following discharge   - Consider OT consult to assist with ADL evaluation and planning for discharge  - Provide patient education as appropriate  Outcome: Progressing  Goal: Maintains/Returns to pre admission functional level  Description: INTERVENTIONS:  - Perform AM-PAC 6 Click Basic Mobility/ Daily Activity assessment daily.  - Set and communicate daily mobility goal to care team and patient/family/caregiver. - Collaborate with rehabilitation services on mobility goals if consulted  - Out of bed for toileting  - Record patient progress and toleration of activity level   Outcome: Progressing     Problem: DISCHARGE PLANNING  Goal: Discharge to home or other facility with appropriate resources  Description: INTERVENTIONS:  - Identify barriers to discharge w/patient and caregiver  - Arrange for needed discharge resources and transportation as appropriate  - Identify discharge learning needs (meds, wound care, etc.)  - Arrange for interpretive services to assist at discharge as needed  - Refer to Case Management Department for coordinating discharge planning if the patient needs post-hospital services based on physician/advanced practitioner order or complex needs related to functional status, cognitive ability, or social support system  Outcome: Progressing     Problem: Knowledge Deficit  Goal: Patient/family/caregiver demonstrates understanding of disease process, treatment plan, medications, and discharge instructions  Description: Complete learning assessment and assess knowledge base.   Interventions:  - Provide teaching at level of understanding  - Provide teaching via preferred learning methods  Outcome: Progressing     Problem: ALTERATION IN THE BREAST  Goal: Optimize infant feeding at the breast  Description: INTERVENTIONS:  - Latch, breast and nipple assessment  - Assess prior breast feeding history  - Hand expression of breast milk  - For cracked, bleeding and or sore nipples reassess latch, treat damaged nipple  -Educate mother on feeding cues  -Positioning/latch techniques  Outcome: Progressing     Problem: INADEQUATE LATCH, SUCK OR SWALLOW  Goal: Demonstrate ability to latch and sustain latch, audible swallowing and satiety  Description: INTERVENTIONS:  - Assess oral anatomy, notify Physician/AP for abnormal findings  - Establish milk expression  - Maximize feeding opportunity (skin to skin, behavioral state)  - Position/latch techniques  - Discourage use of pacifier-artificial nipple  - Mechanical pumping  - Nipple Shield  - Supplemental formula feeding (Physician/AP order)  - Alternative feeding method  Outcome: Progressing

## 2023-12-04 NOTE — ANESTHESIA PREPROCEDURE EVALUATION
Procedure:  LABOR ANALGESIA    Relevant Problems   ENDO   (+) Hypothyroidism   (+) Hypothyroidism affecting pregnancy      GYN   (+) 38 weeks gestation of pregnancy   (+) Incidental pregnancy   (+) Multigravida of advanced maternal age in third trimester      NEURO/PSYCH   (+) Anxiety        Physical Exam    Airway    Mallampati score: I         Dental       Cardiovascular      Pulmonary      Other Findings  post-pubertal.      Anesthesia Plan  ASA Score- 2     Anesthesia Type- epidural with ASA Monitors. Additional Monitors:     Airway Plan:            Plan Factors-    Induction-     Postoperative Plan-     Informed Consent-   I personally reviewed this patient with the CRNA. Discussed and agreed on the Anesthesia Plan with the CRNA. Brady Speaker

## 2023-12-04 NOTE — TELEPHONE ENCOUNTER
[id number] water broke  ----- Message from Shantelle Stahl sent at 12/3/2023  7:13 PM EST -----  " My water broke" 38w6 days

## 2023-12-04 NOTE — PLAN OF CARE
Problem: BIRTH - VAGINAL/ SECTION  Goal: Fetal and maternal status remain reassuring during the birth process  Description: INTERVENTIONS:  - Monitor vital signs  - Monitor fetal heart rate  - Monitor uterine activity  - Monitor labor progression (vaginal delivery)  - DVT prophylaxis  - Antibiotic prophylaxis  2023 by Radha Noble RN  Outcome: Adequate for Discharge  2023 by Radha Noble RN  Outcome: Progressing  Goal: Emotionally satisfying birthing experience for mother/fetus  Description: Interventions:  - Assess, plan, implement and evaluate the nursing care given to the patient in labor  - Advocate the philosophy that each childbirth experience is a unique experience and support the family's chosen level of involvement and control during the labor process   - Actively participate in both the patient's and family's teaching of the birth process  - Consider cultural, Church and age-specific factors and plan care for the patient in labor  2023 by Radha Noble RN  Outcome: Adequate for Discharge  2023 by Radha Noble RN  Outcome: Progressing     Problem: Knowledge Deficit  Goal: Verbalizes understanding of labor plan  Description: Assess patient/family/caregiver's baseline knowledge level and ability to understand information. Provide education via patient/family/caregiver's preferred learning method at appropriate level of understanding. 1. Provide teaching at level of understanding. 2. Provide teaching via preferred learning method(s). 2023 by Radha Noble RN  Outcome: Adequate for Discharge  2023 by Radha Noble RN  Outcome: Progressing     Problem: Labor & Delivery  Goal: Manages discomfort  Description: Assess and monitor for signs and symptoms of discomfort. Assess patient's pain level regularly and per hospital policy. Administer medications as ordered.  Support use of nonpharmacological methods to help control pain such as distraction, imagery, relaxation, and application of heat and cold. Collaborate with interdisciplinary team and patient to determine appropriate pain management plan. 1. Include patient in decisions related to comfort. 2. Offer non-pharmacological pain management interventions. 3. Report ineffective pain management to physician. 12/4/2023 1557 by Radha Noble RN  Outcome: Adequate for Discharge  12/4/2023 0734 by Radha Noble RN  Outcome: Progressing  Goal: Patient vital signs are stable  Description: 1. Assess vital signs - vaginal delivery.   12/4/2023 1557 by Radha Noble RN  Outcome: Adequate for Discharge  12/4/2023 0734 by Radha Noble RN  Outcome: Progressing

## 2023-12-04 NOTE — H&P
[facility] 43 y.o. female MRN: [id number]  Unit/Bed#: -01 Encounter: [id number]    Assessment: 43 y.o. [id number] at 38w6d admitted for spontaneous rupture of membranes. SVE: 4/60/-3  Clinical EFW: 63rd percentile ; Cephalic confirmed by ultrasound  GBS status: negative     Plan:   Hypothyroidism affecting pregnancy  Assessment & Plan  Home levothyroxine ordered     38 weeks gestation of pregnancy  Assessment & Plan  Admit to OBN   Clear liquid diet   F/u T&S, CBC, RPR   IVF LR 125cc/hr   Continuous fetal monitoring and tocometry   Analgesia at maternal request   Vertex by TAUS  Expectant management versus possible Pitocin titration            Discussed case and plan w/ Dr. So Ford      Chief Complaint: leaking fluid    HPI: Vaishali Gross is a 43 y.o. [id number] with an NIDHI of 12/11/2023, by Ultrasound at 38w6d who is being admitted for spontaneous rupture of membranes. She denies having uterine contractions, has large amounts of fluid leaking, and reports spotting. She states she has felt good FM. Patient Active Problem List   Diagnosis    Hypothyroidism    Other fatigue    Incidental pregnancy    Anxiety    38 weeks gestation of pregnancy    Multigravida of advanced maternal age in third trimester    Family history of sex chromosome aneuploidy    History of premature delivery, currently pregnant    Maternal care for other (suspected) fetal abnormality and damage, fetal lower extremities anomalies, not applicable or unspecified    Hypothyroidism affecting pregnancy    Encounter for care and examination of lactating mother    Amniotic fluid leaking       Baby complications/comments: none    Review of Systems   Constitutional:  Negative for chills and fever. Respiratory:  Negative for cough, shortness of breath and wheezing. Cardiovascular:  Negative for chest pain and leg swelling.    Gastrointestinal:  Negative for abdominal pain, diarrhea, nausea and vomiting. Genitourinary:  Negative for pelvic pain, vaginal bleeding and vaginal discharge. Musculoskeletal:  Negative for back pain. Neurological:  Negative for weakness, light-headedness and headaches. OB Hx:  OB History    Para Term  AB Living   3 1   1 1 1   SAB IAB Ectopic Multiple Live Births     1     1      # Outcome Date GA Lbr [place]/2nd Weight Sex Delivery Anes PTL Lv   3 Current            2  17 33w4d / 01:00 2195 g (4 lb 13.4 oz) F Vag-Spont EPI Y TEMITOPE      Complications: Placenta Previa, Abruptio Placenta   1 IAB 14 17w0d      N DEC      Complications: Javier's syndrome       Past Medical Hx:  Past Medical History:   Diagnosis Date    Hypothyroidism        Past Surgical hx:  History reviewed. No pertinent surgical history. Social Hx:  Alcohol use: denies  Tobacco use: denies  Other substance use: denies      No Known Allergies    Medications Prior to Admission   Medication    levothyroxine 125 mcg tablet    Prenatal Vit-Fe Fumarate-FA (PRENATAL PO)       Objective:  Temp:  [98.2 °F (36.8 °C)-98.3 °F (36.8 °C)] 98.2 °F (36.8 °C)  HR:  [] 103  BP: (105-144)/(51-76) 144/66  There is no height or weight on file to calculate BMI. Physical Exam:  Physical Exam  Constitutional:       Appearance: Normal appearance. Cardiovascular:      Rate and Rhythm: Normal rate and regular rhythm. Pulmonary:      Effort: Pulmonary effort is normal. No respiratory distress. Abdominal:      Palpations: Abdomen is soft. Tenderness: There is no abdominal tenderness. Musculoskeletal:         General: No swelling or tenderness. Neurological:      General: No focal deficit present. Mental Status: She is alert and oriented to person, place, and time. Skin:     General: Skin is warm and dry. Vitals reviewed.             FHT:  Baseline Rate: 125 bpm  Variability: Moderate 6-25 bpm  Accelerations: 15 x 15 or greater  Decelerations: None  FHR Category: Category I    TOCO:   Contraction Frequency (minutes): 2-5  Contraction Duration (seconds): 50-80  Contraction Quality: Moderate    Lab Results   Component Value Date    WBC 8.04 12/03/2023    HGB 13.5 12/03/2023    HCT 41.6 12/03/2023     12/03/2023     Lab Results   Component Value Date    K 4.4 03/30/2023     (H) 03/30/2023    CO2 24 03/30/2023    BUN 13 03/30/2023    CREATININE 0.78 03/30/2023    AST 20 03/30/2023    ALT 15 03/30/2023     Prenatal Labs: Reviewed      Blood type: O pos  Antibody: Negative  GBS: Negative  HIV: Non-reactive  Rubella: Immune  Syphilis IgM/IgG: Non-reactive  HBsAg: Non-reactive  HCAb: Non-reactive  Chlamydia: Negative  Gonorrhea: Negative  Diabetes 1 hour screen: 128  3 hour glucose: Not indicated  Platelets: 628  Hgb: 13.5  >2 Midnights  INPATIENT     Signature/Title: Viki Kearney MD  Date: 12/4/2023  Time: 3:52 AM

## 2023-12-04 NOTE — OB LABOR/OXYTOCIN SAFETY PROGRESS
Labor Progress Note - Mehdi EMERALD Kennedy 43 y.o. female MRN: [id number]    Unit/Bed#: -01 Encounter: [id number]       Contraction Frequency (minutes): 1-4  Contraction Quality: Moderate  Tachysystole: No   Cervical Dilation: 5        Cervical Effacement: 70  Fetal Station: -3  Baseline Rate: 125 bpm  Fetal Heart Rate: 135 BPM  FHR Category: 1               Vital Signs:   Vitals:    12/04/23 0428   BP: 131/69   Pulse: 90   Temp:    SpO2:        Notes/comments:   SVE as above. Patient is currently comfortable with epidural.   Continue expectant management. Plan for pitocin if  at next check.    Discussed with Dr. Nicloe Jorge MD 12/4/2023 4:44 AM

## 2023-12-04 NOTE — ANESTHESIA POSTPROCEDURE EVALUATION
Post-Op Assessment Note    CV Status:  Stable  Pain Score: 0    Pain management: adequate      Post-op block assessment: no complications, site cleaned and catheter intact   Mental Status:  Alert and awake   Hydration Status:  Euvolemic   PONV Controlled:  Controlled   Airway Patency:  Patent     Post Op Vitals Reviewed: Yes    No anethesia notable event occurred.     Staff: CRNA           BP      Temp      Pulse     Resp      SpO2

## 2023-12-04 NOTE — OB LABOR/OXYTOCIN SAFETY PROGRESS
Oxytocin Safety Progress Check Note - Natha Notice 43 y.o. female MRN: [id number]    Unit/Bed#: -01 Encounter: [id number]    Dose (saravanan-units/min) Oxytocin: 8 saravanan-units/min  Contraction Frequency (minutes): 3-5  Contraction Quality: Moderate  Tachysystole: No   Cervical Dilation: 5        Cervical Effacement: 70  Fetal Station: -2  Baseline Rate: 130 bpm  Fetal Heart Rate: 132 BPM  FHR Category: I               Vital Signs:   Vitals:    12/04/23 0858   BP: 123/65   Pulse: 84   Resp:    Temp:    SpO2:        Notes/comments:   Patient resting comfortably. Her epidural is working well. SVE as above with no change. FHT remains category 1. Continue Pitocin titration. Dr. Allie Chavez aware.      Sendy Stack MD 12/4/2023 9:08 AM

## 2023-12-04 NOTE — TELEPHONE ENCOUNTER
Reason for Disposition  • Leakage of fluid from vagina    Answer Assessment - Initial Assessment Questions  1. ONSET: "When did the symptoms begin?"         Pt's water broke 7:15 PM    2. CONTRACTIONS: "Are you having any contractions?" If yes, ask: "Describe the contractions that you are having." (e.g., duration, frequency, regularity, severity)      No contractions    3. NIDHI: "What date are you expecting to deliver?"      12/11/23    4. PARITY: "Have you had a baby before?" If Yes, ask: "How long did the labor last?"      Yes; last labor was induction and baby was premature    5. FETAL MOVEMENT: "Has the baby's movement decreased or changed significantly from normal?"      Slight decrease    6.  OTHER SYMPTOMS: "Do you have any other symptoms?" (e.g., abdominal pain, vaginal bleeding, fever, hand/facial swelling)      Denies    Protocols used: Pregnancy - Rupture of Membranes-ADULT-

## 2023-12-04 NOTE — OB LABOR/OXYTOCIN SAFETY PROGRESS
Labor Progress Note - Mehdi EMERALD Jerome 43 y.o. female MRN: [id number]    Unit/Bed#:  201-01 Encounter: [id number]       Contraction Frequency (minutes): irritability  Contraction Quality: Moderate  Tachysystole: No   Cervical Dilation: 5        Cervical Effacement: 70  Fetal Station: -3  Baseline Rate: 120 bpm  Fetal Heart Rate: 128 BPM  FHR Category: 1               Vital Signs:   Vitals:    12/04/23 0628   BP: 130/64   Pulse: 97   Temp:    SpO2:        Notes/comments:   HAILEY villagomez, will start [address] [facility] MD Pavel 12/4/2023 6:41 AM

## 2023-12-04 NOTE — ANESTHESIA PROCEDURE NOTES
Epidural Block    Patient location during procedure: OB/L&D  Start time: 12/4/2023 2:41 AM  Reason for block: procedure for pain  Staffing  Performed by: Zafar Alvarado CRNA  Authorized by: Rayne Amos MD    Preanesthetic Checklist  Completed: patient identified, IV checked, site marked, risks and benefits discussed, surgical consent, monitors and equipment checked, pre-op evaluation and timeout performed  Epidural  Patient position: sitting  Prep: ChloraPrep  Sedation Level: no sedation  Patient monitoring: frequent blood pressure checks, continuous pulse oximetry and heart rate  Approach: midline  Location: lumbar, L3-4  Injection technique: BRENT saline  Needle  Needle type: Tuohy   Needle gauge: 18 G  Needle insertion depth: 6 cm  Catheter type: multi-orifice  Catheter size: 20 G  Catheter at skin depth: 12 cm  Catheter securement method: stabilization device and clear occlusive dressing  Test dose: negativelidocaine-epinephrine (XYLOCAINE-MPF/EPINEPHRINE) 1.5 %-1:200,000 injection 3 mL - Epidural   5 mL - 12/4/2023 2:42:00 AM  Assessment  Sensory level: T10  Number of attempts: 1negative aspiration for CSF, negative aspiration for heme and no paresthesia on injection  patient tolerated the procedure well with no immediate complications

## 2023-12-04 NOTE — OB LABOR/OXYTOCIN SAFETY PROGRESS
Labor Progress Note - Venelis I Rasheed Pod 43 y.o. female MRN: [id number]    Unit/Bed#: -01 Encounter: [id number]       Contraction Frequency (minutes): 5  Contraction Quality: Mild  Tachysystole: No   Cervical Dilation: 4        Cervical Effacement: 60  Fetal Station: -3  Baseline Rate: 115 bpm  Fetal Heart Rate: 156 BPM  FHR Category: 1               Vital Signs:   Vitals:    12/03/23 2300   BP:    Pulse:    Temp: 98.2 °F (36.8 °C)   SpO2:        Notes/comments:   SVE as above. Patient is currently comfortable without epidural.  Continue expectant management.   Discussed with Dr. Rina Desai MD 12/4/2023 12:20 AM

## 2023-12-04 NOTE — PLAN OF CARE
Problem: BIRTH - VAGINAL/ SECTION  Goal: Fetal and maternal status remain reassuring during the birth process  Description: INTERVENTIONS:  - Monitor vital signs  - Monitor fetal heart rate  - Monitor uterine activity  - Monitor labor progression (vaginal delivery)  - DVT prophylaxis  - Antibiotic prophylaxis  Outcome: Progressing  Goal: Emotionally satisfying birthing experience for mother/fetus  Description: Interventions:  - Assess, plan, implement and evaluate the nursing care given to the patient in labor  - Advocate the philosophy that each childbirth experience is a unique experience and support the family's chosen level of involvement and control during the labor process   - Actively participate in both the patient's and family's teaching of the birth process  - Consider cultural, Episcopal and age-specific factors and plan care for the patient in labor  Outcome: Progressing     Problem: Knowledge Deficit  Goal: Verbalizes understanding of labor plan  Description: Assess patient/family/caregiver's baseline knowledge level and ability to understand information. Provide education via patient/family/caregiver's preferred learning method at appropriate level of understanding. 1. Provide teaching at level of understanding. 2. Provide teaching via preferred learning method(s). Outcome: Progressing     Problem: Labor & Delivery  Goal: Manages discomfort  Description: Assess and monitor for signs and symptoms of discomfort. Assess patient's pain level regularly and per hospital policy. Administer medications as ordered. Support use of nonpharmacological methods to help control pain such as distraction, imagery, relaxation, and application of heat and cold. Collaborate with interdisciplinary team and patient to determine appropriate pain management plan. 1. Include patient in decisions related to comfort. 2. Offer non-pharmacological pain management interventions.   3. Report ineffective pain management to physician. Outcome: Progressing  Goal: Patient vital signs are stable  Description: 1. Assess vital signs - vaginal delivery.   Outcome: Progressing

## 2023-12-04 NOTE — PROGRESS NOTES
Present at the bedside due to patient increased amount of pain. She is having significant lower pelvic pain and perineal pain. On exam, she is tender but without evidence of hematoma. Bleeding is minimal. Plan to give 5mg of kristy given recent administration of motrin.      [employer], MD  OBGYN PGY-3  12/04/23 3:04 PM

## 2023-12-05 VITALS
HEIGHT: 66 IN | RESPIRATION RATE: 20 BRPM | SYSTOLIC BLOOD PRESSURE: 127 MMHG | DIASTOLIC BLOOD PRESSURE: 72 MMHG | WEIGHT: 238.1 LBS | OXYGEN SATURATION: 98 % | TEMPERATURE: 98.5 F | HEART RATE: 91 BPM | BODY MASS INDEX: 38.27 KG/M2

## 2023-12-05 PROBLEM — O09.899 HISTORY OF PREMATURE DELIVERY, CURRENTLY PREGNANT: Status: RESOLVED | Noted: 2023-06-16 | Resolved: 2023-12-05

## 2023-12-05 PROBLEM — O14.90 PREECLAMPSIA: Status: ACTIVE | Noted: 2023-12-04

## 2023-12-05 PROBLEM — Z33.1 INCIDENTAL PREGNANCY: Status: RESOLVED | Noted: 2023-04-10 | Resolved: 2023-12-05

## 2023-12-05 PROBLEM — O09.523 MULTIGRAVIDA OF ADVANCED MATERNAL AGE IN THIRD TRIMESTER: Status: RESOLVED | Noted: 2023-06-09 | Resolved: 2023-12-05

## 2023-12-05 PROBLEM — O35.HXX0: Status: RESOLVED | Noted: 2023-08-01 | Resolved: 2023-12-05

## 2023-12-05 PROCEDURE — 99024 POSTOP FOLLOW-UP VISIT: CPT | Performed by: STUDENT IN AN ORGANIZED HEALTH CARE EDUCATION/TRAINING PROGRAM

## 2023-12-05 PROCEDURE — NC001 PR NO CHARGE: Performed by: STUDENT IN AN ORGANIZED HEALTH CARE EDUCATION/TRAINING PROGRAM

## 2023-12-05 RX ORDER — DOCUSATE SODIUM 100 MG/1
100 CAPSULE, LIQUID FILLED ORAL 2 TIMES DAILY
Refills: 0
Start: 2023-12-05

## 2023-12-05 RX ORDER — IBUPROFEN 600 MG/1
600 TABLET ORAL EVERY 6 HOURS PRN
Qty: 30 TABLET | Refills: 0 | Status: SHIPPED | OUTPATIENT
Start: 2023-12-05 | End: 2024-01-04

## 2023-12-05 RX ORDER — ACETAMINOPHEN 325 MG/1
650 TABLET ORAL EVERY 4 HOURS PRN
Refills: 0
Start: 2023-12-05

## 2023-12-05 RX ORDER — DIAPER,BRIEF,INFANT-TODD,DISP
1 EACH MISCELLANEOUS DAILY PRN
Refills: 0
Start: 2023-12-05

## 2023-12-05 RX ADMIN — SENNOSIDES 8.6 MG: 8.6 TABLET, FILM COATED ORAL at 08:59

## 2023-12-05 RX ADMIN — DOCUSATE SODIUM 100 MG: 100 CAPSULE, LIQUID FILLED ORAL at 08:58

## 2023-12-05 RX ADMIN — LEVOTHYROXINE SODIUM 125 MCG: 125 TABLET ORAL at 06:27

## 2023-12-05 RX ADMIN — IBUPROFEN 600 MG: 600 TABLET, FILM COATED ORAL at 01:03

## 2023-12-05 RX ADMIN — IBUPROFEN 600 MG: 600 TABLET, FILM COATED ORAL at 08:59

## 2023-12-05 NOTE — PLAN OF CARE
Problem: POSTPARTUM  Goal: Experiences normal postpartum course  Description: INTERVENTIONS:  - Monitor maternal vital signs  - Assess uterine involution and lochia  Outcome: Completed  Goal: Appropriate maternal -  bonding  Description: INTERVENTIONS:  - Identify family support  - Assess for appropriate maternal/infant bonding   -Encourage maternal/infant bonding opportunities  - Referral to  or  as needed  Outcome: Completed  Goal: Establishment of infant feeding pattern  Description: INTERVENTIONS:  - Assess breast/bottle feeding  - Refer to lactation as needed  Outcome: Completed  Goal: Incision(s), wounds(s) or drain site(s) healing without S/S of infection  Description: INTERVENTIONS  - Assess and document dressing, incision, wound bed, drain sites and surrounding tissue  - Provide patient and family education  - Perform skin care/dressing changes every   Outcome: Completed     Problem: PAIN - ADULT  Goal: Verbalizes/displays adequate comfort level or baseline comfort level  Description: Interventions:  - Encourage patient to monitor pain and request assistance  - Assess pain using appropriate pain scale  - Administer analgesics based on type and severity of pain and evaluate response  - Implement non-pharmacological measures as appropriate and evaluate response  - Consider cultural and social influences on pain and pain management  - Notify physician/advanced practitioner if interventions unsuccessful or patient reports new pain  Outcome: Completed     Problem: INFECTION - ADULT  Goal: Absence or prevention of progression during hospitalization  Description: INTERVENTIONS:  - Assess and monitor for signs and symptoms of infection  - Monitor lab/diagnostic results  - Monitor all insertion sites, i.e. indwelling lines, tubes, and drains  - Monitor endotracheal if appropriate and nasal secretions for changes in amount and color  - [place] appropriate cooling/warming therapies per order  - Administer medications as ordered  - Instruct and encourage patient and family to use good hand hygiene technique  - Identify and instruct in appropriate isolation precautions for identified infection/condition  Outcome: Completed  Goal: Absence of fever/infection during neutropenic period  Description: INTERVENTIONS:  - Monitor WBC    Outcome: Completed     Problem: SAFETY ADULT  Goal: Patient will remain free of falls  Description: INTERVENTIONS:  - Educate patient/family on patient safety including physical limitations  - Instruct patient to call for assistance with activity   - Consult OT/PT to assist with strengthening/mobility   - Keep Call bell within reach  - Keep bed low and locked with side rails adjusted as appropriate  - Keep care items and personal belongings within reach  - Initiate and maintain comfort rounds  - Make Fall Risk Sign visible to staff  - Offer Toileting every  Hours, in advance of need  - Initiate/Maintain alarm  - Obtain necessary fall risk management equipment:   - Apply yellow socks and bracelet for high fall risk patients  - Consider moving patient to room near nurses station  Outcome: Completed  Goal: Maintain or return to baseline ADL function  Description: INTERVENTIONS:  -  Assess patient's ability to carry out ADLs; assess patient's baseline for ADL function and identify physical deficits which impact ability to perform ADLs (bathing, care of mouth/teeth, toileting, grooming, dressing, etc.)  - Assess/evaluate cause of self-care deficits   - Assess range of motion  - Assess patient's mobility; develop plan if impaired  - Assess patient's need for assistive devices and provide as appropriate  - Encourage maximum independence but intervene and supervise when necessary  - Involve family in performance of ADLs  - Assess for home care needs following discharge   - Consider OT consult to assist with ADL evaluation and planning for discharge  - Provide patient education as appropriate  Outcome: Completed  Goal: Maintains/Returns to pre admission functional level  Description: INTERVENTIONS:  - Perform AM-PAC 6 Click Basic Mobility/ Daily Activity assessment daily.  - Set and communicate daily mobility goal to care team and patient/family/caregiver. - Collaborate with rehabilitation services on mobility goals if consulted  - Perform Range of Motion  times a day. - Reposition patient every  hours. - Dangle patient  times a day  - Stand patient  times a day  - Ambulate patient  times a day  - Out of bed to chair  times a day   - Out of bed for meals  times a day  - Out of bed for toileting  - Record patient progress and toleration of activity level   Outcome: Completed     Problem: DISCHARGE PLANNING  Goal: Discharge to home or other facility with appropriate resources  Description: INTERVENTIONS:  - Identify barriers to discharge w/patient and caregiver  - Arrange for needed discharge resources and transportation as appropriate  - Identify discharge learning needs (meds, wound care, etc.)  - Arrange for interpretive services to assist at discharge as needed  - Refer to Case Management Department for coordinating discharge planning if the patient needs post-hospital services based on physician/advanced practitioner order or complex needs related to functional status, cognitive ability, or social support system  Outcome: Completed     Problem: Knowledge Deficit  Goal: Patient/family/caregiver demonstrates understanding of disease process, treatment plan, medications, and discharge instructions  Description: Complete learning assessment and assess knowledge base.   Interventions:  - Provide teaching at level of understanding  - Provide teaching via preferred learning methods  Outcome: Completed     Problem: ALTERATION IN THE BREAST  Goal: Optimize infant feeding at the breast  Description: INTERVENTIONS:  - Latch, breast and nipple assessment  - Assess prior breast feeding history  - Hand expression of breast milk  - For cracked, bleeding and or sore nipples reassess latch, treat damaged nipple  -Educate mother on feeding cues  -Positioning/latch techniques  Outcome: Completed     Problem: INADEQUATE LATCH, SUCK OR SWALLOW  Goal: Demonstrate ability to latch and sustain latch, audible swallowing and satiety  Description: INTERVENTIONS:  - Assess oral anatomy, notify Physician/AP for abnormal findings  - Establish milk expression  - Maximize feeding opportunity (skin to skin, behavioral state)  - Position/latch techniques  - Discourage use of pacifier-artificial nipple  - Mechanical pumping  - Nipple Shield  - Supplemental formula feeding (Physician/AP order)  - Alternative feeding method  Outcome: Completed

## 2023-12-05 NOTE — PROGRESS NOTES
Progress Note - OB/GYN  Catrachita Rosa 43 y.o. female MRN: [id number]  Unit/Bed#: -01 Encounter: [id number]    Assessment and Plan     Mehdi EMERALD Wyatt is a patient of: [address] [place] [address]. She is PPD# 1 s/p  forceps-assisted vaginal delivery  Recovering well and is stable       Preeclampsia  Assessment & Plan  Noted to have elevated blood pressures while in labor  Systolic (32EMR), JRV:600 , Min:119 , RGD:631      Diastolic (89KST), RNP:15, Min:58, Max:86    CBC/CMP wnl, PC: 0.30          Hypothyroidism affecting pregnancy  Assessment & Plan  Home levothyroxine ordered     * Outlet forceps delivery  Assessment & Plan  Lochia WNL   Recovering well   Appropriate bowel and bladder function   Pain well controlled   Tolerating diet   Breastfeeding   Ambulating without issues   No lower extremity tenderness  GBS neg   Rh pos             Disposition    - Anticipate discharge home today vs tomorrow      Subjective/Objective     Chief Complaint: Postpartum State     Subjective:    Unique Pittman EMERALD Wyatt is PPD#1 s/p  forcep-assisted vaginal delivery. She has no current complaints. Pain is well controlled. Patient is currently voiding. She is ambulating. Patient is currently passing flatus and has had no bowel movement. She is tolerating PO, and denies nausea or vomitting. Patient denies fever, chills, chest pain, shortness of breath, or calf tenderness. Lochia is normal. She is  Breastfeeding. She is recovering well and is stable.        Vitals:   /69 (BP Location: Left arm)   Pulse 97   Temp 98.3 °F (36.8 °C) (Oral)   Resp 20   Ht 5' 5.98" (1.676 m)   Wt 108 kg (238 lb 1.6 oz)   LMP 03/14/2023   SpO2 100%   Breastfeeding Yes   BMI 38.45 kg/m²       Intake/Output Summary (Last 24 hours) at 12/5/2023 0648  Last data filed at 12/5/2023 0422  Gross per 24 hour   Intake 90.35 ml   Output 3124 ml   Net -3033.65 ml       Invasive Devices       Peripheral Intravenous Line Duration             Peripheral IV 12/03/23 Left Forearm 1 day                    Physical Exam:   GEN: Casey Garner appears well, alert, pleasant and cooperative   CARDIO: RRR  RESP:  Normal respiratory effort  ABDOMEN: Soft, no tenderness, no distention, fundus firm  EXTREMITIES: Non tender, no erythema, b/l Young's sign negative      Labs:     Hemoglobin   Date Value Ref Range Status   12/04/2023 12.4 11.5 - 15.4 g/dL Final   12/03/2023 13.5 11.5 - 15.4 g/dL Final     WBC   Date Value Ref Range Status   12/04/2023 19.05 (H) 4.31 - 10.16 Thousand/uL Final   12/03/2023 8.04 4.31 - 10.16 Thousand/uL Final     Platelets   Date Value Ref Range Status   12/04/2023 165 149 - 390 Thousands/uL Final   12/03/2023 176 149 - 390 Thousands/uL Final     Creatinine   Date Value Ref Range Status   12/04/2023 0.46 (L) 0.60 - 1.30 mg/dL Final     Comment:     Standardized to IDMS reference method   12/03/2023 0.50 (L) 0.60 - 1.30 mg/dL Final     Comment:     Standardized to IDMS reference method     AST   Date Value Ref Range Status   12/04/2023 25 13 - 39 U/L Final   12/03/2023 22 13 - 39 U/L Final     ALT   Date Value Ref Range Status   12/04/2023 17 7 - 52 U/L Final     Comment:     Specimen collection should occur prior to Sulfasalazine administration due to the potential for falsely depressed results. 12/03/2023 20 7 - 52 U/L Final     Comment:     Specimen collection should occur prior to Sulfasalazine administration due to the potential for falsely depressed results.            Josse Bradford MD  OBGYN PGY2  12/5/2023  6:48 AM

## 2023-12-05 NOTE — LACTATION NOTE
This note was copied from a baby's chart. CONSULT - LACTATION  Baby Boy (Venelis) Bert Ellis 1 days male MRN: [id number]    [address] [facility] NURSERY Room / Bed: (N)/(N) Encounter: [id number]    Maternal Information     MOTHER:  Ashley Amaya I  Maternal Age: 43 y.o.   OB History: # 1 - Date: 14, Sex: None, Weight: None, GA: 17w0d, Delivery: None, Apgar1: None, Apgar5: None, Living: , Birth Comments: None    # 2 - Date: 17, Sex: Female, Weight: 2195 g (4 lb 13.4 oz), GA: 33w4d, Delivery: Vaginal, Spontaneous, Apgar1: 8, Apgar5: 9, Living: Living, Birth Comments: None    # 3 - Date: 23, Sex: Male, Weight: 3895 g (8 lb 9.4 oz), GA: 39w0d, Delivery: Vaginal, Spontaneous, Apgar1: 8, Apgar5: 9, Living: Living, Birth Comments: None   Previouse breast reduction surgery? No    Lactation history:   Has patient previously breast fed: How long had patient previously breast fed:     Previous breast feeding complications:     History reviewed. No pertinent surgical history.      Birth information:  YOB: 2024   Time of birth: 1:10 PM   Sex: male   Delivery type: Vaginal, Spontaneous   Birth Weight: 3895 g (8 lb 9.4 oz)   Percent of Weight Change: -1%     Gestational Age: 39w0d   [unfilled]    Assessment     Breast and nipple assessment: sore nipple     Assessment: normal assessment    Feeding assessment: feeding well  LATCH:  Latch: Grasps breast, tongue down, lips flanged, rhythmic sucking   Audible Swallowing: Spontaneous and intermittent (24 hours old)   Type of Nipple: Everted (After stimulation)   Comfort (Breast/Nipple): Filling, red/small blisters/bruises, mild/moderate discomfort   Hold (Positioning): Partial assist, teach one side, mother does other, staff holds   [facility] Score: 8           23 0800   Lactation Consultation   Reason for Consult 20;20 min   LATCH Documentation   Latch 2   Audible Swallowing 2 Type of Nipple 2   Comfort (Breast/Nipple) 1   Hold (Positioning) 1   LATCH Score 8   Having latch problems? No   Position(s) Used Cross Cradle   Breasts/Nipples   Left Breast Soft   Right Breast Soft   Left Nipple Sore;Everted   Right Nipple Sore;Everted   Intervention Hand expression; Hydrogel pads   Breastfeeding Progress Not yet established   Breast Pump   Pump 3  (Has pump for home)   Pump Review/Education Setup, frequency, and cleaning;Milk storage   Patient Follow-Up   Lactation Consult Status 2   Follow-Up Type Inpatient;Call as needed   Other OB Lactation Documentation    Additional Problem Noted Mom called out for help in latching baby to breast. Demonstration with teach back of proper position and alignment for a deep latch. Mom c/o nipple soreness. No cracks or bleeding. Hydrogel pads given with instructions. (RSB and D/C booklet reviewed.)     Feeding recommendations:  breast feed on demand    Provided demonstration, education and support of deep latch to breast by placing the nipple to the nose, dragging down to chin to achieve a wide latch. Bring baby to the breast, not breast to baby. Move your shoulders down and away from your ears. Look for ear, shoulder, hip alignment. Baby's upper and lower lip should be flanged on the breast.     C/O sore nipples. Information given about sore nipples and how to correct with positioning techniques. Discussed maneuvers to latch infant on properly to avoid nipple pain and promote healing. Discussed treatments that could be utilized to promote healing. Hydro gel dressings given with instruction and verbalization of understanding of cleaning and duration of use. Met with mother. Provided mother with Ready, Set, Baby booklet which contained information on:  Hand expression with access to QR codes to review hand expression. Positioning and latch reviewed as well as showing images of other feeding positions.   Discussed the properties of a good latch in any position. Feeding on cue and what that means for recognizing infant's hunger, s/s that baby is getting enough milk and some s/s that breastfeeding dyad may need further help  Skin to Skin contact an benefits to mom and baby  Avoidance of pacifiers for the first month discussed. Gave information on common concerns, what to expect the first few weeks after delivery, preparing for other caregivers, and how partners can help. Resources for support also provided.     Venu Sanches 12/5/2023 8:38 AM

## 2023-12-05 NOTE — PLAN OF CARE
Problem: POSTPARTUM  Goal: Experiences normal postpartum course  Description: INTERVENTIONS:  - Monitor maternal vital signs  - Assess uterine involution and lochia  Outcome: Progressing  Goal: Appropriate maternal -  bonding  Description: INTERVENTIONS:  - Identify family support  - Assess for appropriate maternal/infant bonding   -Encourage maternal/infant bonding opportunities  - Referral to  or  as needed  Outcome: Progressing  Goal: Establishment of infant feeding pattern  Description: INTERVENTIONS:  - Assess breast/bottle feeding  - Refer to lactation as needed  Outcome: Progressing  Goal: Incision(s), wounds(s) or drain site(s) healing without S/S of infection  Description: INTERVENTIONS  - Assess and document dressing, incision, wound bed, drain sites and surrounding tissue  - Provide patient and family education  - Perform skin care/dressing changes every   Outcome: Progressing     Problem: PAIN - ADULT  Goal: Verbalizes/displays adequate comfort level or baseline comfort level  Description: Interventions:  - Encourage patient to monitor pain and request assistance  - Assess pain using appropriate pain scale  - Administer analgesics based on type and severity of pain and evaluate response  - Implement non-pharmacological measures as appropriate and evaluate response  - Consider cultural and social influences on pain and pain management  - Notify physician/advanced practitioner if interventions unsuccessful or patient reports new pain  Outcome: Progressing     Problem: INFECTION - ADULT  Goal: Absence or prevention of progression during hospitalization  Description: INTERVENTIONS:  - Assess and monitor for signs and symptoms of infection  - Monitor lab/diagnostic results  - Monitor all insertion sites, i.e. indwelling lines, tubes, and drains  - Monitor endotracheal if appropriate and nasal secretions for changes in amount and color  - [place] appropriate cooling/warming therapies per order  - Administer medications as ordered  - Instruct and encourage patient and family to use good hand hygiene technique  - Identify and instruct in appropriate isolation precautions for identified infection/condition  Outcome: Progressing  Goal: Absence of fever/infection during neutropenic period  Description: INTERVENTIONS:  - Monitor WBC    Outcome: Progressing     Problem: SAFETY ADULT  Goal: Patient will remain free of falls  Description: INTERVENTIONS:  - Educate patient/family on patient safety including physical limitations  - Instruct patient to call for assistance with activity   - Consult OT/PT to assist with strengthening/mobility   - Keep Call bell within reach  - Keep bed low and locked with side rails adjusted as appropriate  - Keep care items and personal belongings within reach  - Initiate and maintain comfort rounds  - Make Fall Risk Sign visible to staff  - Offer Toileting every  Hours, in advance of need  - Initiate/Maintain alarm  - Obtain necessary fall risk management equipment:   - Apply yellow socks and bracelet for high fall risk patients  - Consider moving patient to room near nurses station  Outcome: Progressing  Goal: Maintain or return to baseline ADL function  Description: INTERVENTIONS:  -  Assess patient's ability to carry out ADLs; assess patient's baseline for ADL function and identify physical deficits which impact ability to perform ADLs (bathing, care of mouth/teeth, toileting, grooming, dressing, etc.)  - Assess/evaluate cause of self-care deficits   - Assess range of motion  - Assess patient's mobility; develop plan if impaired  - Assess patient's need for assistive devices and provide as appropriate  - Encourage maximum independence but intervene and supervise when necessary  - Involve family in performance of ADLs  - Assess for home care needs following discharge   - Consider OT consult to assist with ADL evaluation and planning for discharge  - Provide patient education as appropriate  Outcome: Progressing  Goal: Maintains/Returns to pre admission functional level  Description: INTERVENTIONS:  - Perform AM-PAC 6 Click Basic Mobility/ Daily Activity assessment daily.  - Set and communicate daily mobility goal to care team and patient/family/caregiver. - Collaborate with rehabilitation services on mobility goals if consulted  - Perform Range of Motion  times a day. - Reposition patient every  hours. - Dangle patient  times a day  - Stand patient  times a day  - Ambulate patient  times a day  - Out of bed to chair  times a day   - Out of bed for meals  times a day  - Out of bed for toileting  - Record patient progress and toleration of activity level   Outcome: Progressing     Problem: DISCHARGE PLANNING  Goal: Discharge to home or other facility with appropriate resources  Description: INTERVENTIONS:  - Identify barriers to discharge w/patient and caregiver  - Arrange for needed discharge resources and transportation as appropriate  - Identify discharge learning needs (meds, wound care, etc.)  - Arrange for interpretive services to assist at discharge as needed  - Refer to Case Management Department for coordinating discharge planning if the patient needs post-hospital services based on physician/advanced practitioner order or complex needs related to functional status, cognitive ability, or social support system  Outcome: Progressing     Problem: Knowledge Deficit  Goal: Patient/family/caregiver demonstrates understanding of disease process, treatment plan, medications, and discharge instructions  Description: Complete learning assessment and assess knowledge base.   Interventions:  - Provide teaching at level of understanding  - Provide teaching via preferred learning methods  Outcome: Progressing     Problem: ALTERATION IN THE BREAST  Goal: Optimize infant feeding at the breast  Description: INTERVENTIONS:  - Latch, breast and nipple assessment  - Assess prior breast feeding history  - Hand expression of breast milk  - For cracked, bleeding and or sore nipples reassess latch, treat damaged nipple  -Educate mother on feeding cues  -Positioning/latch techniques  Outcome: Progressing     Problem: INADEQUATE LATCH, SUCK OR SWALLOW  Goal: Demonstrate ability to latch and sustain latch, audible swallowing and satiety  Description: INTERVENTIONS:  - Assess oral anatomy, notify Physician/AP for abnormal findings  - Establish milk expression  - Maximize feeding opportunity (skin to skin, behavioral state)  - Position/latch techniques  - Discourage use of pacifier-artificial nipple  - Mechanical pumping  - Nipple Shield  - Supplemental formula feeding (Physician/AP order)  - Alternative feeding method  Outcome: Progressing

## 2023-12-05 NOTE — PLAN OF CARE
Problem: POSTPARTUM  Goal: Experiences normal postpartum course  Description: INTERVENTIONS:  - Monitor maternal vital signs  - Assess uterine involution and lochia  Outcome: Progressing  Goal: Appropriate maternal -  bonding  Description: INTERVENTIONS:  - Identify family support  - Assess for appropriate maternal/infant bonding   -Encourage maternal/infant bonding opportunities  - Referral to  or  as needed  Outcome: Progressing  Goal: Establishment of infant feeding pattern  Description: INTERVENTIONS:  - Assess breast/bottle feeding  - Refer to lactation as needed  Outcome: Progressing  Goal: Incision(s), wounds(s) or drain site(s) healing without S/S of infection  Description: INTERVENTIONS  - Assess and document dressing, incision, wound bed, drain sites and surrounding tissue  - Provide patient and family education  - Perform skin care/dressing changes  Outcome: Progressing     Problem: PAIN - ADULT  Goal: Verbalizes/displays adequate comfort level or baseline comfort level  Description: Interventions:  - Encourage patient to monitor pain and request assistance  - Assess pain using appropriate pain scale  - Administer analgesics based on type and severity of pain and evaluate response  - Implement non-pharmacological measures as appropriate and evaluate response  - Consider cultural and social influences on pain and pain management  - Notify physician/advanced practitioner if interventions unsuccessful or patient reports new pain  Outcome: Progressing     Problem: INFECTION - ADULT  Goal: Absence or prevention of progression during hospitalization  Description: INTERVENTIONS:  - Assess and monitor for signs and symptoms of infection  - Monitor lab/diagnostic results  - Monitor all insertion sites, i.e. indwelling lines, tubes, and drains  - Monitor endotracheal if appropriate and nasal secretions for changes in amount and color  - [place] appropriate cooling/warming therapies per order  - Administer medications as ordered  - Instruct and encourage patient and family to use good hand hygiene technique  - Identify and instruct in appropriate isolation precautions for identified infection/condition  Outcome: Progressing  Goal: Absence of fever/infection during neutropenic period  Description: INTERVENTIONS:  - Monitor WBC    Outcome: Progressing     Problem: SAFETY ADULT  Goal: Patient will remain free of falls  Description: INTERVENTIONS:  - Educate patient/family on patient safety including physical limitations  - Instruct patient to call for assistance with activity   - Consult OT/PT to assist with strengthening/mobility   - Keep Call bell within reach  - Keep bed low and locked with side rails adjusted as appropriate  - Keep care items and personal belongings within reach  - Initiate and maintain comfort rounds  - Make Fall Risk Sign visible to staff  - Apply yellow socks and bracelet for high fall risk patients  - Consider moving patient to room near nurses station  Outcome: Progressing  Goal: Maintain or return to baseline ADL function  Description: INTERVENTIONS:  -  Assess patient's ability to carry out ADLs; assess patient's baseline for ADL function and identify physical deficits which impact ability to perform ADLs (bathing, care of mouth/teeth, toileting, grooming, dressing, etc.)  - Assess/evaluate cause of self-care deficits   - Assess range of motion  - Assess patient's mobility; develop plan if impaired  - Assess patient's need for assistive devices and provide as appropriate  - Encourage maximum independence but intervene and supervise when necessary  - Involve family in performance of ADLs  - Assess for home care needs following discharge   - Consider OT consult to assist with ADL evaluation and planning for discharge  - Provide patient education as appropriate  Outcome: Progressing  Goal: Maintains/Returns to pre admission functional level  Description: INTERVENTIONS:  - Perform AM-PAC 6 Click Basic Mobility/ Daily Activity assessment daily.  - Set and communicate daily mobility goal to care team and patient/family/caregiver. - Collaborate with rehabilitation services on mobility goals if consulted  - Out of bed for toileting  - Record patient progress and toleration of activity level   Outcome: Progressing     Problem: DISCHARGE PLANNING  Goal: Discharge to home or other facility with appropriate resources  Description: INTERVENTIONS:  - Identify barriers to discharge w/patient and caregiver  - Arrange for needed discharge resources and transportation as appropriate  - Identify discharge learning needs (meds, wound care, etc.)  - Arrange for interpretive services to assist at discharge as needed  - Refer to Case Management Department for coordinating discharge planning if the patient needs post-hospital services based on physician/advanced practitioner order or complex needs related to functional status, cognitive ability, or social support system  Outcome: Progressing     Problem: Knowledge Deficit  Goal: Patient/family/caregiver demonstrates understanding of disease process, treatment plan, medications, and discharge instructions  Description: Complete learning assessment and assess knowledge base.   Interventions:  - Provide teaching at level of understanding  - Provide teaching via preferred learning methods  Outcome: Progressing     Problem: ALTERATION IN THE BREAST  Goal: Optimize infant feeding at the breast  Description: INTERVENTIONS:  - Latch, breast and nipple assessment  - Assess prior breast feeding history  - Hand expression of breast milk  - For cracked, bleeding and or sore nipples reassess latch, treat damaged nipple  -Educate mother on feeding cues  -Positioning/latch techniques  Outcome: Progressing     Problem: INADEQUATE LATCH, SUCK OR SWALLOW  Goal: Demonstrate ability to latch and sustain latch, audible swallowing and satiety  Description: INTERVENTIONS:  - Assess oral anatomy, notify Physician/AP for abnormal findings  - Establish milk expression  - Maximize feeding opportunity (skin to skin, behavioral state)  - Position/latch techniques  - Discourage use of pacifier-artificial nipple  - Mechanical pumping  - Nipple Shield  - Supplemental formula feeding (Physician/AP order)  - Alternative feeding method  Outcome: Progressing

## 2023-12-06 NOTE — UTILIZATION REVIEW
Mother and baby discharged on 2023     NOTIFICATION OF INPATIENT ADMISSION   MATERNITY/DELIVERY AUTHORIZATION REQUEST   SERVICING FACILITY:   [facility], [place], [address] [facility] [place]. [address]  Tax ID: [id number]  NPI: [id number]   ATTENDING PROVIDER:  Attending Name and NPI#: George Estrada Md [[id number]]  Address: [address] [place] [address]. [facility], [address] [place] [address]  Phone: [phone]   ADMISSION INFORMATION:  Place of Service: Inpatient [address] [facility]  Place of Service Code: 21  Inpatient Admission Date/Time: 12/3/23  9:18 PM  Discharge Date/Time: 2023  6:50 PM  Admitting Diagnosis Code/Description:  Premature rupture of membranes [O42.90]  38 weeks gestation of pregnancy [Z3A.38]  Encounter for full-term uncomplicated delivery [E74]     Mother: [facility] 1985 Estimated Date of Delivery: 23  Delivering clinician: Fabiana Hammer    OB History          3    Para   2    Term   1       1    AB   1    Living   2         SAB        IAB   1    Ectopic        Multiple   0    Live Births   2                Name & MRN:   Information for the patient's :  Chris Moles) [[id number]]    Delivery Information:  Sex: male  Delivered 2023 1:10 PM by Vaginal, Spontaneous; Gestational Age: 36w0d    [place] Measurements:  Weight: 8 lb 9.4 oz (3895 g); Height: 21.5"    APGAR 1 minute 5 minutes 10 minutes   Totals: 8 9       Birth Information: 43 y.o. female MRN: [id number] Unit/Bed#: -01   Birthweight: No birth weight on file. Gestational Age: <None> Delivery Type:    APGARS Totals:        UTILIZATION REVIEW CONTACT:  Everett Alonzo Utilization   Network Utilization Review Department  Phone: [phone]  Fax [phone]  Email: [email or web address]  Contact for approvals/pending authorizations, clinical reviews, and discharge. PHYSICIAN ADVISORY SERVICES:  Medical Necessity Denial & Pquf-[id number] Review  Phone: [phone]  Fax: [phone]  Email: [email or web address]     DISCHARGE SUPPORT TEAM:  For Patients Discharge Needs & Updates  Phone: [phone] opt. 2 Fax: [phone]  Email: [email or web address]

## 2023-12-10 LAB — PLACENTA IN STORAGE: NORMAL

## 2024-02-07 NOTE — OB LABOR/OXYTOCIN SAFETY PROGRESS
Abdomen , soft, nontender, nondistended  Labor Progress Note - Venelis I Rasheed Pod 43 y.o. female MRN: [id number]    Unit/Bed#: -01 Encounter: [id number]       Contraction Frequency (minutes): 5-6  Contraction Quality: Mild  Tachysystole: No   Cervical Dilation: 5        Cervical Effacement: 70  Fetal Station: -3  Baseline Rate: 120 bpm  Fetal Heart Rate: 170 BPM  FHR Category: 1               Vital Signs:   Vitals:    12/03/23 2300   BP:    Pulse:    Temp: 98.2 °F (36.8 °C)   SpO2:        Notes/comments:   SVE as above. Patient is currently comfortable without epidural.    Continue expectant management.   Discussed with Dr. Rina Desai MD 12/4/2023 12:44 AM

## 2024-02-26 ENCOUNTER — TELEPHONE (OUTPATIENT)
Age: 39
End: 2024-02-26

## 2024-02-26 NOTE — TELEPHONE ENCOUNTER
Patient needs paperwork filled out to give her job the ok that she ca go back and do all the normal things. She needs to go back 3/4. Please advise the patient.

## 2024-02-27 NOTE — TELEPHONE ENCOUNTER
She never came for her PP visit , please send to EC as she was delivering MD or have the patient make her PP appt with any provider prior to her return to work

## 2024-03-01 ENCOUNTER — OFFICE VISIT (OUTPATIENT)
Dept: OBGYN CLINIC | Facility: CLINIC | Age: 39
End: 2024-03-01
Payer: COMMERCIAL

## 2024-03-01 VITALS — DIASTOLIC BLOOD PRESSURE: 86 MMHG | SYSTOLIC BLOOD PRESSURE: 124 MMHG | WEIGHT: 214.4 LBS | BODY MASS INDEX: 34.62 KG/M2

## 2024-03-01 DIAGNOSIS — Z01.419 WELL WOMAN EXAM WITH ROUTINE GYNECOLOGICAL EXAM: Primary | ICD-10-CM

## 2024-03-01 DIAGNOSIS — R63.5 WEIGHT GAIN: ICD-10-CM

## 2024-03-01 DIAGNOSIS — Z12.31 ENCOUNTER FOR SCREENING MAMMOGRAM FOR MALIGNANT NEOPLASM OF BREAST: ICD-10-CM

## 2024-03-01 PROCEDURE — S0612 ANNUAL GYNECOLOGICAL EXAMINA: HCPCS | Performed by: PHYSICIAN ASSISTANT

## 2024-03-01 RX ORDER — LEVOTHYROXINE SODIUM 0.12 MG/1
125 TABLET ORAL DAILY
COMMUNITY
Start: 2023-12-23

## 2024-03-01 NOTE — PROGRESS NOTES
Assessment   38 y.o.  presenting for her first annual exam in the post partum period.     Plan   Diagnoses and all orders for this visit:    Encounter for screening mammogram for malignant neoplasm of breast  -     Mammo screening bilateral w 3d & cad; Future    Weight gain  -     TSH, 3rd generation with Free T4 reflex; Future  -     Ambulatory Referral to Nutrition Services; Future    Other orders  -     levothyroxine 125 mcg tablet; Take 125 mcg by mouth daily      Pap due in    Mammogram- ordered; advised to wait to complete until 39 and has stopped breastfeeding  Weight gain- nutrition referral placed; check TSH; reviewed needs to follow up with PCP for hypothyroid management now that no longer pregnant    Perineal hygiene reviewed. Weight bearing exercises minium of 150 mins/weekly advised. Kegel exercises recommended.   SBE encouraged, A yearly mammogram is recommended for breast cancer screening starting at age 40. ASCCP guidelines reviewed. Condoms encouraged with all sexual activity to prevent STI's. Gardisil vaccines recommended up to age 45. Calcium/ Vit D dietary requirements discussed.   Advised to call with any issues, all concerns & questions addressed.   See provided information in your after visit summary     F/U Annually and PRN    Results will be released to [facility], if abnormal will call or message to review and discuss treatment plan.     History, documentation, and physical exam completed by Tara DURANT with direct supervision by me    __________________________________________________________________    Subjective     Venelis EMERALD Nuñez is a 38 y.o.  presenting for annual exam. She is without complaint and does not want STD testing today.     She is 12 weeks 4 days postpartum following a low forceps assisted vaginal delivery. The delivery was at 39 gestational weeks. I have fully reviewed the prenatal and intrapartum course. Complications include: preeclampsia  "without severe features intrapartum based on elevated blood pressures and a P/C ratio of 0.3 and second degree perineal and left vaginal lacerations.      Postpartum course has been uncomplicated. Baby \" Juanjo\" is doing well. Baby is feeding by breast feeding and bottle. Bleeding has subsided.     Has concerns about postpartum weight gain/inability to lose weight. Interested in nutrition referral    She has experienced postpartum blues/depression early on but has since subsided.    [place] Scale = 0      We discussed contraceptive options in detail including birth control pills, patches, NuvaRing, DepoProvera, Mirena/Kyleena IUD, Nexplanon in detail.  At this point she would like to continue withdrawal method and is not interested in other options.     SCREENING  Last Pap: 4/10/2023 - neg/neg   Mammo: Due at 39 - wait to complete after she stops breastfeeding    GYN  Periods are regular every 22 days, lasting 5 days.  Dysmenorrhea: none.  Cyclic symptoms include none    Sexually active: Yes - single partner - male  Contraception: withdrawal method   Hx STI: denies     Hx Abnormal pap: denies  We reviewed ASCCP guidelines for Pap testing today.  Gardasil: She is unsure if she completed the Gardasil series.    Denies vaginal discharge, itching, odor, dyspareunia, pelvic pain and vulvar/vaginal symptoms      OB     Pregnancy complications: Preeclampsia. Required forceps.   She desires future fertility.        Complaints: denies  Denies urgency, frequency, hematuria, leakage / change in stream, difficulty urinating.       BREAST  Complaints: denies   Denies: breast lump, breast tenderness, nipple discharge, skin color change, and skin lesion(s)  Personal hx: none      Pertinent Family Hx:   Family hx of breast cancer: no  Family hx of ovarian cancer: no  Family hx of colon cancer: no      GENERAL  PMH reviewed/updated and is as below.   Patient does follow with a PCP.    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 No pertinent surgical history.      Current Outpatient Medications:     levothyroxine 125 mcg tablet, Take 125 mcg by mouth daily, Disp: , Rfl:     acetaminophen (TYLENOL) 325 mg tablet, Take 2 tablets (650 mg total) by mouth every 4 (four) hours as needed for mild pain (Patient not taking: Reported on 3/1/2024), Disp: , Rfl: 0    benzocaine-menthol-lanolin-aloe (DERMOPLAST) 20-0.5 % topical spray, Apply 1 Application topically every 6 (six) hours as needed for mild pain or irritation (Patient not taking: Reported on 3/1/2024), Disp: , Rfl: 0    docusate sodium (COLACE) 100 mg capsule, Take 1 capsule (100 mg total) by mouth 2 (two) times a day (Patient not taking: Reported on 3/1/2024), Disp: , Rfl: 0    hydrocortisone 1 % cream, Apply 1 Application topically daily as needed for irritation or rash (Patient not taking: Reported on 3/1/2024), Disp: , Rfl: 0    ibuprofen (MOTRIN) 600 mg tablet, Take 1 tablet (600 mg total) by mouth every 6 (six) hours as needed for mild pain (Patient not taking: Reported on 3/1/2024), Disp: 30 tablet, Rfl: 0    Prenatal Vit-Fe Fumarate-FA (PRENATAL PO), Take by mouth (Patient not taking: Reported on 3/1/2024), Disp: , Rfl:     witch hazel-glycerin (TUCKS) topical pad, Apply 1 Pad topically every 4 (four) hours as needed for irritation (Patient not taking: Reported on 3/1/2024), Disp: , Rfl: 0    No Known Allergies    Social History     Socioeconomic History    Marital status: /Civil Union     Spouse name: Not on file    Number of children: 1    Years of education: Not on file    Highest education level: Not on file   Occupational History    Not on file   Tobacco Use    Smoking status: Never     Passive exposure: Never    Smokeless tobacco: Never   Vaping Use    Vaping status: Never Used   Substance and Sexual Activity    Alcohol use:  Not Currently     Alcohol/week: 2.0 standard drinks of alcohol     Types: 2 Glasses of wine per week    Drug use: Never    Sexual activity: Yes     Partners: Male   Other Topics Concern    Not on file   Social History Narrative    Not on file     Social Determinants of Health     Financial Resource Strain: Not on file   Food Insecurity: Not on file   Transportation Needs: Not on file   Physical Activity: Not on file   Stress: Not on file   Social Connections: Not on file   Intimate Partner Violence: Not on file   Housing Stability: Not on file       Review of Systems    ROS:  Constitutional: Negative for fatigue and unexpected weight change.   Respiratory: Negative for cough and shortness of breath.    Cardiovascular: Negative for chest pain and palpitations.   Gastrointestinal: Negative for abdominal pain and change in bowel habits  Breasts:  Negative, other than as noted above.   Genitourinary: Negative, other than as noted above.   Psychiatric: Negative for mood difficulties.      Objective      /86 (BP Location: Left arm, Patient Position: Sitting, Cuff Size: Large)   Wt 97.3 kg (214 lb 6.4 oz)   LMP 02/15/2024 (Exact Date)   Breastfeeding Yes   BMI 34.62 kg/m²     Physical Examination:    Patient appears well and is not in distress  Neck is supple without masses, no cervical or supraclavicular lymphadenopathy  Cardiovascular: regular rate and rhythm; no murmurs  Lungs: clear to auscultation bilaterally; no wheezes  Breasts are symmetrical without mass, tenderness, nipple discharge, skin changes or adenopathy.   Abdomen is soft and nontender without masses.   External genitals are normal without lesions or rashes.  Urethral meatus and urethra are normal  Bladder is normal to palpation  Vagina is normal without discharge or bleeding.   Cervix is normal without discharge or lesion.   Uterus is normal, mobile, nontender without palpable mass.  Adnexa are normal, nontender, without palpable mass.

## 2024-03-01 NOTE — PROGRESS NOTES
"Mehdi Tesfayel  1985    S:  The patient is a 38 y.o. who presents for a postpartum visit. She is 12 weeks 4 days postpartum following a low forceps assisted vaginal delivery. The delivery was at 39 gestational weeks. I have fully reviewed the prenatal and intrapartum course. Complications include: preeclampsia without severe features intrapartum based on elevated blood pressures and a P/C ratio of 0.3  and second degree perineal and left vaginal lacerations.     Postpartum course has been *** . Baby \" ***\" is doing ***. Baby is feeding by ***. Bleeding ***.     Bowel function is normal. Bladder function is normal. Patient has *** been sexually active.          She {Actions; denies/admits to:5300} postpartum blues/depression.    [place] Scale=    Last Pap: 4/10/2023- neg/neg    We discussed contraceptive options in detail including birth control pills, patches, NuvaRing, DepoProvera, Mirena/Kyleena IUD, Nexplanon in detail.  At this point she would like ***    We discussed contraceptive options while nursing including progesterone only pills, DepoProvera, Nexplanon, IUDs, and condoms.  She would like ***    Review of Systems   Respiratory: Negative.    Cardiovascular: Negative.    Gastrointestinal: Negative for constipation and diarrhea.   Genitourinary: Negative for difficulty urinating, pelvic pain, vaginal bleeding, vaginal discharge, itching or odor.      Past Medical History:   Diagnosis Date    Hypothyroidism      Family History   Problem Relation Age of Onset    Diabetes Mother     No Known Problems Half-Sister     No Known Problems Brother     No Known Problems Daughter     No Known Problems Maternal Grandmother     No Known Problems Paternal Grandmother     No Known Problems Paternal Grandfather      Social History     Socioeconomic History    Marital status: /Civil Union     Spouse name: Not on file    Number of children: 1    Years of education: Not on file    Highest education " level: Not on file   Occupational History    Not on file   Tobacco Use    Smoking status: Never     Passive exposure: Never    Smokeless tobacco: Never   Vaping Use    Vaping status: Never Used   Substance and Sexual Activity    Alcohol use: Not Currently     Alcohol/week: 2.0 standard drinks of alcohol     Types: 2 Glasses of wine per week    Drug use: Never    Sexual activity: Yes     Partners: Male   Other Topics Concern    Not on file   Social History Narrative    Not on file     Social Determinants of Health     Financial Resource Strain: Not on file   Food Insecurity: Not on file   Transportation Needs: Not on file   Physical Activity: Not on file   Stress: Not on file   Social Connections: Not on file   Intimate Partner Violence: Not on file   Housing Stability: Not on file       O:   There were no vitals taken for this visit.  ***     She appears well and in no distress  Abdomen is soft and nontender  External genitals are normal    A/P:  Postpartum visit.    Contraception -   ***   Pap due in 2026 (negative/negative on 4/10/2023)     She will return in 3 months for her yearly exam, sooner PRN.

## 2024-03-18 DIAGNOSIS — E03.9 HYPOTHYROIDISM, UNSPECIFIED TYPE: Primary | ICD-10-CM

## 2024-03-18 RX ORDER — LEVOTHYROXINE SODIUM 0.12 MG/1
125 TABLET ORAL DAILY
Qty: 90 TABLET | Refills: 1 | Status: SHIPPED | OUTPATIENT
Start: 2024-03-18

## 2024-03-29 ENCOUNTER — CLINICAL SUPPORT (OUTPATIENT)
Dept: NUTRITION | Facility: HOSPITAL | Age: 39
End: 2024-03-29
Payer: COMMERCIAL

## 2024-03-29 VITALS — BODY MASS INDEX: 33.91 KG/M2 | WEIGHT: 210 LBS

## 2024-03-29 DIAGNOSIS — R63.5 WEIGHT GAIN: ICD-10-CM

## 2024-03-29 PROCEDURE — 97802 MEDICAL NUTRITION INDIV IN: CPT

## 2024-03-29 NOTE — PROGRESS NOTES
" Nutrition Assessment Form    Patient Name: Mehdi Nuñez    YOB: 1985    Sex: Female     Assessment Date: 3/29/2024  Start Time: 11am Stop Time: 12 pm Total Minutes: 60     Data:  Present at session: self   Parent/Patient Concerns/reason for visit: \"Find a way to stay on track to eat healthy\"   Medical Dx/Reason for Referral: R63.5 rapid weight gain   Past Medical History:   Diagnosis Date    Hypothyroidism        Current Outpatient Medications   Medication Sig Dispense Refill    acetaminophen (TYLENOL) 325 mg tablet Take 2 tablets (650 mg total) by mouth every 4 (four) hours as needed for mild pain (Patient not taking: Reported on 3/1/2024)  0    benzocaine-menthol-lanolin-aloe (DERMOPLAST) 20-0.5 % topical spray Apply 1 Application topically every 6 (six) hours as needed for mild pain or irritation (Patient not taking: Reported on 3/1/2024)  0    docusate sodium (COLACE) 100 mg capsule Take 1 capsule (100 mg total) by mouth 2 (two) times a day (Patient not taking: Reported on 3/1/2024)  0    hydrocortisone 1 % cream Apply 1 Application topically daily as needed for irritation or rash (Patient not taking: Reported on 3/1/2024)  0    ibuprofen (MOTRIN) 600 mg tablet Take 1 tablet (600 mg total) by mouth every 6 (six) hours as needed for mild pain (Patient not taking: Reported on 3/1/2024) 30 tablet 0    levothyroxine 125 mcg tablet TAKE 1 TABLET BY MOUTH EVERY DAY 90 tablet 1    Prenatal Vit-Fe Fumarate-FA (PRENATAL PO) Take by mouth (Patient not taking: Reported on 3/1/2024)      witch hazel-glycerin (TUCKS) topical pad Apply 1 Pad topically every 4 (four) hours as needed for irritation (Patient not taking: Reported on 3/1/2024)  0     No current facility-administered medications for this visit.        Additional Meds/Supplements:    Special Learning Needs/barriers to learning/any new barriers    Height: HC Readings from Last 5 Encounters:   No data found for HC      Weight: Wt Readings " "from Last 10 Encounters:   03/01/24 97.3 kg (214 lb 6.4 oz)   12/04/23 108 kg (238 lb 1.6 oz)   11/30/23 108 kg (237 lb)   11/16/23 107 kg (235 lb)   11/15/23 106 kg (234 lb)   11/01/23 106 kg (233 lb)   10/19/23 105 kg (230 lb 6.4 oz)   10/17/23 105 kg (231 lb 6.4 oz)   10/04/23 102 kg (225 lb 9.6 oz)   09/06/23 98.4 kg (217 lb)     Estimated body mass index is 34.62 kg/m² as calculated from the following:    Height as of 12/4/23: 5' 5.98\" (1.676 m).    Weight as of 3/1/24: 97.3 kg (214 lb 6.4 oz).   Recent Weight Change: [x]Yes     []No  Amount:  24 lb weight loss post partum       Energy Needs: No calculations performed for this visit   No Known Allergies or intolerances    Social History     Substance and Sexual Activity   Alcohol Use Not Currently    Alcohol/week: 2.0 standard drinks of alcohol    Types: 2 Glasses of wine per week    No ETOH    Social History     Tobacco Use   Smoking Status Never    Passive exposure: Never   Smokeless Tobacco Never       Who shops? patient   Who cooks/cooking methods/Eating out/take out habits   patient  Cooking methods: air penn/ sautee    Take out: ___2 x/ month fried chicken or chinese- chicken and broccoli, vegetable fried rice   Dining out __1 x/ quarter   Exercise: No structured activity     Other: ie: Sleep habits/ stress level/ work habits household-lives with ?/ food security Goes to bed ~ 9 pm  Wakes ~ 2 am to feed baby  Wakes 4:30 am M-F  Sleeps in on weekends   [place] rested   Prior Nutritional Counseling? []Yes     [x]No  When:      Why:         Diet Hx:  Breakfast: 6 am- Silvina Dago butter bread 1 slice or crackers Diet:   Lunch:12:30 lettuce, tomatoes, broccoli, chicken breast, not much red meat; eggs with green plantains or tuna in water Water- 32 oz per day     coffee     Dinner: mother cooks pasta with cheese, pizza  Russell- with lettuce, tomatoes, ham, cheese  Mashed potatoes with cheese          Snacks: AM - 9:30- bowl of cereal- cheerios or corn flakes with " 1%   PM -   HS -    Other Notes/ Initial Assessment:  In 2022 participated in weight loss program for 6 month that provided medications for weight loss (Ca, vitamin B12, vitamin C, drank medication after meals). Lost weight from 200# and lost 10 # but regained the weight.  Breast feeds at night, supplements with formula. Patient stated she sometimes does not produce enough milk.   prefers whole milk but patient will choose 1% at work    RD discussed hydration, protein and calcium needs during lactation. Encouraged increasing water intake to at least 64 oz per day . Discussed drinking water while breastfeeding to maintain adequate hydration.   Provided examples of sources of lean protein and calcium  Discussed saturated fats from whole milk are not needed over age 2 and choosing lower saturated fat dairy is beneficial for heart health and weight management  Encouraged balancing meals with lean protein, unsaturated fats, whole grains, fruits and vegetables  Discussed the plate method of portioning foods, including half a plate fruits and vegetables or a half plate all vegetables, 1/4 of the plate a lean protein source or meat, and a 1/4 of the plate being a whole grain carb- usually 1/2-1c.  This should be followed for at least 2 meals of the day, but could also be followed for all 3.      Discussed [place] family eating dynamic. Patient has the role and responsibility of purchasing, preparing and providing healthy meals. Other members of the household have the responsibility of choosing to eat the meals provided or not. RD encouraged patient to have open line of communication with  regarding her health goals and to work together to model healthy behavior for their daughter.    Discussed various methods of increasing activity in the home using own body weight/ elle/ yoga/ pilates videos   Updated assessment (Follow up note only):     Nutrition Diagnosis:   Overweight/obesity  related to Excess  energy intake as evidenced by  BMI more than normative standard for age and sex (obesity-grade I 30-34.9)       Any change or new dx since previous visit:     Nutrition Diagnosis:   N/a      Medical Nutrition Therapy Intervention:  [x]Individualized Meal Plan 70-90 g protein per day  []Understanding Lab Values   []Basic Pathophysiology of Disease []Food/Medication Interactions   []Food Diary [x]Exercise 150 mins of moderate activity weekly, discussed importance of variety of activity, including wt bearing activities 2-3x/wk x 10-15mins. Discussed importance of activity throughout the lifecycle and its impact on overall health/stress management, etc.    []Lifestyle/Behavior Modification Techniques []Medication, Mechanism of Action   []Label Reading: CHO/ Na/ Fat/ other_________ []Self Blood Glucose Monitoring   []Weight/BMI Goals: gain/lose/maintain [x]Other - nutrition during lactation           Comprehension: []Excellent  []Very Good  [x]Good  []Fair   []Poor    Receptivity: []Excellent  []Very Good  [x]Good  []Fair   []Poor    Expected Compliance: []Excellent  []Very Good  [x]Good  []Fair   []Poor        Goals (initial)/ Progress made on previous goals/new goals:  1.Patient will balance each meal with ~20 grams of protein by next follow up   2. Patient will prepare 1 dinner a week using plate method for balance and portions by next follow up   3.       No follow-ups on file.  Labs:  CMP  Lab Results   Component Value Date    K 3.8 12/04/2023     12/04/2023    CO2 22 12/04/2023    BUN 5 12/04/2023    CREATININE 0.46 (L) 12/04/2023    GLUF 83 03/30/2023    CALCIUM 8.5 12/04/2023    CORRECTEDCA 9.2 12/04/2023    AST 25 12/04/2023    ALT 17 12/04/2023    ALKPHOS 132 (H) 12/04/2023    EGFR 125 12/04/2023       BMP  Lab Results   Component Value Date    CALCIUM 8.5 12/04/2023    K 3.8 12/04/2023    CO2 22 12/04/2023     12/04/2023    BUN 5 12/04/2023    CREATININE 0.46 (L) 12/04/2023       Lipids  No results  "found for: \"CHOL\"  Lab Results   Component Value Date    HDL 66 03/30/2023     Lab Results   Component Value Date    LDLCALC 93 03/30/2023     Lab Results   Component Value Date    TRIG 58 03/30/2023     No results found for: \"CHOLHDL\"    Hemoglobin A1C  Lab Results   Component Value Date    HGBA1C 5.6 11/02/2021       Fasting Glucose  Lab Results   Component Value Date    GLUF 83 03/30/2023       Insulin     Thyroid  Lab Results   Component Value Date    TSH 6.89 (H) 11/09/2021       Hepatic Function Panel  Lab Results   Component Value Date    ALT 17 12/04/2023    AST 25 12/04/2023    ALKPHOS 132 (H) 12/04/2023       Celiac Disease Antibody Panel  No results found for: \"ENDOMYSIAL IGA\", \"GLIADIN IGA\", \"GLIADIN IGG\", \"IGA\", \"TISSUE TRANSGLUT AB\", \"TTG IGA\"   Iron  No results found for: \"IRON\", \"TIBC\", \"FERRITIN\"         Joanne Champagne RD  [facility]  [address] [id number]    " 12-Dec-2022 16:03

## 2024-04-02 ENCOUNTER — TELEPHONE (OUTPATIENT)
Age: 39
End: 2024-04-02

## 2024-04-02 NOTE — TELEPHONE ENCOUNTER
Appointment scheduled with provider.    Reason: TCM Appointment    Symptoms: Follow up    Provider: Dr. Lee Kwon    Date/Time: Tuesday 4/9/2024 at 4:40 pm

## 2024-04-16 ENCOUNTER — OFFICE VISIT (OUTPATIENT)
Dept: FAMILY MEDICINE CLINIC | Facility: CLINIC | Age: 39
End: 2024-04-16
Payer: COMMERCIAL

## 2024-04-16 VITALS
SYSTOLIC BLOOD PRESSURE: 120 MMHG | WEIGHT: 213 LBS | HEART RATE: 73 BPM | TEMPERATURE: 97.8 F | BODY MASS INDEX: 34.4 KG/M2 | OXYGEN SATURATION: 98 % | DIASTOLIC BLOOD PRESSURE: 80 MMHG

## 2024-04-16 DIAGNOSIS — L60.8 NAIL DEFORMITY: ICD-10-CM

## 2024-04-16 DIAGNOSIS — F41.9 ANXIETY: ICD-10-CM

## 2024-04-16 DIAGNOSIS — Z00.00 ANNUAL PHYSICAL EXAM: Primary | ICD-10-CM

## 2024-04-16 DIAGNOSIS — E03.8 OTHER SPECIFIED HYPOTHYROIDISM: ICD-10-CM

## 2024-04-16 DIAGNOSIS — M25.532 LEFT WRIST PAIN: ICD-10-CM

## 2024-04-16 DIAGNOSIS — Z13.1 SCREENING FOR DIABETES MELLITUS: ICD-10-CM

## 2024-04-16 DIAGNOSIS — Z13.6 SCREENING FOR CARDIOVASCULAR CONDITION: ICD-10-CM

## 2024-04-16 DIAGNOSIS — M65.4 TENOSYNOVITIS, DE QUERVAIN: ICD-10-CM

## 2024-04-16 PROCEDURE — 99214 OFFICE O/P EST MOD 30 MIN: CPT | Performed by: FAMILY MEDICINE

## 2024-04-16 PROCEDURE — 99395 PREV VISIT EST AGE 18-39: CPT | Performed by: FAMILY MEDICINE

## 2024-04-16 RX ORDER — PREDNISONE 20 MG/1
40 TABLET ORAL DAILY
Qty: 10 TABLET | Refills: 0 | Status: SHIPPED | OUTPATIENT
Start: 2024-04-16 | End: 2024-04-21

## 2024-04-16 NOTE — ASSESSMENT & PLAN NOTE
Lab Results   Component Value Date    [id number] 0.969 11/03/2023    TSH 6.89 (H) 11/09/2021     Stable  Remains on levothyroxine 125mcg daily  Due for repeat tsh level

## 2024-04-16 NOTE — PROGRESS NOTES
ADULT ANNUAL PHYSICAL  [facility] - [facility]    NAME: Mehdi Nuñez  AGE: 38 y.o. SEX: female  : 1985     DATE: 2024     Assessment and Plan:     Problem List Items Addressed This Visit        Endocrine    Hypothyroidism     Lab Results   Component Value Date    WIA6ZPCKYCTT 0.969 2023    TSH 6.89 (H) 2021     Stable  Remains on levothyroxine 125mcg daily  Due for repeat tsh level         Relevant Medications    predniSONE 20 mg tablet       Musculoskeletal and Integument    Tenosynovitis, de Quervain     New  Ongoing for 3 weeks  Worse at night  Positive finkelstein with tenderness on palpation  Patient declines csi today  Start rigid splinting qhs  Start prednisone 40mg for 5 days  Hand out provided  Follow up as needed            Behavioral Health    Anxiety     Well controlled            Surgery/Wound/Pain    Left wrist pain     New and unchanged  Ongoing for the past 3 weeks  No numbness  Pain worse at night         Relevant Medications    predniSONE 20 mg tablet   Other Visit Diagnoses     Annual physical exam    -  Primary    Screening for diabetes mellitus        Relevant Orders    Comprehensive metabolic panel    Screening for cardiovascular condition        Relevant Orders    Lipid panel            Immunizations and preventive care screenings were discussed with patient today. Appropriate education was printed on patient's after visit summary.    Counseling:  Alcohol/drug use: discussed moderation in alcohol intake, the recommendations for healthy alcohol use, and avoidance of illicit drug use.  Dental Health: discussed importance of regular tooth brushing, flossing, and dental visits.  Injury prevention: discussed safety/seat belts, safety helmets, smoke detectors, carbon dioxide detectors, and smoking near bedding or upholstery.  Sexual health: discussed sexually transmitted diseases, partner selection, use of  condoms, avoidance of unintended pregnancy, and contraceptive alternatives.  Exercise: the importance of regular exercise/physical activity was discussed. Recommend exercise 3-5 times per week for at least 30 minutes.          Return in about 1 year (around 4/16/2025) for Annual physical.     Chief Complaint:     No chief complaint on file.     History of Present Illness:     Adult Annual Physical   Patient here for a comprehensive physical exam. The patient reports problems - left wrist pain .    Diet and Physical Activity  Diet/Nutrition: well balanced diet and consuming 3-5 servings of fruits/vegetables daily.   Exercise: walking and 5-7 times a week on average.      Depression Screening  PHQ-2/9 Depression Screening    Little interest or pleasure in doing things: 0 - not at all  Feeling down, depressed, or hopeless: 0 - not at all  PHQ-2 Score: 0  PHQ-2 Interpretation: Negative depression screen       General Health  Sleep: sleeps well and gets 7-8 hours of sleep on average.   Hearing: normal - bilateral.  Vision: most recent eye exam <1 year ago and wears glasses.   Dental: regular dental visits and brushes teeth once daily.       /GYN Health  Follows with gynecology? yes   Last menstrual period: 4/4/2024  Contraceptive method:  none .        Review of Systems:     Review of Systems   Constitutional:  Negative for activity change, chills, diaphoresis and fever.   HENT:  Negative for ear pain, hearing loss, postnasal drip, rhinorrhea, sinus pressure, sinus pain, sneezing and sore throat.    Respiratory:  Negative for cough, chest tightness, shortness of breath and wheezing.    Cardiovascular:  Negative for chest pain, palpitations and leg swelling.   Gastrointestinal:  Negative for abdominal pain, blood in stool, constipation, diarrhea, nausea and vomiting.   Genitourinary:  Negative for dysuria, frequency, hematuria and urgency.   Musculoskeletal:  Positive for arthralgias. Negative for myalgias.    Neurological:  Negative for dizziness, syncope, weakness, light-headedness, numbness and headaches.      Past Medical History:     Past Medical History:   Diagnosis Date   • Hypothyroidism       Past Surgical History:     History reviewed. No pertinent surgical history.   Social History:     Social History     Socioeconomic History   • Marital status: /Civil Union     Spouse name: None   • Number of children: 1   • Years of education: None   • Highest education level: None   Occupational History   • None   Tobacco Use   • Smoking status: Never     Passive exposure: Never   • Smokeless tobacco: Never   Vaping Use   • Vaping status: Never Used   Substance and Sexual Activity   • Alcohol use: Not Currently     Alcohol/week: 2.0 standard drinks of alcohol     Types: 2 Glasses of wine per week   • Drug use: Never   • Sexual activity: Yes     Partners: Male   Other Topics Concern   • None   Social History Narrative   • None     Social Determinants of Health     Financial Resource Strain: Not on file   Food Insecurity: Not on file   Transportation Needs: Not on file   Physical Activity: Not on file   Stress: Not on file   Social Connections: Not on file   Intimate Partner Violence: Not on file   Housing Stability: Not on file      Family History:     Family History   Problem Relation Age of Onset   • Diabetes Mother    • No Known Problems Half-Sister    • No Known Problems Brother    • No Known Problems Daughter    • No Known Problems Maternal Grandmother    • No Known Problems Paternal Grandmother    • No Known Problems Paternal Grandfather       Current Medications:     Current Outpatient Medications   Medication Sig Dispense Refill   • predniSONE 20 mg tablet Take 2 tablets (40 mg total) by mouth daily for 5 days 10 tablet 0   • hydrocortisone 1 % cream Apply 1 Application topically daily as needed for irritation or rash (Patient not taking: Reported on 3/1/2024)  0   • levothyroxine 125 mcg tablet TAKE 1  TABLET BY MOUTH EVERY DAY 90 tablet 1     No current facility-administered medications for this visit.      Allergies:     No Known Allergies   Physical Exam:     /80 (BP Location: Left arm, Patient Position: Sitting, Cuff Size: Standard)   Pulse 73   Temp 97.8 °F (36.6 °C) (Temporal)   Wt 96.6 kg (213 lb)   LMP 04/04/2024 (Exact Date)   SpO2 98%   Breastfeeding No   BMI 34.40 kg/m²     Physical Exam  Constitutional:       General: She is not in acute distress.     Appearance: Normal appearance. She is well-developed. She is not diaphoretic.   HENT:      Head: Normocephalic and atraumatic.      Right Ear: Tympanic membrane, ear canal and external ear normal. There is no impacted cerumen.      Left Ear: Tympanic membrane, ear canal and external ear normal. There is no impacted cerumen.      Nose: Nose normal. No congestion or rhinorrhea.      Mouth/Throat:      Mouth: Mucous membranes are moist.      Pharynx: Oropharynx is clear. No oropharyngeal exudate.   Eyes:      Conjunctiva/sclera: Conjunctivae normal.      Pupils: Pupils are equal, round, and reactive to light.   Neck:      Vascular: No JVD.   Cardiovascular:      Rate and Rhythm: Normal rate and regular rhythm.      Heart sounds: Normal heart sounds. No murmur heard.     No friction rub. No gallop.   Pulmonary:      Effort: Pulmonary effort is normal. No respiratory distress.      Breath sounds: Normal breath sounds. No wheezing or rales.   Chest:      Chest wall: No tenderness.   Abdominal:      General: Bowel sounds are normal. There is no distension.      Palpations: Abdomen is soft.      Tenderness: There is no abdominal tenderness. There is no right CVA tenderness, left CVA tenderness, guarding or rebound.   Musculoskeletal:         General: No tenderness. Normal range of motion.      Cervical back: No tenderness.   Lymphadenopathy:      Cervical: No cervical adenopathy.   Skin:     General: Skin is warm and dry.   Neurological:      Mental  Status: She is alert and oriented to person, place, and time.      Cranial Nerves: No cranial nerve deficit.   Psychiatric:         Mood and Affect: Mood and affect normal.         Behavior: Behavior normal.          Lee Kwon DO   [facility]

## 2024-04-16 NOTE — ASSESSMENT & PLAN NOTE
New  Ongoing for 3 weeks  Worse at night  Positive finkelstein with tenderness on palpation  Patient declines csi today  Start rigid splinting qhs  Start prednisone 40mg for 5 days  Hand out provided  Follow up as needed

## 2024-05-06 ENCOUNTER — OFFICE VISIT (OUTPATIENT)
Dept: FAMILY MEDICINE CLINIC | Facility: CLINIC | Age: 39
End: 2024-05-06
Payer: COMMERCIAL

## 2024-05-06 VITALS
OXYGEN SATURATION: 97 % | SYSTOLIC BLOOD PRESSURE: 114 MMHG | TEMPERATURE: 97.8 F | HEART RATE: 85 BPM | BODY MASS INDEX: 34.07 KG/M2 | DIASTOLIC BLOOD PRESSURE: 62 MMHG | WEIGHT: 211 LBS

## 2024-05-06 DIAGNOSIS — M65.4 TENOSYNOVITIS, DE QUERVAIN: Primary | ICD-10-CM

## 2024-05-06 PROCEDURE — 99213 OFFICE O/P EST LOW 20 MIN: CPT | Performed by: FAMILY MEDICINE

## 2024-05-06 PROCEDURE — 20550 NJX 1 TENDON SHEATH/LIGAMENT: CPT | Performed by: FAMILY MEDICINE

## 2024-05-06 RX ORDER — LIDOCAINE HYDROCHLORIDE 20 MG/ML
0.5 INJECTION, SOLUTION EPIDURAL; INFILTRATION; INTRACAUDAL; PERINEURAL
Status: COMPLETED | OUTPATIENT
Start: 2024-05-06 | End: 2024-05-06

## 2024-05-06 RX ORDER — TRIAMCINOLONE ACETONIDE 40 MG/ML
20 INJECTION, SUSPENSION INTRA-ARTICULAR; INTRAMUSCULAR
Status: COMPLETED | OUTPATIENT
Start: 2024-05-06 | End: 2024-05-06

## 2024-05-06 RX ADMIN — LIDOCAINE HYDROCHLORIDE 0.5 ML: 20 INJECTION, SOLUTION EPIDURAL; INFILTRATION; INTRACAUDAL; PERINEURAL at 16:40

## 2024-05-06 RX ADMIN — TRIAMCINOLONE ACETONIDE 20 MG: 40 INJECTION, SUSPENSION INTRA-ARTICULAR; INTRAMUSCULAR at 16:40

## 2024-05-06 NOTE — ASSESSMENT & PLAN NOTE
Stable  Pain is unchanged from last visit  Agreeable for csi of left extensor compartment  Tolerated well with immediate improvement of pain  If symptoms do not last will refer to hand surgery  Discussed return precautions  Follow up as needed

## 2024-05-06 NOTE — PROGRESS NOTES
Assessment/Plan:      1. Tenosynovitis, de Quervain  Assessment & Plan:  Stable  Pain is unchanged from last visit  Agreeable for csi of left extensor compartment  Tolerated well with immediate improvement of pain  If symptoms do not last will refer to hand surgery  Discussed return precautions  Follow up as needed    Orders:  -     Hand/upper extremity injection: L extensor compartment 1  -     lidocaine (PF) (XYLOCAINE-MPF) 2 % injection 0.5 mL  -     triamcinolone acetonide (KENALOG-40) 40 mg/mL injection 20 mg            Subjective:      Patient ID: Mehdi Nuñez is a 38 y.o. female presents today for follow up for de quervains tenosynvitis. Had mild improvement with oral prednisone. She has been using wrist splinting without much help. Agreeable for CSI today.    Hand Pain   Pertinent negatives include no chest pain.       The following portions of the patient's history were reviewed and updated as appropriate: allergies, current medications, past family history, past medical history, past social history, past surgical history, and problem list.    Review of Systems   Constitutional:  Negative for chills and fever.   HENT:  Negative for ear pain and sore throat.    Eyes:  Negative for pain and visual disturbance.   Respiratory:  Negative for cough and shortness of breath.    Cardiovascular:  Negative for chest pain and palpitations.   Gastrointestinal:  Negative for abdominal pain and vomiting.   Genitourinary:  Negative for dysuria and hematuria.   Musculoskeletal:  Positive for arthralgias. Negative for back pain.   Skin:  Negative for color change and rash.   Neurological:  Negative for seizures and syncope.   All other systems reviewed and are negative.        Objective:      /62 (BP Location: Left arm, Patient Position: Sitting, Cuff Size: Large)   Pulse 85   Temp 97.8 °F (36.6 °C)   Wt 95.7 kg (211 lb)   LMP 04/04/2024 (Exact Date)   SpO2 97%   BMI 34.07 kg/m²           Physical Exam  Vitals reviewed.   Constitutional:       General: She is not in acute distress.     Appearance: She is well-developed. She is not ill-appearing, toxic-appearing or diaphoretic.   HENT:      Head: Normocephalic and atraumatic.      Right Ear: External ear normal.      Left Ear: External ear normal.      Nose: Nose normal. No congestion.      Mouth/Throat:      Mouth: Mucous membranes are moist.      Pharynx: Oropharynx is clear. No oropharyngeal exudate.   Eyes:      General: No scleral icterus.        Right eye: No discharge.         Left eye: No discharge.      Conjunctiva/sclera: Conjunctivae normal.      Pupils: Pupils are equal, round, and reactive to light.   Neck:      Thyroid: No thyromegaly.      Vascular: No JVD.      Trachea: No tracheal deviation.   Cardiovascular:      Rate and Rhythm: Normal rate and regular rhythm.      Heart sounds: Normal heart sounds. No murmur heard.     No friction rub. No gallop.   Pulmonary:      Effort: Pulmonary effort is normal. No respiratory distress.      Breath sounds: Normal breath sounds. No wheezing or rales.   Chest:      Chest wall: No tenderness.   Abdominal:      General: Bowel sounds are normal. There is no distension.      Palpations: Abdomen is soft.      Tenderness: There is no abdominal tenderness. There is no right CVA tenderness, left CVA tenderness, guarding or rebound.   Musculoskeletal:         General: Normal range of motion.      Cervical back: Normal range of motion and neck supple. No tenderness.      Right lower leg: No edema.      Left lower leg: No edema.      Comments: Positive finkelstein test on left   Lymphadenopathy:      Cervical: No cervical adenopathy.   Skin:     General: Skin is warm and dry.      Coloration: Skin is not pale.      Findings: No erythema.   Neurological:      Mental Status: She is alert and oriented to person, place, and time. Mental status is at baseline.   Psychiatric:         Mood and Affect: Mood normal.          Behavior: Behavior normal.         Thought Content: Thought content normal.         Judgment: Judgment normal.           Hand/upper extremity injection: L extensor compartment 1  Universal Protocol:  Consent: Verbal consent obtained.  Risks and benefits: risks, benefits and alternatives were discussed  Consent given by: patient  Patient identity confirmed: verbally with patient  Supporting Documentation  Indications: pain and tendon swelling   Procedure Details  Condition:de Quervain's tenosynovitis Site: L extensor compartment 1   Preparation: Patient was prepped and draped in the usual sterile fashion  Needle size: 25 G  Ultrasound guidance: no  Approach: radial  Medications administered: 0.5 mL lidocaine (PF) 2 %; 20 mg triamcinolone acetonide 40 mg/mL  Patient tolerance: patient tolerated the procedure well with no immediate complications  Dressing:  Sterile dressing applied

## 2024-06-25 ENCOUNTER — TELEPHONE (OUTPATIENT)
Age: 39
End: 2024-06-25

## 2024-06-25 NOTE — TELEPHONE ENCOUNTER
Patient calling in for irregular periods, I was able to get her an appt for 07/30/24. Patient is also added to the wait list in case of any cancellations. If you would like to see patient sooner please reach out to patient.    Tahnk you

## 2024-07-12 ENCOUNTER — NURSE TRIAGE (OUTPATIENT)
Age: 39
End: 2024-07-12

## 2024-07-12 NOTE — TELEPHONE ENCOUNTER
"Pt calling with concerns for not having a period since March. Pt took numerous pregnancy tests that returned negative. Pt Is not on birth control or hormones. States normally has regular periods. Pt also states she has been feeling dizzy frequently as well and is becoming concerned. RN advised that as she gets closer to 39 may see a shift and irregularity in her period cycles. May go several months without a period, then have a period. May have just spotting, etc. Advised that she should keep 7/20 appointment with provider for now. Contact PCP about dizziness. Normally would expedite appointment if she was bleeding very heavily or having severe cramping as well as dizzy, but since she is not bleeding at all, dizziness may not be related to missed period. Advised increased hydration. Call back with new symptoms. RN also will let provider know and see if has additional recommendations. Staff will return a call with recommendations. Pt agreeable to plan. No further questions.     Reason for Disposition  • Menstrual period, missed or late    Answer Assessment - Initial Assessment Questions  1. LMP:  \"When did your last menstrual period begin?\"      March 2024  2. DAYS LATE: \"How many days late is your menstrual period?\"      Months  3. REGULARITY: \"How regular are your periods?\"      Regular  4. PREGNANCY: \"Is there any chance you are pregnant?\" (e.g., unprotected intercourse, missed birth control pill, broken condom) \"Have you used a home pregnancy test?\"      Negative pregnancy tests  5. BREASTFEEDING: \"Are you breastfeeding?\"      Denies  6. BIRTH CONTROL PILLS: \"Are you taking birth control pills, or have you stopped recently?\"      Denies  7. DEPOPROVERA: \"Has your doctor given you a shot to prevent pregnancy?\" (e.g., Depoprovera injection)      Denies  8. CAUSE: \"What do you think caused the missed period?\" (e.g., stress, rapid weight loss, excessive exercise)      Unsure  9. OTHER SYMPTOMS: \"Do you have any other " "symptoms?\" (e.g., abdominal pain)      Dizziness    Protocols used: Menstrual Period - Missed or Late-ADULT-[place]    "

## 2024-07-30 ENCOUNTER — OFFICE VISIT (OUTPATIENT)
Dept: OBGYN CLINIC | Facility: CLINIC | Age: 39
End: 2024-07-30
Payer: COMMERCIAL

## 2024-07-30 VITALS — SYSTOLIC BLOOD PRESSURE: 110 MMHG | BODY MASS INDEX: 33.65 KG/M2 | WEIGHT: 208.4 LBS | DIASTOLIC BLOOD PRESSURE: 84 MMHG

## 2024-07-30 DIAGNOSIS — N91.2 AMENORRHEA: Primary | ICD-10-CM

## 2024-07-30 DIAGNOSIS — Z32.02 NEGATIVE PREGNANCY TEST: ICD-10-CM

## 2024-07-30 PROBLEM — O14.90 PREECLAMPSIA: Status: RESOLVED | Noted: 2023-12-04 | Resolved: 2024-07-30

## 2024-07-30 LAB — SL AMB POCT URINE HCG: NEGATIVE

## 2024-07-30 PROCEDURE — 81025 URINE PREGNANCY TEST: CPT | Performed by: PHYSICIAN ASSISTANT

## 2024-07-30 PROCEDURE — 99213 OFFICE O/P EST LOW 20 MIN: CPT | Performed by: PHYSICIAN ASSISTANT

## 2024-07-30 RX ORDER — NORETHINDRONE ACETATE AND ETHINYL ESTRADIOL .02; 1 MG/1; MG/1
1 TABLET ORAL DAILY
Qty: 84 TABLET | Refills: 1 | Status: SHIPPED | OUTPATIENT
Start: 2024-07-30

## 2024-07-30 RX ORDER — MEDROXYPROGESTERONE ACETATE 10 MG/1
10 TABLET ORAL DAILY
Qty: 10 TABLET | Refills: 0 | Status: SHIPPED | OUTPATIENT
Start: 2024-07-30 | End: 2024-08-09

## 2024-07-30 NOTE — PROGRESS NOTES
Assessment & Plan     1. Amenorrhea  -See discussion below.  Advised to complete labs within the next few days.  Start Provera to initiate withdrawal bleed.  Call the office if no withdrawal bleed after completing Provera course.  Negative hCG in office today  -Patient would like to start a birth control method for contraception as well as menstrual regulation.  Advise she may start this when she has withdrawal bleed from Provera.  Rx sent to pharmacy  -RTO in 3 months for pill check  - norethindrone-ethinyl estradiol (Junel 1/20) 1-20 MG-MCG per tablet; Take 1 tablet by mouth daily  Dispense: 84 tablet; Refill: 1  - medroxyPROGESTERone (PROVERA) 10 mg tablet; Take 1 tablet (10 mg total) by mouth daily for 10 days  Dispense: 10 tablet; Refill: 0  - TSH, 3rd generation with Free T4 reflex; Future  - Prolactin; Future  - DHEA-sulfate; Future  - Testosterone; Future  - 17-Hydroxyprogesterone; Future    2. Negative pregnancy test  - POCT urine HCG      Subjective   Patient ID: Mehdi Nuñez is a 38 y.o. female.    HPI    Patient presents to the office today for a problem visit.  She is about 7 months postpartum.  Stopped breast-feeding in March.  She states she has not had a menses since her first cycle in March after stopping breast-feeding.  She is taken several negative home pregnancy tests.  She states menses prior to having baby were normal and regular.    They are not currently using anything for contraception.  She states they are infrequently sexually active.  They do not desire pregnancy at this time.      Discussed completing workup for amenorrhea.  Advise she complete lab work which she is agreeable to.  Negative hCG in office today.  Reviewed the importance of allowing for withdrawal bleed.  Will initiate withdrawal bleed with Provera-reviewed she should expect bleeding few days after completing course of Provera.  If she does not experience bleeding she was advised to call the office.    She is  interested in starting a birth control method today.  She is mainly interested in pills.  Interested in pills for menstrual regulation but also for pregnancy prevention.  She has never been on pills or any birth control method before.  She is not interested in a LARC.  She denies history of hypertension, liver or gallbladder dysfunction, migraine with aura, personal or family history of clots/clotting disorder.  She is not a smoker.    The following portions of the patient's history were reviewed and updated as appropriate: allergies, current medications, past family history, past medical history, past social history, past surgical history, and problem list.    Review of Systems    Objective     Vitals:    07/30/24 0751   BP: 110/84       Physical Exam  Constitutional:       Appearance: Normal appearance.   HENT:      Head: Normocephalic and atraumatic.   Pulmonary:      Effort: Pulmonary effort is normal.   Abdominal:      General: Abdomen is flat.   Musculoskeletal:      Right lower leg: No edema.      Left lower leg: No edema.   Neurological:      General: No focal deficit present.      Mental Status: She is alert. Mental status is at baseline.   Skin:     General: Skin is warm and dry.   Psychiatric:         Mood and Affect: Mood normal.         Behavior: Behavior normal.         Thought Content: Thought content normal.         Judgment: Judgment normal.   Vitals reviewed.

## 2024-08-01 ENCOUNTER — APPOINTMENT (OUTPATIENT)
Dept: LAB | Facility: CLINIC | Age: 39
End: 2024-08-01
Payer: COMMERCIAL

## 2024-08-01 DIAGNOSIS — Z13.6 SCREENING FOR CARDIOVASCULAR CONDITION: ICD-10-CM

## 2024-08-01 DIAGNOSIS — Z13.1 SCREENING FOR DIABETES MELLITUS: ICD-10-CM

## 2024-08-01 DIAGNOSIS — N91.2 AMENORRHEA: ICD-10-CM

## 2024-08-01 LAB
ALBUMIN SERPL BCG-MCNC: 4.2 G/DL (ref 3.5–5)
ALP SERPL-CCNC: 79 U/L (ref 34–104)
ALT SERPL W P-5'-P-CCNC: 15 U/L (ref 7–52)
ANION GAP SERPL CALCULATED.3IONS-SCNC: 6 MMOL/L (ref 4–13)
AST SERPL W P-5'-P-CCNC: 19 U/L (ref 13–39)
BILIRUB SERPL-MCNC: 0.53 MG/DL (ref 0.2–1)
BUN SERPL-MCNC: 13 MG/DL (ref 5–25)
CALCIUM SERPL-MCNC: 9.2 MG/DL (ref 8.4–10.2)
CHLORIDE SERPL-SCNC: 103 MMOL/L (ref 96–108)
CHOLEST SERPL-MCNC: 183 MG/DL
CO2 SERPL-SCNC: 28 MMOL/L (ref 21–32)
CREAT SERPL-MCNC: 0.67 MG/DL (ref 0.6–1.3)
GFR SERPL CREATININE-BSD FRML MDRD: 111 ML/MIN/1.73SQ M
GLUCOSE P FAST SERPL-MCNC: 74 MG/DL (ref 65–99)
HDLC SERPL-MCNC: 51 MG/DL
LDLC SERPL CALC-MCNC: 116 MG/DL (ref 0–100)
NONHDLC SERPL-MCNC: 132 MG/DL
POTASSIUM SERPL-SCNC: 4 MMOL/L (ref 3.5–5.3)
PROLACTIN SERPL-MCNC: 10.32 NG/ML (ref 3.34–26.72)
PROT SERPL-MCNC: 7.1 G/DL (ref 6.4–8.4)
SODIUM SERPL-SCNC: 137 MMOL/L (ref 135–147)
T4 FREE SERPL-MCNC: 1.11 NG/DL (ref 0.61–1.12)
TESTOST SERPL-MSCNC: 21 NG/DL
TRIGL SERPL-MCNC: 81 MG/DL
TSH SERPL DL<=0.05 MIU/L-ACNC: 0.39 UIU/ML (ref 0.45–4.5)

## 2024-08-01 PROCEDURE — 82627 DEHYDROEPIANDROSTERONE: CPT

## 2024-08-01 PROCEDURE — 36415 COLL VENOUS BLD VENIPUNCTURE: CPT

## 2024-08-01 PROCEDURE — 83498 ASY HYDROXYPROGESTERONE 17-D: CPT

## 2024-08-01 PROCEDURE — 80053 COMPREHEN METABOLIC PANEL: CPT

## 2024-08-01 PROCEDURE — 84403 ASSAY OF TOTAL TESTOSTERONE: CPT

## 2024-08-01 PROCEDURE — 80061 LIPID PANEL: CPT

## 2024-08-01 PROCEDURE — 84443 ASSAY THYROID STIM HORMONE: CPT

## 2024-08-01 PROCEDURE — 84146 ASSAY OF PROLACTIN: CPT

## 2024-08-01 PROCEDURE — 84439 ASSAY OF FREE THYROXINE: CPT

## 2024-08-02 LAB — DHEA-S SERPL-MCNC: 90 UG/DL (ref 57.3–279.2)

## 2024-08-09 LAB — 17OHP SERPL-MCNC: 123 NG/DL

## 2024-08-12 ENCOUNTER — TELEPHONE (OUTPATIENT)
Dept: FAMILY MEDICINE CLINIC | Facility: CLINIC | Age: 39
End: 2024-08-12

## 2024-08-12 NOTE — TELEPHONE ENCOUNTER
I will call Pt and let her know that her Thyroid is mildly elevated, Dr will order blood for a recheck in 3 months

## 2024-08-19 ENCOUNTER — TELEPHONE (OUTPATIENT)
Age: 39
End: 2024-08-19

## 2024-08-19 NOTE — TELEPHONE ENCOUNTER
Patient contacted the office this morning stating that she had lab work 08/01, asking if pcp could review for further recommendation/instructions. Was ordered and already reviewed by obgyn. Per there recommendation to follow up with pcp to address thyroid levels.

## 2024-09-25 DIAGNOSIS — E03.9 HYPOTHYROIDISM, UNSPECIFIED TYPE: ICD-10-CM

## 2024-09-25 RX ORDER — LEVOTHYROXINE SODIUM 125 UG/1
125 TABLET ORAL DAILY
Qty: 90 TABLET | Refills: 1 | Status: SHIPPED | OUTPATIENT
Start: 2024-09-25

## 2024-12-12 ENCOUNTER — TELEPHONE (OUTPATIENT)
Age: 39
End: 2024-12-12

## 2024-12-12 ENCOUNTER — OFFICE VISIT (OUTPATIENT)
Dept: BARIATRICS | Facility: CLINIC | Age: 39
End: 2024-12-12
Payer: COMMERCIAL

## 2024-12-12 VITALS
WEIGHT: 209.4 LBS | RESPIRATION RATE: 16 BRPM | SYSTOLIC BLOOD PRESSURE: 120 MMHG | DIASTOLIC BLOOD PRESSURE: 78 MMHG | TEMPERATURE: 97.7 F | BODY MASS INDEX: 32.87 KG/M2 | HEIGHT: 67 IN | HEART RATE: 80 BPM

## 2024-12-12 DIAGNOSIS — E66.811 OBESITY, CLASS I, BMI 30-34.9: Primary | ICD-10-CM

## 2024-12-12 PROCEDURE — 99203 OFFICE O/P NEW LOW 30 MIN: CPT | Performed by: PHYSICIAN ASSISTANT

## 2024-12-12 RX ORDER — TIRZEPATIDE 2.5 MG/.5ML
2.5 INJECTION, SOLUTION SUBCUTANEOUS WEEKLY
Qty: 2 ML | Refills: 0 | Status: SHIPPED | OUTPATIENT
Start: 2024-12-12 | End: 2025-01-09

## 2024-12-12 NOTE — PROGRESS NOTES
Assessment & Plan  Obesity, Class I, BMI 30-34.9  -Discussed options of HealthyCORE-Intensive Lifestyle Intervention Program, Very Low Calorie Diet-VLCD, and Conservative Program and the role of weight loss medications  -Explained the importance of making lifestyle changes if utilizing medication to aid in weight loss  -Discussed that obesity is a chronic disease and medications are typically used long term as stopped medication will increase risk of weight gain.   -Initial weight loss goal of 5-10% weight loss for improved health  -Screening labs and records reviewed from prior  - STOP BANG- 2/8  - Initial Weight: 209  - Goal Weight: 170    -Patient is interested in pursuing Conservative Program    Goals:  -Recommend Referral to Registered Dietitian  -Recommend 8335-3673 Calorie diet until seen by RD, meal plan provided. Suggest Calorie tracking José Miguel such as [employer].   -To drink at least 64oz of water daily. No sugary beverages.  -Recommend working up to at least 150 minutes of exercise per week including full body strength training 2x per week    Discussed medication options  She is interested in  treatment with Zepbound  Medication Contract not Signed today due to lack of time.   Discussed expected weight loss of approximately 20% along with lifestyle modifications  Discussed risks/side effects of medication and demonstrated pen device  Recommend small/low fat meals and stop eating when full to avoid side effects.  Discussed importance of adequate protein intake and strength training to reduce risk of muscle loss.   Discussed need to stop medication for at least 1 week prior to planned surgery/endoscopy  Denies hx Pancreatitis or FH of Medullary cell Thyroid CA/MEN2 syndrome  Start Zepbound  2.5mg weekly x 4 weeks  Advised to contact office in 2 weeks with update regarding tolerability and at that time will increase to 5mg dose if tolerating medication with no significant side effects.    Discussed  importance of using birth control while on medication and need for back up birth control if resuming OCP x 4 weeks after starting zepbound and 4 weeks after any dose increase.               Return for 2 month nurse visit.  Also 1 hour RD. .         Subjective:   Chief Complaint   Patient presents with   • Consult     MWM Consult; GW 170lbs; Waist 42in; Stop Bang 2/8       Patient ID: Mehdi Nuñez  is a 39 y.o. female with excess weight/obesity here to pursue weight management.    HPI: Here for MWM consult    Obesity/Excess Weight:  Current BMI: Body mass index is 32.8 kg/m².   Onset:  About 5 years ago after birth of first child.   Previous Weight Loss Attempts:  Weight management program in [place]-- was paying for diet and a meal plan  This was working well but stopped due to pregnancy. This also became expensive.  This was in 2020. Was treatment with pills, not sure on name of medication.   Contributing factors:  Stress, lack of time due ot school, work, kids, and taking care of house.   Weight Related Comorbidities: None  FH of DM on mother    No plans for pregnancy at this time  Was on OCP, but stopped.   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   Sleep: Not sleeping well, has 1 year old baby.     she  denies personal and family history of  pancreatitis, Medullary Cell Thyroid Cancer, or MEN-2 tumors.  Denies any hx of glaucoma, seizures, kidney stones, gallstones.  Denies Hx of CAD, PAD, palpitations, arrhythmia.   Denies uncontrolled anxiety or depression, suicidal thoughts, insomnia, or sleep disturbance.  "  +anxiety   Hx depression in 2019, now OK    Wt Readings from Last 20 Encounters:   12/12/24 95 kg (209 lb 6.4 oz)   07/30/24 94.5 kg (208 lb 6.4 oz)   05/06/24 95.7 kg (211 lb)   04/16/24 96.6 kg (213 lb)   03/29/24 95.3 kg (210 lb)   03/01/24 97.3 kg (214 lb 6.4 oz)   12/04/23 108 kg (238 lb 1.6 oz)   11/30/23 108 kg (237 lb)   11/16/23 107 kg (235 lb)   11/15/23 106 kg (234 lb)   11/01/23 106 kg (233 lb)   10/19/23 105 kg (230 lb 6.4 oz)   10/17/23 105 kg (231 lb 6.4 oz)   10/04/23 102 kg (225 lb 9.6 oz)   09/06/23 98.4 kg (217 lb)   08/07/23 95.7 kg (211 lb)   08/01/23 94 kg (207 lb 3.2 oz)   07/14/23 88.9 kg (196 lb)   06/20/23 87.3 kg (192 lb 7.4 oz)   06/16/23 87.5 kg (193 lb)        Past Medical History:   Diagnosis Date   • Hyperthyroidism    • Hypothyroidism        History reviewed. No pertinent surgical history.     No Known Allergies     Review of Systems    Objective:    /78 (BP Location: Left arm, Patient Position: Sitting)   Pulse 80   Temp 97.7 °F (36.5 °C) (Tympanic)   Resp 16   Ht 5' 7\" (1.702 m)   Wt 95 kg (209 lb 6.4 oz)   LMP 12/04/2024   BMI 32.80 kg/m²     Physical Exam:  Constitutional:  she  appears well-developed and well-nourished. No distress.   HENT:   Head: Normocephalic and atraumatic.   Neck: Normal range of motion.   Pulmonary/Chest: Effort normal.   Musculoskeletal: Normal range of motion.   Neurological: she  is alert and oriented to person, place, and time.   Skin: she  is not diaphoretic.   Psychiatric: she  has a normal mood and affect. her  behavior is normal.      Labs:    Lab Results   Component Value Date    HGBA1C 5.6 11/02/2021 08/01/2024   Lab Results   Component Value Date    SODIUM 137 08/01/2024    K 4.0 08/01/2024     08/01/2024    CO2 28 08/01/2024    AGAP 6 08/01/2024    BUN 13 08/01/2024    CREATININE 0.67 08/01/2024    GLUC 79 12/04/2023    GLUF 74 08/01/2024    CALCIUM 9.2 08/01/2024    AST 19 08/01/2024    ALT 15 08/01/2024    ALKPHOS 79 " 08/01/2024    TP 7.1 08/01/2024    TBILI 0.53 08/01/2024    EGFR 111 08/01/2024      Lab Results   Component Value Date    FZQ2HIRQKATZ 0.390 (L) 08/01/2024    TSH 6.89 (H) 11/09/2021

## 2024-12-12 NOTE — PATIENT INSTRUCTIONS
ZEPBOUND:  I have sent Zebound to your pharmacy. The prior authorization process will been done through our prior authorization team and can take up to 2-3 weeks to process through the insurance.  Our office will contact you when prior auth has been obtained.  Please reach out to the office if you have not heard anything regarding medication coverage in 3 weeks.      - Start Zepbound 2.5 mg subcutaneously weekly. After you have taken the second pen, please give me an update, as we will likely increase the dose the next month if you are tolerating it well.  Then, you will stay on Zepbound 5mg until the next follow up visit.      - Side effects of Zepbound include nausea, vomiting, diarrhea, or constipation.   Keep an eye on your heart rate while on Zepbound. If you resting heart rate is greater than 100 beats per minutes, please notify me. If you develop severe abdominal pain, stop Zepbound and go to the emergency room, as that could be a sign of pancreatitis.      - Please notify me if you have surgery, upper endoscopy, or colonoscopy scheduled, as we typically hold Zepbound for one week prior to the procedure.      - Zepbound can reduce the effectiveness of oral hormonal birth control (birth control pills). Recommend a barrier backup method such as condoms to prevent pregnancy for 4 weeks after starting Zepbound and for 4 weeks after any dose increase.

## 2024-12-12 NOTE — TELEPHONE ENCOUNTER
Pharmacy informed pt that she needs a  prior auth for her Zepbound,  I explained the office is closed so they won't see this until tomorrow morning and it can take the office 3 days to submit a prior auth to her ins and then the ins has 7-21 business days to respond.  I told pt we will contact her when we hear back from her ins company

## 2024-12-13 ENCOUNTER — TELEPHONE (OUTPATIENT)
Dept: BARIATRICS | Facility: CLINIC | Age: 39
End: 2024-12-13

## 2024-12-13 NOTE — TELEPHONE ENCOUNTER
PA for Zepbound 2.5mg  APPROVED     Date(s) approved 12/13/24-12/13/25    Case #[id number]    Patient advised by          []MyChart Message  [x]Phone call   []LMOM  []L/M to call office as no active Communication consent on file  []Unable to leave detailed message as VM not approved on Communication consent       Pharmacy advised by    []Fax  [x]Phone call    Approval letter scanned into Media Yes

## 2024-12-13 NOTE — TELEPHONE ENCOUNTER
PA for Zepbound 2.5mg SUBMITTED to [facility]    via    [x]CMM-KEY: [id number]  []Surescripts-Case ID #    []Availity-Auth ID #  NDC #    []Faxed to plan   []Other website    []Phone call Case ID #      [x]PA sent as URGENT    All office notes, labs and other pertaining documents and studies sent. Clinical questions answered. Awaiting determination from insurance company.     Turnaround time for your insurance to make a decision on your Prior Authorization can take 7-21 business days.

## 2025-01-06 DIAGNOSIS — E66.811 OBESITY, CLASS I, BMI 30-34.9: ICD-10-CM

## 2025-01-06 NOTE — TELEPHONE ENCOUNTER
Reason for call:   [x] Refill   [] Prior Auth  [x] Other: Patient states used last injection 1/6/2025    Office:   [] PCP/Provider -   [x] Specialty/Provider - WEIGHT MANAGEMENT [facility]Y - Artem Belle PA-C     Medication: tirzepatide (Zepbound) 2.5 mg/0.5 mL auto-injector     Dose/Frequency: Inject 0.5 mL (2.5 mg total) under the skin once a week for 28 days     Quantity: 2 mL    Pharmacy: [facility]  [place] [address] [phone]    Does the patient have enough for 3 days?   [x] Yes   [] No - Send as HP to POD

## 2025-01-07 RX ORDER — TIRZEPATIDE 2.5 MG/.5ML
2.5 INJECTION, SOLUTION SUBCUTANEOUS WEEKLY
Qty: 2 ML | Refills: 0 | Status: SHIPPED | OUTPATIENT
Start: 2025-01-07 | End: 2025-01-15 | Stop reason: SDUPTHER

## 2025-01-14 NOTE — PROGRESS NOTES
Weight Management Medical Nutrition Assessment  Mehdi presented for a meal planning session-1/12. Today's weight is 205.9#. Goal to improve lifestyle and stay healthy. Expressed concerns about diabetes as her mom was diagnosed recently. On 2nd month of Zepbound (2.5mg). Reports fatigue. Started to use protein shakes and reduced intake of carbohydrates. Struggles with meals at home as her mom/ cook and food is offered to her. She will eat when she is not hungry. We discussed ways to be mindful and benefits of protein. She is using [id number] menu as a guide. Today we developed and reviewed a low calorie meal plan with her food preferences. Encouraged her to track protein intake.    Goal to be more active with her kids (join swimming classes) and incorporate movement.    Patient seen by Medical Provider in past 6 months:  yes  Requested to schedule appointment with Medical Provider: No      Anthropometric Measurements  Start Weight (#) & Date:  209# on 12/12/24  Current Weight (#): 205.9#  TBW % Change from start weight: 1.5%  Ideal Body Weight (#): 135#  Goal Weight (#): 170#  Highest: 250#  Lowest: 180#    Weight Loss History  Previous weight loss attempts: Weight Management Program in [place]    Food and Nutrition Related History  Wake up:  5AM   Bed Time: 9PM    Food Recall  Breakfast: (after waking) 1 cup coffee + crackers OR 1 slice of cheese   Snack: 9AM Premier protein shake   Lunch: 12:30PM protein + broccoli (dines at [facility] cafeteria)   Snack: skip  Dinner: 1 cup homemade soup (pasta + chicken or beef) OR 2 slices bread + lettuce + onion + tomatoes + protein  Snack: skip    Beverages: 4-5 bottles water, 1 cup coffee  Volume of beverage intake: at least 64oz    Weekends: On the go on weekends  Cravings: none reported   Trouble area of day: after work    Frequency of Eating out: every Saturday   Food restrictions: none  Cooking:  or mom   Food Shopping: self    Physical Activity Intake  Activity:  none currently  Frequency: none currently  Physical limitations/barriers to exercise: time, inflammation in hand    Estimated Needs  Energy  SECA: BMR: n/a      X 1.3 -1000 =  Janee Hagan Energy Needs: BMR : 1651   1-2# loss weekly sedentary:  981-1481             1-2# loss weekly lightly active: 1438-8541  Maintenance calories for sedentary activity level: 1981  Protein: 74-92g      (1.2-1.5g/kg IBW)  Fluid: 72oz     (35mL/kg IBW)    Nutrition Diagnosis  Yes;    Overweight/obesity  related to Food and nutrition related knowledge deficit as evidenced by  BMI more than normative standard for age and sex (obesity-grade I 30-34.9)       Nutrition Intervention    Nutrition Prescription  Calories: 2945-3873  Protein:   Fluid: 72    Meal Plan (Bobby/Pro/Carb)  Breakfast: 300/15  Snack: 100-150/15-30  Lunch: 400/25  Snack: 100-150/5  Dinner: 400/25  Snack:    Nutrition Education:    Calorie controlled menu  Lean protein food choices  Healthy snack options  Food journaling tips      Nutrition Counseling:  Strategies: meal planning, portion sizes, healthy snack choices, hydration, fiber intake, protein intake, exercise, food journal      Monitoring and Evaluation:  Evaluation criteria:  Energy Intake  Meet protein needs  Maintain adequate hydration  Monitor weekly weight  Meal planning/preparation  Food journal   Decreased portions at mealtimes and snacks  Physical activity     Barriers to learning:none  Readiness to change: Contemplation:  (Acknowledging that there is a problem but not yet ready or sure of wanting to make a change)  Comprehension: good  Expected Compliance: good

## 2025-01-15 DIAGNOSIS — E66.811 OBESITY, CLASS I, BMI 30-34.9: ICD-10-CM

## 2025-01-15 NOTE — TELEPHONE ENCOUNTER
PA for Zepbound 2.5mg SUBMITTED to [facility]    via    [x]CMM-KEY: [id number]  []Surescripts-Case ID #    []Availity-Auth ID #  NDC #    []Faxed to plan   []Other website    []Phone call Case ID #      [x]PA sent as URGENT    All office notes, labs and other pertaining documents and studies sent. Clinical questions answered. Awaiting determination from insurance company.     Turnaround time for your insurance to make a decision on your Prior Authorization can take 7-21 business days.

## 2025-01-15 NOTE — TELEPHONE ENCOUNTER
Reason for call: NOT A DUPLICATE. Patient is requesting a different pharmacy  [x] Refill   [] Prior Auth  [] Other:     Office:   [] PCP/Provider -   [x] Specialty/Provider - WM    Medication: tirzepatide (Zepbound) 2.5 mg/0.5 mL auto-injector     Dose/Frequency:  Inject 0.5 mL (2.5 mg total) under the skin once a week for 28 days     Quantity: 2 ml    Pharmacy: [facility] - [place] - [address]     Does the patient have enough for 3 days?   [] Yes   [x] No - Send as HP to POD

## 2025-01-15 NOTE — TELEPHONE ENCOUNTER
PA for Zepbound 2.5mg  APPROVED     Date(s) approved 1/15/25-/2/15/25    Case #     Patient advised by          []3D Control Systemshart Message  [x]Phone call   [x]LMOM  []L/M to call office as no active Communication consent on file  []Unable to leave detailed message as VM not approved on Communication consent       Pharmacy advised by    []Fax  [x]Phone call    Approval letter scanned into Media Yes

## 2025-01-16 ENCOUNTER — TELEPHONE (OUTPATIENT)
Age: 40
End: 2025-01-16

## 2025-01-16 ENCOUNTER — OFFICE VISIT (OUTPATIENT)
Dept: FAMILY MEDICINE CLINIC | Facility: CLINIC | Age: 40
End: 2025-01-16
Payer: COMMERCIAL

## 2025-01-16 VITALS
SYSTOLIC BLOOD PRESSURE: 112 MMHG | WEIGHT: 205 LBS | TEMPERATURE: 98 F | BODY MASS INDEX: 32.18 KG/M2 | DIASTOLIC BLOOD PRESSURE: 60 MMHG | HEIGHT: 67 IN | HEART RATE: 74 BPM | OXYGEN SATURATION: 100 %

## 2025-01-16 DIAGNOSIS — M65.4 TENOSYNOVITIS, DE QUERVAIN: Primary | ICD-10-CM

## 2025-01-16 DIAGNOSIS — E03.8 OTHER SPECIFIED HYPOTHYROIDISM: ICD-10-CM

## 2025-01-16 DIAGNOSIS — Z13.6 SCREENING FOR CARDIOVASCULAR CONDITION: ICD-10-CM

## 2025-01-16 DIAGNOSIS — Z13.1 SCREENING FOR DIABETES MELLITUS: ICD-10-CM

## 2025-01-16 PROCEDURE — 99213 OFFICE O/P EST LOW 20 MIN: CPT | Performed by: FAMILY MEDICINE

## 2025-01-16 RX ORDER — TIRZEPATIDE 2.5 MG/.5ML
2.5 INJECTION, SOLUTION SUBCUTANEOUS WEEKLY
Qty: 2 ML | Refills: 0 | Status: SHIPPED | OUTPATIENT
Start: 2025-01-16 | End: 2025-02-13

## 2025-01-16 NOTE — TELEPHONE ENCOUNTER
[facility] pharmacy calling asking if we can fax the patients last office visit and labs to [phone].

## 2025-01-17 NOTE — ASSESSMENT & PLAN NOTE
Lab Results   Component Value Date    [id number] 0.390 (L) 08/01/2024    TSH 6.89 (H) 11/09/2021     Due for repeat tsh level  Continue levothyroxine 125mcg  Orders:  •  TSH, 3rd generation with Free T4 reflex; Future

## 2025-01-17 NOTE — ASSESSMENT & PLAN NOTE
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

## 2025-01-17 NOTE — PROGRESS NOTES
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  Had improvement with CSI in the past  Does not want to continue to get steroid injections  Unable to wear brace at work due to having to wear tight fitting gloves  Will refer patient to hand ortho  Orders:  •  Ambulatory Referral to Orthopedic Surgery; Future    Screening for diabetes mellitus    Orders:  •  Comprehensive metabolic panel; Future  •  Hemoglobin A1C; Future    Screening for cardiovascular condition    Orders:  •  Lipid panel; Future    Other specified hypothyroidism  Lab Results   Component Value Date    [id number] 0.390 (L) 2024    TSH 6.89 (H) 2021     Due for repeat tsh level  Continue levothyroxine 125mcg  Orders:  •  TSH, 3rd generation with Free T4 reflex; Future           History of Present Illness     HPI presents today for recurrent de quervains tenosynovitis.    Review of Systems   Constitutional:  Negative for activity change, chills, diaphoresis and fever.   HENT:  Negative for ear pain, hearing loss, postnasal drip, rhinorrhea, sinus pressure, sinus pain, sneezing and sore throat.    Respiratory:  Negative for cough, chest tightness, shortness of breath and wheezing.    Cardiovascular:  Negative for chest pain, palpitations and leg swelling.   Gastrointestinal:  Negative for abdominal pain, blood in stool, constipation, diarrhea, nausea and vomiting.   Genitourinary:  Negative for dysuria, frequency, hematuria and urgency.   Musculoskeletal:  Positive for arthralgias. Negative for myalgias.   Neurological:  Negative for dizziness, syncope, weakness, light-headedness, numbness and  "headaches.       Objective   /60 (BP Location: Left arm, Patient Position: Sitting, Cuff Size: Adult)   Pulse 74   Temp 98 °F (36.7 °C) (Temporal)   Ht 5' 7\" (1.702 m)   Wt 93 kg (205 lb)   SpO2 100%   BMI 32.11 kg/m²      Physical Exam  Constitutional:       General: She is not in acute distress.     Appearance: Normal appearance. She is well-developed. She is not diaphoretic.   HENT:      Head: Normocephalic and atraumatic.      Right Ear: Tympanic membrane, ear canal and external ear normal. There is no impacted cerumen.      Left Ear: Tympanic membrane, ear canal and external ear normal. There is no impacted cerumen.      Nose: Nose normal. No congestion or rhinorrhea.      Mouth/Throat:      Mouth: Mucous membranes are moist.      Pharynx: Oropharynx is clear. No oropharyngeal exudate.   Eyes:      Conjunctiva/sclera: Conjunctivae normal.      Pupils: Pupils are equal, round, and reactive to light.   Neck:      Vascular: No JVD.   Cardiovascular:      Rate and Rhythm: Normal rate and regular rhythm.      Heart sounds: Normal heart sounds. No murmur heard.     No friction rub. No gallop.   Pulmonary:      Effort: Pulmonary effort is normal. No respiratory distress.      Breath sounds: Normal breath sounds. No wheezing or rales.   Chest:      Chest wall: No tenderness.   Abdominal:      General: Bowel sounds are normal. There is no distension.      Palpations: Abdomen is soft.      Tenderness: There is no abdominal tenderness. There is no right CVA tenderness, left CVA tenderness, guarding or rebound.   Musculoskeletal:         General: Normal range of motion.      Left wrist: Tenderness present. No snuff box tenderness.      Cervical back: No tenderness.      Comments: Positive finkelstein on L   Lymphadenopathy:      Cervical: No cervical adenopathy.   Skin:     General: Skin is warm and dry.   Neurological:      Mental Status: She is alert and oriented to person, place, and time.      Cranial " Nerves: No cranial nerve deficit.   Psychiatric:         Mood and Affect: Mood and affect normal.         Behavior: Behavior normal.

## 2025-01-20 ENCOUNTER — OFFICE VISIT (OUTPATIENT)
Dept: OBGYN CLINIC | Facility: CLINIC | Age: 40
End: 2025-01-20
Payer: COMMERCIAL

## 2025-01-20 VITALS — WEIGHT: 205 LBS | HEIGHT: 67 IN | BODY MASS INDEX: 32.18 KG/M2

## 2025-01-20 DIAGNOSIS — M65.4 TENOSYNOVITIS, DE QUERVAIN: ICD-10-CM

## 2025-01-20 PROCEDURE — 99203 OFFICE O/P NEW LOW 30 MIN: CPT | Performed by: SURGERY

## 2025-01-20 NOTE — PROGRESS NOTES
ORTHOPAEDIC HAND, WRIST, AND ELBOW OFFICE  VISIT       ASSESSMENT/PLAN:      39 y.o. year old female who presents with Left DeQuervain's tendinitis    Physical exam was performed and we discussed the findings  We discussed another injection can be offered. She declined  Will start bracing at night. Discussed it can take 6 weeks to see improvement  Briefly discussed surgery  NSAID's as needed      The patient verbalized understanding of exam findings and treatment plan. We engaged in the shared decision-making process and treatment options were discussed at length with the patient. Surgical and conservative management discussed today along with risks and benefits.    Diagnoses and all orders for this visit:    Tenosynovitis, de Quervain  -     Ambulatory Referral to Orthopedic Surgery        Follow Up:  Return in about 6 weeks (around 3/3/2025) for Recheck.    To Do Next Visit:  Re-evaluation of current issue      General Discussions:  De Quervain Tenosynovitis: The anatomy and physiology of de Quervain's tenosynovitis was discussed with the patient today in the office.  Edema and increased contact pressure within the first dorsal extensor compartment at the radial styloid can cause pain, crepitation, and limitation of function.  Treatment options include resting thumb spica splints to decrease edema, oral anti-inflammatory medications, home or formal therapy exercises, up to 2 steroid injections within the first dorsal extensor compartment, or surgical release.  While majority of patients do respond to conservative treatment, up to 20% may require surgical release.           ____________________________________________________________________________________________________________________________________________      CHIEF COMPLAINT:  Chief Complaint   Patient presents with    Left Wrist - Pain       SUBJECTIVE:  Mehdi Nuñez is a 39 y.o. year old  female who presents for evaluation of left        Patient states she has had Left radial wrist pain for one year now. 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 No known injury. No numbness  She did receive an injection 5/6/2024 from her PCP and this gave her 3 months relief. Pain then came back worse after. She states the PCP recommended surgery  She just ordered and new brace that covers her thumb and wrist as she has tried bracing but feel the brace was the wrong one        I have personally reviewed all the relevant PMH, PSH, SH, FH, Medications and allergies      PAST MEDICAL HISTORY:  Past Medical History:   Diagnosis Date    Hyperthyroidism     Hypothyroidism        PAST SURGICAL HISTORY:  History reviewed. No pertinent surgical history.    FAMILY HISTORY:  Family History   Problem Relation Age of Onset    Diabetes Mother     No Known Problems Half-Sister     No Known Problems Brother     No Known Problems Daughter     No Known Problems Maternal Grandmother     No Known Problems Paternal Grandmother     No Known Problems Paternal Grandfather        SOCIAL HISTORY:  Social History     Tobacco Use    Smoking status: Never     Passive exposure: Never    Smokeless tobacco: Never   Vaping Use    Vaping status: Never Used   Substance Use Topics    Alcohol use: Never     Alcohol/week: 2.0 standard drinks of alcohol     Types: 2 Glasses of wine per week    Drug use: Never       MEDICATIONS:    Current Outpatient Medications:     hydrocortisone 1 % cream, Apply 1 Application topically daily as needed for irritation or rash, Disp: , Rfl: 0    levothyroxine 125 mcg tablet, TAKE 1 TABLET BY MOUTH EVERY DAY, Disp: 90 tablet, Rfl: 1    tirzepatide (Zepbound) 2.5 mg/0.5 mL auto-injector, Inject 0.5 mL (2.5 mg total) under the skin once a week for 28 days, Disp: 2 mL, Rfl: 0    medroxyPROGESTERone (PROVERA) 10 mg tablet, Take 1 tablet (10 mg total) by mouth daily for 10 days (Patient not taking: Reported on 12/12/2024), Disp: 10 tablet, Rfl:  0    norethindrone-ethinyl estradiol (Junel 1/20) 1-20 MG-MCG per tablet, Take 1 tablet by mouth daily (Patient not taking: Reported on 1/20/2025), Disp: 84 tablet, Rfl: 1    ALLERGIES:  No Known Allergies        REVIEW OF SYSTEMS:  Review of Systems   Constitutional:  Negative for chills and fever.   HENT:  Negative for ear pain and sore throat.    Eyes:  Negative for pain and visual disturbance.   Respiratory:  Negative for cough and shortness of breath.    Cardiovascular:  Negative for chest pain and palpitations.   Gastrointestinal:  Negative for abdominal pain and vomiting.   Genitourinary:  Negative for dysuria and hematuria.   Musculoskeletal:  Negative for arthralgias and back pain.   Skin:  Negative for color change and rash.   Neurological:  Negative for seizures and syncope.   All other systems reviewed and are negative.      VITALS:  There were no vitals filed for this visit.    LABS:  HgA1c:   Lab Results   Component Value Date    HGBA1C 5.6 11/02/2021     BMP:   Lab Results   Component Value Date    CALCIUM 9.2 08/01/2024    K 4.0 08/01/2024    CO2 28 08/01/2024     08/01/2024    BUN 13 08/01/2024    CREATININE 0.67 08/01/2024       _____________________________________________________  PHYSICAL EXAMINATION:  General: well developed and well nourished, alert, oriented times 3, and appears comfortable  Psychiatric: Normal  HEENT: Normocephalic, Atraumatic Trachea Midline, No torticollis  Pulmonary: No audible wheezing or respiratory distress   Abdomen/GI: Non tender, non distended   Cardiovascular: No pitting edema, 2+ radial pulse   Skin: No masses, erythema, lacerations, fluctation, ulcerations  Neurovascular: Sensation Intact to the Median, Ulnar, Radial Nerve, Motor Intact to the Median, Ulnar, Radial Nerve, and Pulses Intact  Musculoskeletal: Normal, except as noted in detailed exam and in HPI.      MUSCULOSKELETAL EXAMINATION:  Right hand:  SILT  Composite fist      Left  hand:  SILT  Composite fist    Positive tender to palpation over 1st dorsal extensor compartment   Positive Finkelstein's test      ___________________________________________________  STUDIES REVIEWED:    No new images obtained/reviewed      PROCEDURES PERFORMED:  Procedures  No Procedures performed today    _____________________________________________________      Scribe Attestation      I,:  Junior Restrepo am acting as a scribe while in the presence of the attending physician.:       I,:  Hugo Simental MD personally performed the services described in this documentation    as scribed in my presence.:

## 2025-01-21 ENCOUNTER — CLINICAL SUPPORT (OUTPATIENT)
Dept: BARIATRICS | Facility: CLINIC | Age: 40
End: 2025-01-21
Payer: COMMERCIAL

## 2025-01-21 VITALS — BODY MASS INDEX: 32.31 KG/M2 | HEIGHT: 67 IN | WEIGHT: 205.9 LBS

## 2025-01-21 DIAGNOSIS — E66.09 CLASS 1 OBESITY DUE TO EXCESS CALORIES WITH SERIOUS COMORBIDITY AND BODY MASS INDEX (BMI) OF 32.0 TO 32.9 IN ADULT: Primary | ICD-10-CM

## 2025-01-21 DIAGNOSIS — E66.811 CLASS 1 OBESITY DUE TO EXCESS CALORIES WITH SERIOUS COMORBIDITY AND BODY MASS INDEX (BMI) OF 32.0 TO 32.9 IN ADULT: Primary | ICD-10-CM

## 2025-01-21 PROCEDURE — RECHECK

## 2025-01-21 PROCEDURE — S9470 NUTRITIONAL COUNSELING, DIET: HCPCS

## 2025-01-23 ENCOUNTER — OFFICE VISIT (OUTPATIENT)
Dept: FAMILY MEDICINE CLINIC | Facility: CLINIC | Age: 40
End: 2025-01-23
Payer: COMMERCIAL

## 2025-01-23 VITALS
HEIGHT: 67 IN | DIASTOLIC BLOOD PRESSURE: 80 MMHG | BODY MASS INDEX: 31.61 KG/M2 | WEIGHT: 201.4 LBS | TEMPERATURE: 97.7 F | HEART RATE: 84 BPM | OXYGEN SATURATION: 99 % | SYSTOLIC BLOOD PRESSURE: 120 MMHG

## 2025-01-23 DIAGNOSIS — M65.4 TENOSYNOVITIS, DE QUERVAIN: ICD-10-CM

## 2025-01-23 DIAGNOSIS — Z12.31 ENCOUNTER FOR SCREENING MAMMOGRAM FOR BREAST CANCER: ICD-10-CM

## 2025-01-23 DIAGNOSIS — K52.9 ENTERITIS: Primary | ICD-10-CM

## 2025-01-23 DIAGNOSIS — R42 LIGHTHEADEDNESS: ICD-10-CM

## 2025-01-23 PROCEDURE — 99213 OFFICE O/P EST LOW 20 MIN: CPT | Performed by: FAMILY MEDICINE

## 2025-01-23 NOTE — PROGRESS NOTES
"Name: Mehdi Nuñez      : 1985      MRN: [id number]  Encounter Provider: Lee Kwon DO  Encounter Date: 2025   Encounter department: [facility]  :  Assessment & Plan  Enteritis  New  Day 1 of symptoms  4 episodes of diarrhea per day  Likely viral  Encouraged hydration  Avoid anti-diarrheals  If symptoms fail to resolve, rtc       Encounter for screening mammogram for breast cancer    Orders:  •  Mammo screening bilateral w cad; Future    Lightheadedness    Orders:  •  CBC and differential; Future    Tenosynovitis, de Quervain  Patient has plans to switch jobs due to recurrent symptoms  Paper work provided              History of Present Illness   HPI presents today for follow up for left dequervain's tenosynovitis. She does not want to do surgery and her job cannot accommodate wrist brace due to requirement to wear sterile gloves.    Started having diarrhea today. Has 4 episodes this morning. No sick contacts. No nausea or vomiting.     Review of Systems   Constitutional:  Negative for activity change, chills, diaphoresis and fever.   HENT:  Negative for ear pain, hearing loss, postnasal drip, rhinorrhea, sinus pressure, sinus pain, sneezing and sore throat.    Respiratory:  Negative for cough, chest tightness, shortness of breath and wheezing.    Cardiovascular:  Negative for chest pain, palpitations and leg swelling.   Gastrointestinal:  Positive for diarrhea. Negative for abdominal pain, blood in stool, constipation, nausea and vomiting.   Genitourinary:  Negative for dysuria, frequency, hematuria and urgency.   Musculoskeletal:  Positive for arthralgias. Negative for myalgias.   Neurological:  Negative for dizziness, syncope, weakness, light-headedness, numbness and headaches.       Objective   /80 (BP Location: Left arm, Patient Position: Sitting, Cuff Size: Standard)   Pulse 84   Temp 97.7 °F (36.5 °C) (Temporal)   Ht 5' 7\" " (1.702 m)   Wt 91.4 kg (201 lb 6.4 oz)   SpO2 99%   BMI 31.54 kg/m²      Physical Exam  Constitutional:       General: She is not in acute distress.     Appearance: Normal appearance. She is well-developed. She is not diaphoretic.   HENT:      Head: Normocephalic and atraumatic.      Right Ear: Tympanic membrane, ear canal and external ear normal. There is no impacted cerumen.      Left Ear: Tympanic membrane, ear canal and external ear normal. There is no impacted cerumen.      Nose: Nose normal. No congestion or rhinorrhea.      Mouth/Throat:      Mouth: Mucous membranes are moist.      Pharynx: Oropharynx is clear. No oropharyngeal exudate.   Eyes:      Conjunctiva/sclera: Conjunctivae normal.      Pupils: Pupils are equal, round, and reactive to light.   Neck:      Vascular: No JVD.   Cardiovascular:      Rate and Rhythm: Normal rate and regular rhythm.      Heart sounds: Normal heart sounds. No murmur heard.     No friction rub. No gallop.   Pulmonary:      Effort: Pulmonary effort is normal. No respiratory distress.      Breath sounds: Normal breath sounds. No wheezing or rales.   Chest:      Chest wall: No tenderness.   Abdominal:      General: Bowel sounds are normal. There is no distension.      Palpations: Abdomen is soft.      Tenderness: There is no abdominal tenderness. There is no right CVA tenderness, left CVA tenderness, guarding or rebound.   Musculoskeletal:         General: No tenderness. Normal range of motion.      Cervical back: No tenderness.   Lymphadenopathy:      Cervical: No cervical adenopathy.   Skin:     General: Skin is warm and dry.   Neurological:      Mental Status: She is alert and oriented to person, place, and time.      Cranial Nerves: No cranial nerve deficit.   Psychiatric:         Mood and Affect: Mood and affect normal.         Behavior: Behavior normal.

## 2025-01-23 NOTE — LETTER
January 23, 2025     Patient: Mehdi Nuñez  YOB: 1985  Date of Visit: 1/23/2025      To Whom it May Concern:    Mehdi Nuñez is under my professional care. Mehdi was seen in my office on 1/23/2025 for enteritis. Mehdi may return to work on 1/27/2025 when symptoms resolve .    If you have any questions or concerns, please don't hesitate to call.         Sincerely,          Lee Kwon DO        CC: No Recipients

## 2025-01-27 ENCOUNTER — TELEPHONE (OUTPATIENT)
Age: 40
End: 2025-01-27

## 2025-01-27 ENCOUNTER — TELEPHONE (OUTPATIENT)
Dept: FAMILY MEDICINE CLINIC | Facility: CLINIC | Age: 40
End: 2025-01-27

## 2025-01-27 NOTE — TELEPHONE ENCOUNTER
Patient called in regards to dropping paperwork off in the office on 1/23 and would like to know if paperwork is ready for . Patient stated that she need paperwork filled out before 2/2.

## 2025-01-27 NOTE — TELEPHONE ENCOUNTER
Patient called in regards to being seen on  1/23 and she had a stomach virus. Patient stated that she is still having stomach pain, patient would like to know if provider would be able to prescribe her medication to help with the pain.

## 2025-02-05 ENCOUNTER — NURSE TRIAGE (OUTPATIENT)
Age: 40
End: 2025-02-05

## 2025-02-05 NOTE — TELEPHONE ENCOUNTER
"Pt calling in to relay concern for diarrhea on and off past x3 weeks on zepbound, states she has never experienced side effects before. Notes at first thought was stomach bug. Staying very hydrated and trying to watch what she eats. Came back again this Monday after eating beans, last night waking up at night to go to bathroom. Adds she has one more injection before refill needed but will see Artem first on 2/11 for f/u.     Reviewed advice to increase water intake to about 80oz/day, pt reports she has been drinking electrolyte drinks. Reviewed to be careful with drinks containing artificial sugars. Reviewed brat diet until sx resolve. Pt will also try to avoid coffee/dairy. Encouraged to make these changes and cb with any worsening or continuing of sx. Pt agreed, no other questions at this time.    Reason for Disposition   MILD-MODERATE diarrhea (e.g., 1-6 times / day more than normal)    Answer Assessment - Initial Assessment Questions  1. DIARRHEA SEVERITY: \"How bad is the diarrhea?\" \"How many more stools have you had in the past 24 hours than normal?\"       3  2. ONSET: \"When did the diarrhea begin?\"       About 3 weeks ago  3. STOOL DESCRIPTION:  \"How loose or watery is the diarrhea?\" \"What is the stool color?\" \"Is there any blood or mucous in the stool?\"      diarrhea  4. VOMITING: \"Are you also vomiting?\" If Yes, ask: \"How many times in the past 24 hours?\"       no  5. ABDOMEN PAIN: \"Are you having any abdomen pain?\" If Yes, ask: \"What does it feel like?\" (e.g., crampy, dull, intermittent, constant)       yes  6. ABDOMEN PAIN SEVERITY: If present, ask: \"How bad is the pain?\"  (e.g., Scale 1-10; mild, moderate, or severe)      cramp  7. ORAL INTAKE: If vomiting, \"Have you been able to drink liquids?\" \"How much liquids have you had in the past 24 hours?\"      yes  8. HYDRATION: \"Any signs of dehydration?\" (e.g., dry mouth [not just dry lips], too weak to stand, dizziness, new weight loss) \"When did you " "last urinate?\"      no  9. EXPOSURE: \"Have you traveled to a foreign country recently?\" \"Have you been exposed to anyone with diarrhea?\" \"Could you have eaten any food that was spoiled?\"      no  10. ANTIBIOTIC USE: \"Are you taking antibiotics now or have you taken antibiotics in the past 2 months?\"        no  11. OTHER SYMPTOMS: \"Do you have any other symptoms?\" (e.g., fever, blood in stool)        On zepbound    Protocols used: Diarrhea-Adult-[place]    "

## 2025-02-11 ENCOUNTER — OFFICE VISIT (OUTPATIENT)
Dept: BARIATRICS | Facility: CLINIC | Age: 40
End: 2025-02-11
Payer: COMMERCIAL

## 2025-02-11 VITALS
TEMPERATURE: 97.5 F | RESPIRATION RATE: 16 BRPM | HEART RATE: 91 BPM | BODY MASS INDEX: 31.33 KG/M2 | SYSTOLIC BLOOD PRESSURE: 118 MMHG | WEIGHT: 199.6 LBS | HEIGHT: 67 IN | DIASTOLIC BLOOD PRESSURE: 76 MMHG

## 2025-02-11 DIAGNOSIS — E66.811 OBESITY, CLASS I, BMI 30-34.9: Primary | ICD-10-CM

## 2025-02-11 DIAGNOSIS — E03.8 OTHER SPECIFIED HYPOTHYROIDISM: ICD-10-CM

## 2025-02-11 PROCEDURE — 99214 OFFICE O/P EST MOD 30 MIN: CPT | Performed by: PHYSICIAN ASSISTANT

## 2025-02-11 RX ORDER — TIRZEPATIDE 5 MG/.5ML
5 INJECTION, SOLUTION SUBCUTANEOUS WEEKLY
Qty: 2 ML | Refills: 3 | Status: SHIPPED | OUTPATIENT
Start: 2025-02-11

## 2025-02-11 NOTE — PROGRESS NOTES
Assessment & Plan  Obesity, Class I, BMI 30-34.9  Continue Zepbound-- Will increase to 5mg weekly and continue with this dose until the next visit.   She has had 10 pound weight loss since starting Zepbound (total 8 weeks on 2.5mg dose)  Continue current Nutrition plan and follow up with RD as scheduled 2/25/2025  Continue regular exercise- discussed importance of strength training. Upper body strength training on hold due to wrist injury, recommend core/lower body strength training 2x per week  Discussed importance of use of reliable birth control while on Zepbound and use of back up contraception if sexually active  Other specified hypothyroidism  Last TSH was low and she has had weight loss since that time  Advised her to complete repeat testing ordered by PCP       Return in about 3 months (around 5/11/2025).         Subjective:   Chief Complaint   Patient presents with   • Follow-up     Mwm f/u 2m: waist: 43in       Patient ID: Mehdi Nuñez  is a 39 y.o. female with excess weight/obesity here for Weight Management Follow up Visit    HPI: Here for Medical Weight Management Follow Up Visit    Obesity/Excess Weight:  Current BMI: Body mass index is 31.26 kg/m².   Initial Weight: 209  Last visit Weight: 209  Current Weight: 199    Current Weight Management Plan:   Current Medication: Zepound 2.5mg, took last dose on Sunday.   Medication Side effects:  a few weeks ago had diarrhea, This has resolved.   Noticing  Less frequent Bms, but stools are soft  and not hard to pass  Food Logging: not currently, but did find it to be helpful when she was logging.     Met with Kathleen Brown RD 1/21/2025, has follow up 2/25  Nutrition Prescription  Calories: 3470-2849  Protein:   Fluid: 72    Food Recall:  Breakfast: Protein bar and coffee, sometimes with 1% milk  Sometimes will have oatmeal or 2 hard boiled eggs  AM Snack: Protein shake or Fruit or Sugar Free Jello  Lunch: Sometimes a sandwich (tuna)  PM  "Snack: sometimes an apple or banana, sometimes none  Dinner: Veggies (salad) with chicken or lisa    Hydration: 5 bottle per day (each 16 ounches)      Lifestyle History:  Exercise: Tries to walk 30 mins on the weekend, jump rope 3x per week   Currently not strength training due to wrist problem, may need surgery.   Occupation: Works at [employer]     OCP on list, not currently taking, not currently sexually active, may consider resuming OCP    Wt Readings from Last 20 Encounters:   02/11/25 90.5 kg (199 lb 9.6 oz)   01/23/25 91.4 kg (201 lb 6.4 oz)   01/21/25 93.4 kg (205 lb 14.4 oz)   01/20/25 93 kg (205 lb)   01/16/25 93 kg (205 lb)   12/12/24 95 kg (209 lb 6.4 oz)   07/30/24 94.5 kg (208 lb 6.4 oz)   05/06/24 95.7 kg (211 lb)   04/16/24 96.6 kg (213 lb)   03/29/24 95.3 kg (210 lb)   03/01/24 97.3 kg (214 lb 6.4 oz)   12/04/23 108 kg (238 lb 1.6 oz)   11/30/23 108 kg (237 lb)   11/16/23 107 kg (235 lb)   11/15/23 106 kg (234 lb)   11/01/23 106 kg (233 lb)   10/19/23 105 kg (230 lb 6.4 oz)   10/17/23 105 kg (231 lb 6.4 oz)   10/04/23 102 kg (225 lb 9.6 oz)   09/06/23 98.4 kg (217 lb)        Review of Systems   Genitourinary:  Vaginal pain: wrist.   Musculoskeletal:  Positive for arthralgias.       Past Medical History:   Diagnosis Date   • Hyperthyroidism    • Hypothyroidism        History reviewed. No pertinent surgical history.     No Known Allergies     Objective:    /76   Pulse 91   Temp 97.5 °F (36.4 °C)   Resp 16   Ht 5' 7\" (1.702 m)   Wt 90.5 kg (199 lb 9.6 oz)   LMP 01/19/2025   BMI 31.26 kg/m²     Physical Exam:  Constitutional:  she  appears well-developed and well-nourished. No distress.   HENT:   Head: Normocephalic and atraumatic.   Neck: Normal range of motion.   Pulmonary/Chest: Effort normal.   Musculoskeletal: Normal range of motion.   Neurological: she  is alert and oriented to person, place, and time.   Skin: she  is not diaphoretic.   Psychiatric: she  has a normal mood " and affect. her  behavior is normal.        LABS:    Lab Results   Component Value Date    HGBA1C 5.6 11/02/2021      Lab Results   Component Value Date    SODIUM 137 08/01/2024    K 4.0 08/01/2024     08/01/2024    CO2 28 08/01/2024    AGAP 6 08/01/2024    BUN 13 08/01/2024    CREATININE 0.67 08/01/2024    GLUC 79 12/04/2023    GLUF 74 08/01/2024    CALCIUM 9.2 08/01/2024    AST 19 08/01/2024    ALT 15 08/01/2024    ALKPHOS 79 08/01/2024    TP 7.1 08/01/2024    TBILI 0.53 08/01/2024    EGFR 111 08/01/2024 08/01/2024   Lab Results   Component Value Date    JNJ6EWPDFCPK 0.390 (L) 08/01/2024    TSH 6.89 (H) 11/09/2021

## 2025-02-11 NOTE — ASSESSMENT & PLAN NOTE
Last TSH was low and she has had weight loss since that time  Advised her to complete repeat testing ordered by PCP

## 2025-02-14 ENCOUNTER — TELEPHONE (OUTPATIENT)
Age: 40
End: 2025-02-14

## 2025-02-14 NOTE — TELEPHONE ENCOUNTER
Patient called in and is looking for a follow up on forms that were dropped off on 2/11. Please advise

## 2025-02-18 ENCOUNTER — TELEPHONE (OUTPATIENT)
Age: 40
End: 2025-02-18

## 2025-02-18 NOTE — TELEPHONE ENCOUNTER
Manan from [facility] [employer] called because they received the patients FMLA forms but they are not filled out correctly. The intermittent section was filled out and patient is not on an intermittent leave.  Manan stated that the fully out dates need to be filled in for continuous out leave of absence.     Please fax to [phone] once completed

## 2025-02-25 ENCOUNTER — TELEPHONE (OUTPATIENT)
Dept: BARIATRICS | Facility: CLINIC | Age: 40
End: 2025-02-25

## 2025-02-28 ENCOUNTER — TELEPHONE (OUTPATIENT)
Dept: FAMILY MEDICINE CLINIC | Facility: CLINIC | Age: 40
End: 2025-02-28

## 2025-02-28 NOTE — TELEPHONE ENCOUNTER
Regarding FMLA forms  she has been out from 1/27/2025 until now   no return date given   will fax or drop off new form to be filled out

## 2025-03-17 ENCOUNTER — TELEPHONE (OUTPATIENT)
Age: 40
End: 2025-03-17

## 2025-03-17 NOTE — TELEPHONE ENCOUNTER
Patient needs FMLA paperwork corrected. Was not sure which part needs corrected. Will call back with info.

## 2025-03-17 NOTE — TELEPHONE ENCOUNTER
Steff from [facility] [employer] called inquiring about the most recent forms. She faxed over an updated form that needs to be completed on her next appointment 4/17/2025.

## 2025-03-20 NOTE — TELEPHONE ENCOUNTER
Patient called to enquire about status of [employer] paperwork.  She was in the office yesterday and spoke with the  about additional items that needed to be completed.  Advised patient that it normally takes 5-7 days for paperwork to be completed and she will be contacted once it is done. Understanding verbalized.

## 2025-03-21 ENCOUNTER — TELEPHONE (OUTPATIENT)
Age: 40
End: 2025-03-21

## 2025-03-21 ENCOUNTER — OFFICE VISIT (OUTPATIENT)
Dept: FAMILY MEDICINE CLINIC | Facility: CLINIC | Age: 40
End: 2025-03-21
Payer: COMMERCIAL

## 2025-03-21 VITALS
SYSTOLIC BLOOD PRESSURE: 118 MMHG | TEMPERATURE: 97.8 F | HEART RATE: 93 BPM | WEIGHT: 199.2 LBS | OXYGEN SATURATION: 100 % | HEIGHT: 67 IN | DIASTOLIC BLOOD PRESSURE: 76 MMHG | BODY MASS INDEX: 31.27 KG/M2

## 2025-03-21 DIAGNOSIS — M65.4 TENOSYNOVITIS, DE QUERVAIN: Primary | ICD-10-CM

## 2025-03-21 PROCEDURE — 99213 OFFICE O/P EST LOW 20 MIN: CPT | Performed by: FAMILY MEDICINE

## 2025-03-21 NOTE — ASSESSMENT & PLAN NOTE
Unchanged  Continues to have pain and discomfort   Received CSI by ortho  Recurs due to repetitive lifting at job  Unable to wear wrist brace during work  No restriction with pushing or pulling as long as she can wear brace  Patient may resume work pending ot evaluation  Referral to PT/OT for further treatment and evaluation.    Orders:  •  Ambulatory Referral to Occupational Therapy; Future  •  Ambulatory Referral to Physical Therapy; Future

## 2025-03-21 NOTE — PROGRESS NOTES
"Name: Mehdi Nuñez      : 1985      MRN: [id number]  Encounter Provider: Lee Kwon DO  Encounter Date: 3/21/2025   Encounter department: [facility]  :  Assessment & Plan  Tenosynovitis, de Quervain  Unchanged  Continues to have pain and discomfort   Received CSI by ortho  Recurs due to repetitive lifting at job  Unable to wear wrist brace during work  No restriction with pushing or pulling as long as she can wear brace  Patient may resume work pending ot evaluation  Referral to PT/OT for further treatment and evaluation.    Orders:  •  Ambulatory Referral to Occupational Therapy; Future  •  Ambulatory Referral to Physical Therapy; Future           History of Present Illness   HPI presents today to discuss her wrist pain. Has not been working the past 2 months. Job requires more information to determine course of action for patient.    Review of Systems   Constitutional:  Negative for activity change, chills, diaphoresis and fever.   HENT:  Negative for ear pain, hearing loss, postnasal drip, rhinorrhea, sinus pressure, sinus pain, sneezing and sore throat.    Respiratory:  Negative for cough, chest tightness, shortness of breath and wheezing.    Cardiovascular:  Negative for chest pain, palpitations and leg swelling.   Gastrointestinal:  Negative for abdominal pain, blood in stool, constipation, diarrhea, nausea and vomiting.   Genitourinary:  Negative for dysuria, frequency, hematuria and urgency.   Musculoskeletal:  Positive for arthralgias. Negative for myalgias.   Neurological:  Negative for dizziness, syncope, weakness, light-headedness, numbness and headaches.       Objective   /76 (BP Location: Left arm, Patient Position: Sitting, Cuff Size: Adult)   Pulse 93   Temp 97.8 °F (36.6 °C) (Temporal)   Ht 5' 7\" (1.702 m)   Wt 90.4 kg (199 lb 3.2 oz)   SpO2 100%   BMI 31.20 kg/m²      Physical Exam  Constitutional:       General: She " is not in acute distress.     Appearance: Normal appearance. She is well-developed. She is not diaphoretic.   HENT:      Head: Normocephalic and atraumatic.      Right Ear: Tympanic membrane, ear canal and external ear normal. There is no impacted cerumen.      Left Ear: Tympanic membrane, ear canal and external ear normal. There is no impacted cerumen.      Nose: Nose normal. No congestion or rhinorrhea.      Mouth/Throat:      Mouth: Mucous membranes are moist.      Pharynx: Oropharynx is clear. No oropharyngeal exudate.   Eyes:      Conjunctiva/sclera: Conjunctivae normal.      Pupils: Pupils are equal, round, and reactive to light.   Neck:      Vascular: No JVD.   Cardiovascular:      Rate and Rhythm: Normal rate and regular rhythm.      Heart sounds: Normal heart sounds. No murmur heard.     No friction rub. No gallop.   Pulmonary:      Effort: Pulmonary effort is normal. No respiratory distress.      Breath sounds: Normal breath sounds. No wheezing or rales.   Chest:      Chest wall: No tenderness.   Abdominal:      General: Bowel sounds are normal. There is no distension.      Palpations: Abdomen is soft.      Tenderness: There is no abdominal tenderness. There is no right CVA tenderness, left CVA tenderness, guarding or rebound.   Musculoskeletal:         General: No tenderness. Normal range of motion.        Hands:       Cervical back: No tenderness.   Lymphadenopathy:      Cervical: No cervical adenopathy.   Skin:     General: Skin is warm and dry.   Neurological:      Mental Status: She is alert and oriented to person, place, and time.      Cranial Nerves: No cranial nerve deficit.   Psychiatric:         Mood and Affect: Mood and affect normal.         Behavior: Behavior normal.

## 2025-03-24 ENCOUNTER — EVALUATION (OUTPATIENT)
Dept: OCCUPATIONAL THERAPY | Age: 40
End: 2025-03-24
Payer: COMMERCIAL

## 2025-03-24 DIAGNOSIS — M65.4 TENOSYNOVITIS, DE QUERVAIN: ICD-10-CM

## 2025-03-24 PROCEDURE — 97140 MANUAL THERAPY 1/> REGIONS: CPT

## 2025-03-24 PROCEDURE — 97165 OT EVAL LOW COMPLEX 30 MIN: CPT

## 2025-03-24 NOTE — PROGRESS NOTES
OT Evaluation     Today's date: 3/24/2025  Patient name: Mehdi Nuñez  : 1985  MRN: [id number]  Referring provider: Lee Kwon*  Dx:   Encounter Diagnosis     ICD-10-CM    1. Tenosynovitis, de Quervain  M65.4 Ambulatory Referral to Occupational Therapy          Start Time: 1455  Stop Time: 1525  Total time in clinic (min): 30 minutes    Assessment  Impairments: abnormal or restricted ROM, activity intolerance, impaired physical strength, lacks appropriate home exercise program and pain with function    Assessment details: Pt is a 40 y/o RHD female presenting to OT eval for left DeQuervian's tenosynovitis. She c/o intermittent but significant pain over the first dorsal compartment of her left wrist. She has pain with basic daily tasks and is unable to work due to the pain and nature of her work. She needs to be able to lift heavier objects, and is unable to wear any bracing devices while working due to the sterile environment. Patient has pain with palpation over the first dorsal compartment. She has pain with AROM of the wrist and resisted wrist and thumb extension. Pt unable to move wrist ulnarly and has a hard end-feel with passive ulnar deviation. Wrist AROM and  strength is decreased on the L compared to R. Trialed k-taping for support at the wrist and thumb which pt reports is helpful. Pt would benefit from OT 2x/week for 4-6 weeks to address pain and weakness. She may benefit from custom thumb spica which we can fabricate next session. Pt understands/agrees with POC.      Prognosis: good    Goals  STG:     Within the next 2-3 visits   Pt will understand and independently complete HEP for AROM, stretching, strengthening.   Pt will implement activity modifications to increase participation in functional tasks  Pt will implement symptom management strategies to tolerate daily routine/tasks    Pt will understand appropriate orthotic management and follow wearing schedule  Pt  will demonstrate independence with edema management techniques     LTG:     Within the next month or at time of discharge  Pt will have increased ROM compared to uninvolved side or to WFL  Pt will have increased  strength in L side within 10% compared to uninvolved side for improved readiness to return to work   Pt will regain independence in completing ADLs and IADLs   Pt will have decreased reports of pain to 0-2/10 increase tolerance and return to heavier household chores  Pt edema will show significant improvement to WFL compared to uninvolved side  Pt will report readiness for discharge from OT.          Plan  Patient would benefit from: skilled occupational therapy  Planned modality interventions: thermotherapy: hydrocollator packs    Planned therapy interventions: IASTM, joint mobilization, kinesiology taping, manual therapy, nerve gliding, orthotic fitting/training, orthotic management and training, patient/caregiver education, strengthening, stretching, therapeutic activities, therapeutic exercise, home exercise program, activity modification, flexibility, functional ROM exercises, graded activity and graded exercise    Frequency: 1-2x week  Duration in weeks: 8  Plan of Care beginning date: 3/24/2025  Plan of Care expiration date: 5/20/2025  Treatment plan discussed with: patient        Subjective Evaluation    History of Present Illness  Mechanism of injury: Pt is a 40 y/o RHD female presenting to OT eval for left DeQuervain's tenosynovitis. Pt reports pain began feeling pain about a year ago. Has received injection twice.     Occupational Profile  ADLs: no difficulty, occasional pain     IADLs: no difficulty, pain     School: n/a    Driving: no difficulty    Sport/Leisure: would like to get back into the gym, pain with pushups     Work: has not worked since 1/27      Quality of life: good    Patient Goals  Patient goals for therapy: increased strength, independence with ADLs/IADLs, return to  sport/leisure activities, return to work, decreased pain and increased motion          Objective    Flowsheet Rows      Flowsheet Row Most Recent Value   PT/OT G-Codes    Current Score 47   Projected Score 62            Tissue Integrity: n/a    Sensation (Ten-Test )  Right Hand (thumb to small finger): 10/10, 10/10, 10/10, 10/10, 10/10  Left Hand (thumb to small finger): 10/10, 10/10, 10/10, 10/10, 10/10    Special Tests: pain with resisted flexion and extension, pain with palpation at first dorsal compartment, pain with resisted thumb extension, pt unable to ulnarly deviate for finklesteins or eichoffs    Edema (circumferential) (cm):    Right Left   Wrist crease 16.4 16.8   Palm 19.8 19.7       AROM       Wrist   Right Left   Extension 66 45       /Pinch Strength  Dynamometer R UE  L UE comments   Position #2 (lbs) 57.6 51.1     Pinch Meter          Lateral 20 16      3 JAW LEONID 14.5 10      2-point 11  7              Precautions: left DeQuervain's tenosynovitis         Manuals  3/24 eval                STM                  Edema              Wound care                                                         Ther Ex                      Thumb AROM                  Wrist AROM/PROM              Tendon glides              Prayer stretch               Isometrics               Gentle strengthening                                                                                                                     Ther Activity                  Activity modification                                                                                                                      Neuro Re-Ed                                                               Ortho Fit                  Thumb spica                                              Modalities                  HP

## 2025-03-28 ENCOUNTER — OFFICE VISIT (OUTPATIENT)
Dept: OCCUPATIONAL THERAPY | Age: 40
End: 2025-03-28
Payer: COMMERCIAL

## 2025-03-28 DIAGNOSIS — M65.4 TENOSYNOVITIS, DE QUERVAIN: Primary | ICD-10-CM

## 2025-03-28 PROCEDURE — 97140 MANUAL THERAPY 1/> REGIONS: CPT

## 2025-03-28 NOTE — PROGRESS NOTES
"Daily Note     Today's date: 3/28/2025  Patient name: Mehdi Nuñez  : 1985  MRN: [id number]  Referring provider: Lee Kwon*  Dx:   Encounter Diagnosis     ICD-10-CM    1. Tenosynovitis, de Quervain  M65.4           Start Time: 952  Stop Time: 1015  Total time in clinic (min): 23 minutes    Subjective: \"I have had more pain since I saw you guys last\"      Objective: See treatment diary below      Assessment: Tolerated treatment well. Pt reports increased pain at first dorsal compartment since initial eval on Monday when the KT tape was applied. Pt reports wearing brace last night and feels less pain today. Pt has pain with wrist ROM and thumb extension which is limiting ROM. Patient would benefit from continued OT      Plan: Continue per plan of care.      Precautions: left DeQuervain's tenosynovitis         Manuals  3/24 eval  3/28              STM    10'              Edema                                                                      Ther Ex                      Thumb AROM    x20              Wrist AROM/PROM  x10            Tendon glides              Prayer stretch               Isometrics               Gentle strengthening                                                                                                                     Ther Activity                  Activity modification                                                                                                                      Neuro Re-Ed                                                               Ortho Fit                  Thumb spica                                              Modalities                  HP    5'                                          "

## 2025-03-30 DIAGNOSIS — E03.9 HYPOTHYROIDISM, UNSPECIFIED TYPE: ICD-10-CM

## 2025-03-30 RX ORDER — LEVOTHYROXINE SODIUM 125 UG/1
125 TABLET ORAL DAILY
Qty: 90 TABLET | Refills: 1 | Status: SHIPPED | OUTPATIENT
Start: 2025-03-30

## 2025-03-31 ENCOUNTER — OFFICE VISIT (OUTPATIENT)
Dept: OCCUPATIONAL THERAPY | Age: 40
End: 2025-03-31
Payer: COMMERCIAL

## 2025-03-31 DIAGNOSIS — M65.4 TENOSYNOVITIS, DE QUERVAIN: Primary | ICD-10-CM

## 2025-03-31 PROCEDURE — 97110 THERAPEUTIC EXERCISES: CPT

## 2025-03-31 NOTE — PROGRESS NOTES
"Daily Note     Today's date: 3/31/2025  Patient name: Mehdi Nuñez  : 1985  MRN: [id number]  Referring provider: Lee Kwon*  Dx:   Encounter Diagnosis     ICD-10-CM    1. Tenosynovitis, de Quervain  M65.4           Start Time: 838  Stop Time: 918  Total time in clinic (min): 40 minutes    Subjective: \"It's a lot better, I only have a little bit of pain now\"      Objective: See treatment diary below      Assessment: Tolerated treatment well. Pt reports having less pain in first dorsal compartment since last session. Pt remains with pain with wrist ROM, mainly with extension. Reviewed HEP and encouraged pt to complete exercises at least twice a day within tolerance. Pt is still limited due to pain. Patient would benefit from continued OT      Plan: Continue per plan of care.      Precautions: left DeQuervain's tenosynovitis         Manuals  3/24 eval  3/28  3/31            STM    10'  10'            Edema                                                                      Ther Ex                      Thumb AROM    x20 x20            Wrist AROM/PROM  x10 x20           Tendon glides              Prayer stretch               Isometrics               Gentle strengthening                                                                                                                     Ther Activity                  Activity modification                                                                                                                      Neuro Re-Ed                                                               Ortho Fit                  Thumb spica                                              Modalities                  HP    5'  5'                                        "

## 2025-04-01 NOTE — PROGRESS NOTES
"Daily Note     Today's date: 2025  Patient name: Mehdi Nuñez  : 1985  MRN: [id number]  Referring provider: Lee Kwon*  Dx:   No diagnosis found.                 Subjective: \"***\"      Objective: See treatment diary below      Assessment: Tolerated treatment well. Pt ***. Patient would benefit from continued OT      Plan: Continue per plan of care.      Precautions: left DeQuervain's tenosynovitis         Manuals  3/24 eval  3/28  3/31  4/2          STM    10'  10'  10'          Edema                                                                      Ther Ex                      Thumb AROM    x20 x20  x20          Wrist AROM/PROM  x10 x20 x20          Tendon glides              Prayer stretch               Isometrics               Gentle strengthening                                                                                                                     Ther Activity                  Activity modification                                                                                                                      Neuro Re-Ed                                                               Ortho Fit                  Thumb spica                                              Modalities                  HP    5'  5'  5'                                      "

## 2025-04-02 ENCOUNTER — APPOINTMENT (OUTPATIENT)
Dept: OCCUPATIONAL THERAPY | Age: 40
End: 2025-04-02
Payer: COMMERCIAL

## 2025-04-07 ENCOUNTER — APPOINTMENT (OUTPATIENT)
Dept: OCCUPATIONAL THERAPY | Age: 40
End: 2025-04-07
Payer: COMMERCIAL

## 2025-04-09 ENCOUNTER — OFFICE VISIT (OUTPATIENT)
Dept: OCCUPATIONAL THERAPY | Age: 40
End: 2025-04-09
Payer: COMMERCIAL

## 2025-04-09 DIAGNOSIS — M65.4 TENOSYNOVITIS, DE QUERVAIN: Primary | ICD-10-CM

## 2025-04-09 PROCEDURE — 97110 THERAPEUTIC EXERCISES: CPT

## 2025-04-09 PROCEDURE — 97140 MANUAL THERAPY 1/> REGIONS: CPT

## 2025-04-09 NOTE — PROGRESS NOTES
"OT - Hold/HEP    Today's date: 2025  Patient name: Mehdi Nuñez  : 1985  MRN: [id number]  Referring provider: Lee Kwon*  Dx:   Encounter Diagnosis     ICD-10-CM    1. Tenosynovitis, de Quervain  M65.4             Start Time: 1048  Stop Time: 1128  Total time in clinic (min): 40 minutes    Subjective: \"I feel 100%. Therapy really helped me.\"      Objective: See treatment diary below      Assessment: Tolerated treatment well. Pt reports feeling no pain at this time. Only notices pain if something touches/bangs her wrist over the 1st DC. She denies difficulty performing daily tasks and feels confident going back to work at this time. Patient would benefit from continued OT in a few weeks pending symptoms with return to work.       Plan: Hold for 3 weeks until pt trials return to work.     Precautions: left DeQuervain's tenosynovitis         Manuals  3/24 eval  3/28  3/31  4/9          STM    10'  10'  10'          Edema                                                                      Ther Ex                      Thumb AROM    x20 x20  x20          Wrist AROM/PROM  x10 x20 x20          Tendon glides              Prayer stretch               Isometrics               Gentle strengthening     2lb wrist curls 3x10    Rubber band extension 3x20                                                                                                                Ther Activity                  Activity modification                                                                                                                      Neuro Re-Ed                                                               Ortho Fit                  Thumb spica                                              Modalities                  HP    5'  5'  5'                                      "

## 2025-04-14 ENCOUNTER — APPOINTMENT (OUTPATIENT)
Dept: OCCUPATIONAL THERAPY | Age: 40
End: 2025-04-14
Payer: COMMERCIAL

## 2025-04-14 ENCOUNTER — APPOINTMENT (OUTPATIENT)
Dept: LAB | Facility: HOSPITAL | Age: 40
End: 2025-04-14
Payer: COMMERCIAL

## 2025-04-14 DIAGNOSIS — R42 LIGHTHEADEDNESS: ICD-10-CM

## 2025-04-14 DIAGNOSIS — E03.8 OTHER SPECIFIED HYPOTHYROIDISM: ICD-10-CM

## 2025-04-14 DIAGNOSIS — Z13.6 SCREENING FOR CARDIOVASCULAR CONDITION: ICD-10-CM

## 2025-04-14 DIAGNOSIS — Z13.1 SCREENING FOR DIABETES MELLITUS: ICD-10-CM

## 2025-04-14 LAB
ALBUMIN SERPL BCG-MCNC: 4.4 G/DL (ref 3.5–5)
ALP SERPL-CCNC: 66 U/L (ref 34–104)
ALT SERPL W P-5'-P-CCNC: 18 U/L (ref 7–52)
ANION GAP SERPL CALCULATED.3IONS-SCNC: 8 MMOL/L (ref 4–13)
AST SERPL W P-5'-P-CCNC: 20 U/L (ref 13–39)
BILIRUB SERPL-MCNC: 0.39 MG/DL (ref 0.2–1)
BUN SERPL-MCNC: 11 MG/DL (ref 5–25)
CALCIUM SERPL-MCNC: 9.1 MG/DL (ref 8.4–10.2)
CHLORIDE SERPL-SCNC: 105 MMOL/L (ref 96–108)
CHOLEST SERPL-MCNC: 162 MG/DL (ref ?–200)
CO2 SERPL-SCNC: 23 MMOL/L (ref 21–32)
CREAT SERPL-MCNC: 0.73 MG/DL (ref 0.6–1.3)
EST. AVERAGE GLUCOSE BLD GHB EST-MCNC: 120 MG/DL
GFR SERPL CREATININE-BSD FRML MDRD: 103 ML/MIN/1.73SQ M
GLUCOSE P FAST SERPL-MCNC: 79 MG/DL (ref 65–99)
HBA1C MFR BLD: 5.8 %
HDLC SERPL-MCNC: 53 MG/DL
LDLC SERPL CALC-MCNC: 93 MG/DL (ref 0–100)
NONHDLC SERPL-MCNC: 109 MG/DL
POTASSIUM SERPL-SCNC: 4.4 MMOL/L (ref 3.5–5.3)
PROT SERPL-MCNC: 7.5 G/DL (ref 6.4–8.4)
SODIUM SERPL-SCNC: 136 MMOL/L (ref 135–147)
TRIGL SERPL-MCNC: 80 MG/DL (ref ?–150)
TSH SERPL DL<=0.05 MIU/L-ACNC: 2.69 UIU/ML (ref 0.45–4.5)

## 2025-04-14 PROCEDURE — 80053 COMPREHEN METABOLIC PANEL: CPT

## 2025-04-14 PROCEDURE — 84443 ASSAY THYROID STIM HORMONE: CPT

## 2025-04-14 PROCEDURE — 36415 COLL VENOUS BLD VENIPUNCTURE: CPT

## 2025-04-14 PROCEDURE — 83036 HEMOGLOBIN GLYCOSYLATED A1C: CPT

## 2025-04-14 PROCEDURE — 80061 LIPID PANEL: CPT

## 2025-04-16 ENCOUNTER — APPOINTMENT (OUTPATIENT)
Dept: OCCUPATIONAL THERAPY | Age: 40
End: 2025-04-16
Payer: COMMERCIAL

## 2025-04-17 ENCOUNTER — OFFICE VISIT (OUTPATIENT)
Dept: FAMILY MEDICINE CLINIC | Facility: CLINIC | Age: 40
End: 2025-04-17
Payer: COMMERCIAL

## 2025-04-17 VITALS
OXYGEN SATURATION: 100 % | HEART RATE: 88 BPM | BODY MASS INDEX: 29.8 KG/M2 | SYSTOLIC BLOOD PRESSURE: 112 MMHG | DIASTOLIC BLOOD PRESSURE: 80 MMHG | HEIGHT: 68 IN | TEMPERATURE: 97.8 F | WEIGHT: 196.6 LBS

## 2025-04-17 DIAGNOSIS — Z00.00 ANNUAL PHYSICAL EXAM: Primary | ICD-10-CM

## 2025-04-17 DIAGNOSIS — E03.8 OTHER SPECIFIED HYPOTHYROIDISM: ICD-10-CM

## 2025-04-17 DIAGNOSIS — R73.03 PREDIABETES: ICD-10-CM

## 2025-04-17 DIAGNOSIS — M65.4 TENOSYNOVITIS, DE QUERVAIN: ICD-10-CM

## 2025-04-17 DIAGNOSIS — F41.9 ANXIETY: ICD-10-CM

## 2025-04-17 PROBLEM — R53.83 OTHER FATIGUE: Status: RESOLVED | Noted: 2023-03-14 | Resolved: 2025-04-17

## 2025-04-17 PROCEDURE — 99396 PREV VISIT EST AGE 40-64: CPT | Performed by: FAMILY MEDICINE

## 2025-04-17 PROCEDURE — 96372 THER/PROPH/DIAG INJ SC/IM: CPT | Performed by: FAMILY MEDICINE

## 2025-04-17 PROCEDURE — 20550 NJX 1 TENDON SHEATH/LIGAMENT: CPT | Performed by: FAMILY MEDICINE

## 2025-04-17 PROCEDURE — 99214 OFFICE O/P EST MOD 30 MIN: CPT | Performed by: FAMILY MEDICINE

## 2025-04-17 RX ADMIN — LIDOCAINE HYDROCHLORIDE 0.5 ML: 10 INJECTION, SOLUTION INFILTRATION; PERINEURAL at 16:00

## 2025-04-17 RX ADMIN — TRIAMCINOLONE ACETONIDE 20 MG: 40 INJECTION, SUSPENSION INTRA-ARTICULAR; INTRAMUSCULAR at 16:00

## 2025-04-17 NOTE — ASSESSMENT & PLAN NOTE
Lab Results   Component Value Date    [id number] 2.695 04/14/2025    TSH 6.89 (H) 11/09/2021     Stable  Continue levothyroxine 125mcg    Orders:  •  TSH, 3rd generation; Future

## 2025-04-17 NOTE — ASSESSMENT & PLAN NOTE
Much improved with physical therapy  Continues to use left wrist brace  She is 50% better after pt  Is agreeable for repeat csi today and tolerated procedure well  Patient may return to work with restrictions of repetitive lifting  Orders:  •  Hand/upper extremity injection: L extensor compartment 1  •  lidocaine (XYLOCAINE) 1 % injection 0.5 mL  •  triamcinolone acetonide (Kenalog-40) 40 mg/mL injection 20 mg

## 2025-04-17 NOTE — PROGRESS NOTES
Adult Annual Physical  Name: Mehdi Nuñez      : 1985      MRN: [id number]  Encounter Provider: Lee Kwon DO  Encounter Date: 2025   Encounter department: [facility]    :  Assessment & Plan  Annual physical exam         Other specified hypothyroidism  Lab Results   Component Value Date    [id number] 2.695 2025    TSH 6.89 (H) 2021     Stable  Continue levothyroxine 125mcg    Orders:  •  TSH, 3rd generation; Future    Tenosynovitis, de Quervain  Much improved with physical therapy  Continues to use left wrist brace  She is 50% better after pt  Is agreeable for repeat csi today and tolerated procedure well  Patient may return to work with restrictions of repetitive lifting  Orders:  •  Hand/upper extremity injection: L extensor compartment 1  •  lidocaine (XYLOCAINE) 1 % injection 0.5 mL  •  triamcinolone acetonide (Kenalog-40) 40 mg/mL injection 20 mg    Anxiety  Well controlled since restarting treatment for hypothyroid         Prediabetes    Orders:  •  Hemoglobin A1C; Future  •  Comprehensive metabolic panel; Future        Preventive Screenings:  - Diabetes Screening: screening up-to-date  - Cholesterol Screening: screening up-to-date   - Hepatitis C screening: screening up-to-date   - HIV screening: screening up-to-date   - Cervical cancer screening: screening up-to-date   - Breast cancer screening: pateint declines   - Colon cancer screening: screening not indicated   - Lung cancer screening: screening not indicated     Immunizations:  - Immunizations due: Influenza         History of Present Illness     Adult Annual Physical:  Patient presents for annual physical.     Diet and Physical Activity:  - Diet/Nutrition: well balanced diet and prolonged fasting.  - Exercise: less than 30 minutes on average.    General Health:  - Sleep: 4-6 hours of sleep on average and sleeps poorly.  - Hearing: normal hearing bilateral ears.  -  Vision: wears glasses.  - Dental: brushes teeth three times daily and floss regularly.    /GYN Health:  - Follows with GYN: yes.   - Last menstrual cycle: 4/4/2025.   - History of STDs: no  - Contraception: barrier methods.      Advanced Care Planning:  - Has an advanced directive?: no    - Has a durable medical POA?: no      Review of Systems   Constitutional:  Negative for activity change, chills, diaphoresis and fever.   HENT:  Negative for ear pain, hearing loss, postnasal drip, rhinorrhea, sinus pressure, sinus pain, sneezing and sore throat.    Respiratory:  Negative for cough, chest tightness, shortness of breath and wheezing.    Cardiovascular:  Negative for chest pain, palpitations and leg swelling.   Gastrointestinal:  Negative for abdominal pain, blood in stool, constipation, diarrhea, nausea and vomiting.   Genitourinary:  Negative for dysuria, frequency, hematuria and urgency.   Musculoskeletal:  Negative for arthralgias and myalgias.   Neurological:  Negative for dizziness, syncope, weakness, light-headedness, numbness and headaches.   Psychiatric/Behavioral:  Negative for decreased concentration and dysphoric mood. The patient is not nervous/anxious.      Current Outpatient Medications on File Prior to Visit   Medication Sig Dispense Refill   • levothyroxine 125 mcg tablet TAKE 1 TABLET BY MOUTH EVERY DAY 90 tablet 1   • tirzepatide (Zepbound) 5 mg/0.5 mL auto-injector Inject 0.5 mL (5 mg total) under the skin once a week 2 mL 3     No current facility-administered medications on file prior to visit.      Social History     Tobacco Use   • Smoking status: Never     Passive exposure: Never   • Smokeless tobacco: Never   Vaping Use   • Vaping status: Never Used   Substance and Sexual Activity   • Alcohol use: Never     Alcohol/week: 2.0 standard drinks of alcohol     Types: 2 Glasses of wine per week   • Drug use: Never   • Sexual activity: Yes     Partners: Male     Birth control/protection: None  "      Objective   /80 (BP Location: Left arm, Patient Position: Sitting, Cuff Size: Adult)   Pulse 88   Temp 97.8 °F (36.6 °C) (Temporal)   Ht 5' 8\" (1.727 m)   Wt 89.2 kg (196 lb 9.6 oz)   LMP 04/04/2025   SpO2 100%   BMI 29.89 kg/m²     Physical Exam  Constitutional:       General: She is not in acute distress.     Appearance: Normal appearance. She is well-developed. She is not diaphoretic.   HENT:      Head: Normocephalic and atraumatic.      Right Ear: Tympanic membrane, ear canal and external ear normal. There is no impacted cerumen.      Left Ear: Tympanic membrane, ear canal and external ear normal. There is no impacted cerumen.      Nose: Nose normal. No congestion or rhinorrhea.      Mouth/Throat:      Mouth: Mucous membranes are moist.      Pharynx: Oropharynx is clear. No oropharyngeal exudate.   Eyes:      Conjunctiva/sclera: Conjunctivae normal.      Pupils: Pupils are equal, round, and reactive to light.   Neck:      Vascular: No JVD.   Cardiovascular:      Rate and Rhythm: Normal rate and regular rhythm.      Heart sounds: Normal heart sounds. No murmur heard.     No friction rub. No gallop.   Pulmonary:      Effort: Pulmonary effort is normal. No respiratory distress.      Breath sounds: Normal breath sounds. No wheezing or rales.   Chest:      Chest wall: No tenderness.   Abdominal:      General: Bowel sounds are normal. There is no distension.      Palpations: Abdomen is soft.      Tenderness: There is no abdominal tenderness. There is no right CVA tenderness, left CVA tenderness, guarding or rebound.   Musculoskeletal:         General: No tenderness. Normal range of motion.      Cervical back: No tenderness.      Comments: Positive finkelstein on left   Lymphadenopathy:      Cervical: No cervical adenopathy.   Skin:     General: Skin is warm and dry.   Neurological:      Mental Status: She is alert and oriented to person, place, and time.      Cranial Nerves: No cranial nerve " deficit.   Psychiatric:         Mood and Affect: Mood and affect normal.         Behavior: Behavior normal.     Hand/upper extremity injection: L extensor compartment 1  Universal Protocol:  procedure performed by consultantConsent: Verbal consent obtained.  Risks and benefits: risks, benefits and alternatives were discussed  Consent given by: patient  Patient identity confirmed: verbally with patient  Supporting Documentation  Indications: tendon swelling   Procedure Details  Condition:de Quervain's tenosynovitis Site: L extensor compartment 1   Preparation: Patient was prepped and draped in the usual sterile fashion  Needle size: 27 G  Ultrasound guidance: no  Approach: radial  Medications administered: 0.5 mL lidocaine 1 %; 20 mg triamcinolone acetonide 40 mg/mL  Patient tolerance: patient tolerated the procedure well with no immediate complications  Dressing:  Sterile dressing applied

## 2025-04-18 RX ORDER — LIDOCAINE HYDROCHLORIDE 10 MG/ML
0.5 INJECTION, SOLUTION INFILTRATION; PERINEURAL
Status: COMPLETED | OUTPATIENT
Start: 2025-04-17 | End: 2025-04-17

## 2025-04-18 RX ORDER — TRIAMCINOLONE ACETONIDE 40 MG/ML
20 INJECTION, SUSPENSION INTRA-ARTICULAR; INTRAMUSCULAR
Status: COMPLETED | OUTPATIENT
Start: 2025-04-17 | End: 2025-04-17

## 2025-04-21 ENCOUNTER — TELEPHONE (OUTPATIENT)
Age: 40
End: 2025-04-21

## 2025-04-21 NOTE — LETTER
April 29, 2025     Patient: Mehdi Nuñez  YOB: 1985  Date of Visit: 4/21/2025      To Whom it May Concern:    Mehdi Nuñez is under my professional care. She may return to work with restrictions as follows:   no lifting greater than 35lbs  no pushing weight greater than 300lbs  will need to attend occupational therapy 1x per month.    If you have any questions or concerns, please don't hesitate to call.         Sincerely,          Laurie Benson RN        CC: No Recipients

## 2025-04-21 NOTE — TELEPHONE ENCOUNTER
Patient would like a note from her PCP sent to [facility] [employer] including a return to work date and specific instructions of what she can/can't do. Please fax to [phone].

## 2025-04-23 NOTE — TELEPHONE ENCOUNTER
Patient called again to check on status of work note.  Please notify patient once this is completed.

## 2025-04-24 NOTE — TELEPHONE ENCOUNTER
Patient calls to ask for a new letter to be completed in order for her to return to work. Patient states that the weight limit for lifting needs to be 35 lbs as this is what she discussed with the provider. The letter must also include that the patient is allowed to push a weight up to 300 lbs. The letter must also include that she is to continue OT 1 x per month. Please fax the revised letter to [phone] Attn: Manan Check. Please notify the patient when this has been completed. Patient can be reached at [phone]

## 2025-04-28 DIAGNOSIS — E66.811 OBESITY, CLASS I, BMI 30-34.9: ICD-10-CM

## 2025-04-29 RX ORDER — TIRZEPATIDE 5 MG/.5ML
5 INJECTION, SOLUTION SUBCUTANEOUS WEEKLY
Qty: 2 ML | Refills: 0 | Status: SHIPPED | OUTPATIENT
Start: 2025-04-29

## 2025-04-29 NOTE — TELEPHONE ENCOUNTER
Patient calls again regarding a note to return to the work. Please see previous note date 4/24/25 for the specifics regarding the work note. Fax number and recipient are also included in the note dated 4/24/25. Patient can be reached at [phone]

## 2025-05-27 ENCOUNTER — NURSE TRIAGE (OUTPATIENT)
Age: 40
End: 2025-05-27

## 2025-05-27 ENCOUNTER — TELEPHONE (OUTPATIENT)
Dept: FAMILY MEDICINE CLINIC | Facility: CLINIC | Age: 40
End: 2025-05-27

## 2025-05-27 NOTE — TELEPHONE ENCOUNTER
"REASON FOR CONVERSATION: 2cm spot on her wrist where she got an injection in April.     SYMPTOMS: White spot with a black ring around it. No itching, no redness, no pain. States it looks like a burn. She is going to send a pic via [employer].     PROTOCOL DISPOSITION: Patient would like a call from the provider.     PRACTICE FOLLOW-UP: Patient would like a follow up call. She is concerned about the spot.       Reason for Disposition   Mild localized rash    Answer Assessment - Initial Assessment Questions  1. APPEARANCE of RASH: \"Describe the rash.\"       Approx 2cm spot on the top of her left wrist, black ring with a white center.   2. LOCATION: \"Where is the rash located?\"       Wrist where she got an injection in April  3. NUMBER: \"How many spots are there?\"       1  4. SIZE: \"How big are the spots?\" (Inches, centimeters or compare to size of a coin)       2cm  5. ONSET: \"When did the rash start?\"       Noticed it yesterday  6. ITCHING: \"Does the rash itch?\" If Yes, ask: \"How bad is the itch?\"  (Scale 0-10; or none, mild, moderate, severe)      no  7. PAIN: \"Does the rash hurt?\" If Yes, ask: \"How bad is the pain?\"  (Scale 0-10; or none, mild, moderate, severe)      no  8. OTHER SYMPTOMS: \"Do you have any other symptoms?\" (e.g., fever)      no    Protocols used: Rash or Redness - Localized-Adult-[place]    "

## 2025-05-28 DIAGNOSIS — E66.811 OBESITY, CLASS I, BMI 30-34.9: ICD-10-CM

## 2025-05-28 RX ORDER — TIRZEPATIDE 5 MG/.5ML
5 INJECTION, SOLUTION SUBCUTANEOUS WEEKLY
Qty: 6 ML | Refills: 0 | Status: SHIPPED | OUTPATIENT
Start: 2025-05-28

## 2025-06-16 ENCOUNTER — OFFICE VISIT (OUTPATIENT)
Dept: FAMILY MEDICINE CLINIC | Facility: CLINIC | Age: 40
End: 2025-06-16
Payer: COMMERCIAL

## 2025-06-16 VITALS
DIASTOLIC BLOOD PRESSURE: 74 MMHG | OXYGEN SATURATION: 98 % | SYSTOLIC BLOOD PRESSURE: 110 MMHG | BODY MASS INDEX: 30.37 KG/M2 | HEIGHT: 68 IN | HEART RATE: 77 BPM | WEIGHT: 200.4 LBS | TEMPERATURE: 97.9 F

## 2025-06-16 DIAGNOSIS — B36.0 TINEA VERSICOLOR: Primary | ICD-10-CM

## 2025-06-16 PROCEDURE — 99213 OFFICE O/P EST LOW 20 MIN: CPT | Performed by: FAMILY MEDICINE

## 2025-06-16 RX ORDER — KETOCONAZOLE 20 MG/G
CREAM TOPICAL DAILY
Qty: 60 G | Refills: 0 | Status: SHIPPED | OUTPATIENT
Start: 2025-06-16

## 2025-06-16 NOTE — PROGRESS NOTES
"Name: Mehdi Nuñez      : 1985      MRN: [id number]  Encounter Provider: Lee Kwon DO  Encounter Date: 2025   Encounter department: [facility]  :  Assessment & Plan  Tinea versicolor  Ongoing for several weeks  Initially puritic but no longer  May also be 2/2 injection site reaction from CSI  Start topical anti-fungal cream  Follow up prn    Orders:  •  ketoconazole (NIZORAL) 2 % cream; Apply topically daily           History of Present Illness   HPI presents today for hyopigmented rash on left wrist. Ongoing since csi.  Review of Systems   Constitutional:  Negative for activity change, chills, diaphoresis and fever.   HENT:  Negative for ear pain, hearing loss, postnasal drip, rhinorrhea, sinus pressure, sinus pain, sneezing and sore throat.    Respiratory:  Negative for cough, chest tightness, shortness of breath and wheezing.    Cardiovascular:  Negative for chest pain, palpitations and leg swelling.   Gastrointestinal:  Negative for abdominal pain, blood in stool, constipation, diarrhea, nausea and vomiting.   Genitourinary:  Negative for dysuria, frequency, hematuria and urgency.   Musculoskeletal:  Negative for arthralgias and myalgias.   Skin:  Positive for rash.   Neurological:  Negative for dizziness, syncope, weakness, light-headedness, numbness and headaches.       Objective   /74 (BP Location: Left arm, Patient Position: Sitting, Cuff Size: Large)   Pulse 77   Temp 97.9 °F (36.6 °C) (Temporal)   Ht 5' 8\" (1.727 m)   Wt 90.9 kg (200 lb 6.4 oz)   SpO2 98%   BMI 30.47 kg/m²      Physical Exam  Vitals reviewed.   Constitutional:       General: She is not in acute distress.     Appearance: She is not ill-appearing, toxic-appearing or diaphoretic.   HENT:      Head: Normocephalic and atraumatic.      Right Ear: External ear normal.      Left Ear: External ear normal.      Nose: Nose normal. No congestion or rhinorrhea.      " Mouth/Throat:      Mouth: Mucous membranes are moist.      Pharynx: Oropharynx is clear.     Eyes:      General: No scleral icterus.        Right eye: No discharge.         Left eye: No discharge.      Conjunctiva/sclera: Conjunctivae normal.     Pulmonary:      Effort: No respiratory distress.     Musculoskeletal:        Hands:      Skin:     Coloration: Skin is not pale.      Findings: No erythema.     Neurological:      Mental Status: She is alert.     Psychiatric:         Mood and Affect: Mood normal.         Thought Content: Thought content normal.

## 2025-06-23 DIAGNOSIS — E66.811 OBESITY, CLASS I, BMI 30-34.9: ICD-10-CM

## 2025-06-23 RX ORDER — TIRZEPATIDE 5 MG/.5ML
5 INJECTION, SOLUTION SUBCUTANEOUS WEEKLY
Qty: 2 ML | Refills: 0 | Status: SHIPPED | OUTPATIENT
Start: 2025-06-23

## 2025-08-19 DIAGNOSIS — E03.9 HYPOTHYROIDISM, UNSPECIFIED TYPE: ICD-10-CM

## 2025-08-21 RX ORDER — LEVOTHYROXINE SODIUM 125 UG/1
125 TABLET ORAL DAILY
Qty: 90 TABLET | Refills: 1 | Status: SHIPPED | OUTPATIENT
Start: 2025-08-21